# Patient Record
Sex: MALE | Race: WHITE | NOT HISPANIC OR LATINO | URBAN - METROPOLITAN AREA
[De-identification: names, ages, dates, MRNs, and addresses within clinical notes are randomized per-mention and may not be internally consistent; named-entity substitution may affect disease eponyms.]

---

## 2018-01-23 ENCOUNTER — EMERGENCY (EMERGENCY)
Facility: HOSPITAL | Age: 23
LOS: 1 days | Discharge: HOME | End: 2018-01-23
Admitting: EMERGENCY MEDICINE

## 2018-01-23 ENCOUNTER — INPATIENT (INPATIENT)
Facility: HOSPITAL | Age: 23
LOS: 0 days | Discharge: HOME | End: 2018-01-24
Attending: PSYCHIATRY & NEUROLOGY | Admitting: PSYCHIATRY & NEUROLOGY
Payer: COMMERCIAL

## 2018-01-23 VITALS
TEMPERATURE: 98 F | RESPIRATION RATE: 18 BRPM | HEART RATE: 116 BPM | DIASTOLIC BLOOD PRESSURE: 98 MMHG | SYSTOLIC BLOOD PRESSURE: 154 MMHG | OXYGEN SATURATION: 100 %

## 2018-01-23 LAB
AMPHET UR-MCNC: NEGATIVE — SIGNIFICANT CHANGE UP
BARBITURATES UR SCN-MCNC: NEGATIVE — SIGNIFICANT CHANGE UP
BASOPHILS # BLD AUTO: 0.01 K/UL — SIGNIFICANT CHANGE UP (ref 0–0.2)
BASOPHILS NFR BLD AUTO: 0.1 % — SIGNIFICANT CHANGE UP (ref 0–2)
BENZODIAZ UR-MCNC: NEGATIVE — SIGNIFICANT CHANGE UP
CANNABINOIDS UR-MCNC: NEGATIVE — SIGNIFICANT CHANGE UP
COCAINE METAB.OTHER UR-MCNC: NEGATIVE — SIGNIFICANT CHANGE UP
EOSINOPHIL # BLD AUTO: 0.07 K/UL — SIGNIFICANT CHANGE UP (ref 0–0.5)
EOSINOPHIL NFR BLD AUTO: 1 % — SIGNIFICANT CHANGE UP (ref 0–6)
HCT VFR BLD CALC: 45.6 % — SIGNIFICANT CHANGE UP (ref 39–50)
HGB BLD-MCNC: 14.7 G/DL — SIGNIFICANT CHANGE UP (ref 13–17)
IMM GRANULOCYTES # BLD AUTO: 0.01 # — SIGNIFICANT CHANGE UP
IMM GRANULOCYTES NFR BLD AUTO: 0.1 % — SIGNIFICANT CHANGE UP (ref 0–1.5)
LYMPHOCYTES # BLD AUTO: 2.07 K/UL — SIGNIFICANT CHANGE UP (ref 1–3.3)
LYMPHOCYTES # BLD AUTO: 29 % — SIGNIFICANT CHANGE UP (ref 13–44)
MCHC RBC-ENTMCNC: 27.4 PG — SIGNIFICANT CHANGE UP (ref 27–34)
MCHC RBC-ENTMCNC: 32.2 % — SIGNIFICANT CHANGE UP (ref 32–36)
MCV RBC AUTO: 84.9 FL — SIGNIFICANT CHANGE UP (ref 80–100)
METHADONE UR-MCNC: NEGATIVE — SIGNIFICANT CHANGE UP
MONOCYTES # BLD AUTO: 0.43 K/UL — SIGNIFICANT CHANGE UP (ref 0–0.9)
MONOCYTES NFR BLD AUTO: 6 % — SIGNIFICANT CHANGE UP (ref 2–14)
NEUTROPHILS # BLD AUTO: 4.54 K/UL — SIGNIFICANT CHANGE UP (ref 1.8–7.4)
NEUTROPHILS NFR BLD AUTO: 63.8 % — SIGNIFICANT CHANGE UP (ref 43–77)
NRBC # FLD: 0 — SIGNIFICANT CHANGE UP
OPIATES UR-MCNC: NEGATIVE — SIGNIFICANT CHANGE UP
OXYCODONE UR-MCNC: NEGATIVE — SIGNIFICANT CHANGE UP
PCP UR-MCNC: NEGATIVE — SIGNIFICANT CHANGE UP
PLATELET # BLD AUTO: 179 K/UL — SIGNIFICANT CHANGE UP (ref 150–400)
PMV BLD: 11.3 FL — SIGNIFICANT CHANGE UP (ref 7–13)
RBC # BLD: 5.37 M/UL — SIGNIFICANT CHANGE UP (ref 4.2–5.8)
RBC # FLD: 12.2 % — SIGNIFICANT CHANGE UP (ref 10.3–14.5)
WBC # BLD: 7.13 K/UL — SIGNIFICANT CHANGE UP (ref 3.8–10.5)
WBC # FLD AUTO: 7.13 K/UL — SIGNIFICANT CHANGE UP (ref 3.8–10.5)

## 2018-01-23 PROCEDURE — 70450 CT HEAD/BRAIN W/O DYE: CPT | Mod: 26

## 2018-01-23 PROCEDURE — 90792 PSYCH DIAG EVAL W/MED SRVCS: CPT

## 2018-01-23 NOTE — ED BEHAVIORAL HEALTH ASSESSMENT NOTE - CASE SUMMARY
22 year old male, domiciled, student at [employer], non-caregiver, with no formal PPH, no prior hospitalizations, no current outpatient treatment, no hx of self harm behaviors, no prior suicide attempts, no hx of manic or psychotic s/s, no hx of violence or arrests, no known trauma, hx of alcohol and marijuana use but none recently, with no relevant past medical history, brought in by EMS, from home but referred by school, presenting with bizarre behavior.    Pt with s/s of psychosis including Jain preoccupation and delusions that he knows the will of Gd (which encourages him to do bizarre but not dangerous things). Discussed benefits of hospitalization with patient and his parents and both highly object to admission. Pt's psychosis does not place him as being an acute risk of harm to himself or others - he is not suicidal, aggressive or homicidal; his delusions are positive in nature; he is not receiving commands to hurt himself or others; he is sleeping/eating and otherwise caring for his basic needs. Pt does not meet criteria for involuntary admission as a result of this. Pt and parents provided with extensive psychoeducation and safety planning. Pt will remain under the supervision of his mother while they pursue outpatient treatment at this time. Provided pt and parents with information for [facility] crisis clinic (should they not be able to get an appointment at [facility] as mom suspects she will) as well as the [facility] ETP track. Everyone involved felt they had adequate outpatient resources and were comfortable with patient returning home at this time. Family and patient advised to return to ED or call 911 for any worsening symptoms or safety concerns.  Pt display mild psychotic sx that where not currently dangerous to self or others. He was offered 9.13 admission which he refused and under the language of 9.39 and 9.27 he won't meet criteria for admission  under 9.39 and 9.27 admission. Pt parent reports that they felt ok with him coming home with them and would take responsibility for him. Pt mother states that she is a psych nurse and she will make sure the pt get psych f/u tomorrow at [employer] where she works.

## 2018-01-23 NOTE — ED BEHAVIORAL HEALTH ASSESSMENT NOTE - DIFFERENTIAL
r/o Schizophreniform vs Schizophrenia vs Schizoaffective d/o vs Bipolar d/o with psychosis. r/o Brief psychotic disorder vs Schizophreniform vs Schizophrenia vs Schizoaffective d/o vs Bipolar d/o with psychosis.

## 2018-01-23 NOTE — ED BEHAVIORAL HEALTH ASSESSMENT NOTE - RISK ASSESSMENT
Risk factors: Single, male gender, first break psychosis, hx of substance use, academic decline, absence of outpatient follow-up, limited insight into symptoms, difficulty expressing thoughts. Risk factors: Single, male gender, first break psychosis, hx of substance use, academic decline, absence of outpatient follow-up, limited insight into symptoms, difficulty expressing thoughts.    Protective factors: Young, healthy, denies any active suicidal ideation/intent/plan, no hx of prior attempts, no self-harm behaviors, no hospitalizations, no family hx, has responsibility to family and others, identifies reasons for living, future oriented, supportive social network or family, high spirituality, engaged in school, no active substance use, no access to firearms, no legal issues, no hx of abuse and adequate outpatient follow up with motivation to participate in care.     Based on risk assessment evaluation, Pt does not appear to be at imminent risk of harm to self or others at this time.

## 2018-01-23 NOTE — ED ADULT NURSE NOTE - CHIEF COMPLAINT QUOTE
Pt is student at [employer]. School called EMS because patient was acting strange and irrational. Called for psych eval. No previous psych hx. Denies SI, HI, drug or alcohol use. Calm and cooperative in triage.

## 2018-01-23 NOTE — ED BEHAVIORAL HEALTH ASSESSMENT NOTE - DESCRIPTION (FIRST USE, LAST USE, QUANTITY, FREQUENCY, DURATION)
Hx of social use, last about 1 year ago. Hx of social use, last about 1 year ago Hx of taking a few illicit Xanax, last over 1-2 years ago

## 2018-01-23 NOTE — ED BEHAVIORAL HEALTH ASSESSMENT NOTE - SUMMARY
Patient is a 22 year old male, domiciled, student at [employer], non-caregiver, with no formal PPH, no prior hospitalizations, no current outpatient treatment, no hx of self harm behaviors, no prior suicide attempts, no hx of manic or psychotic s/s, no hx of violence or arrests, no known trauma, hx of alcohol and marijuana use but none recently, with no relevant past medical history, brought in by EMS, from home but referred by school, presenting with bizarre behavior.    Pt acutely psychotic, religiously preoccupied, speaking largely in abstract/philosophical terms, acting bizarre at school, believing he knows the will of Gd which compelled him to act irrationally. Pt is not functioning at his baseline and requires hospitalization for safety and stabilization. Patient is a 22 year old male, domiciled, student at [employer], non-caregiver, with no formal PPH, no prior hospitalizations, no current outpatient treatment, no hx of self harm behaviors, no prior suicide attempts, no hx of manic or psychotic s/s, no hx of violence or arrests, no known trauma, hx of alcohol and marijuana use but none recently, with no relevant past medical history, brought in by EMS, from home but referred by school, presenting with bizarre behavior.    Pt with s/s of psychosis including Zoroastrian preoccupation and delusions that he knows the will of Gd (which encourages him to do bizarre but not dangerous things). Discussed benefits of hospitalization with patient and his parents and both highly object to admission. Pt's psychosis does not place him as being an acute risk of harm to himself or others - he is not suicidal, aggressive or homicidal; his delusions are positive in nature; he is not receiving commands to hurt himself or others; he is sleeping/eating and otherwise caring for his basic needs. Pt does not meet criteria for involuntary admission as a result of this. Pt and parents provided with extensive psychoeducation and safety planning. Pt will remain under the supervision of his mother while they pursue outpatient treatment at this time. Provided pt and parents with information for [facility] crisis clinic (should they not be able to get an appointment at [facility] as mom suspects she will) as well as the [facility] ETP track. Everyone involved felt they had adequate outpatient resources and were comfortable with patient returning home at this time. Family and patient advised to return to ED or call 911 for any worsening symptoms or safety concerns.

## 2018-01-23 NOTE — ED BEHAVIORAL HEALTH ASSESSMENT NOTE - SUICIDE PROTECTIVE FACTORS
Identifies reasons for living/Supportive social network or family/Future oriented/High spirituality/Engaged in work or school

## 2018-01-23 NOTE — ED PROVIDER NOTE - MEDICAL DECISION MAKING DETAILS
This is a 22 year old male No Pmhx BIBA from [place] for psych eval r/t bizarre behavior. Per EMS patient is irrational , aggressive, acting strange and hyperreligious. 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 Patient is smiling  states "I believe in God" Medical evaluation performed. There is no clinical evidence of intoxication or any acute medical problem requiring immediate intervention. Final disposition will be determined by psychiatrist.

## 2018-01-23 NOTE — ED BEHAVIORAL HEALTH ASSESSMENT NOTE - HPI (INCLUDE ILLNESS QUALITY, SEVERITY, DURATION, TIMING, CONTEXT, MODIFYING FACTORS, ASSOCIATED SIGNS AND SYMPTOMS)
Patient is a 22 year old male, domiciled, student at [employer], non-caregiver, with no formal PPH, no prior hospitalizations, no current outpatient treatment, no hx of self harm behaviors, no prior suicide attempts, no hx of manic or psychotic s/s, no hx of violence or arrests, no known trauma, hx of alcohol and marijuana use but none recently, with no relevant past medical history, brought in by EMS, from home but referred by school, presenting with bizarre behavior.    Patient has not had any past psychiatric visits, hospitalizations, medication trials or visits with a therapist or a counselor. No hx of suicidality, suicidal attempts or self- injurious behavior in the past. No hx of preoccupations with violence or arrests.     Pt highly philosophical and abstract on interview, when asked for reason of visit, pt asked writer "what do you believe" and then laughed to himself stating "Oh was I?". When asked to clarify pt states that "communicating is the hardest thing and you need omar to communicate with Pio". Pt states he has been communicating with Gd and Pio for the past 1-2 years after he was going through a hard time and reached out to Gd one night. He states "he came to me in a unique and marvelous way". Pt denies hearing the actual voice of Gd or Pio but states he knows in his heart what Gd's will is because he has omar. When really encouraged to state the circumstances leading up to his ER visit, pt states "it was the glory of Gd". He states that Gd taught him how to use this glory and make other people feel good. He states he was in class today and "we made some good glory in a huge classroom". Pt then laughing inappropriately, stating "the professor was worried but were business partners". He states at the end of class he knew it was Gd's will to remain in the classroom until everyone left. Pt states he did that and was in the classroom alone, feeling "amazing" because he did what he was supposed to do. Pt states he was walking around and was "putting my jane on the chalk board" in the form of a flag. Pt states the TAs walked back into the classroom and they engaged in a conversation about Gd. Pt was asked to leave the classroom and when he did was met by his teacher. The teacher asked pt his name, which he believes was Gd actually asking for the pt's name, "so all this would happen".    On ROS, pt denies any depressive symptoms, states his mood is "amazing", denies any suicidal ideations, intent or plans. Pt denies racing thoughts, increased energy, decreased need for sleep, reckless behaviors like increased spending/sex/travel. Pt states his thoughts are "mellow". Pt denies distinct AH but states "if I listen hard enough I can hear what Gd wants". He denies specific IOR from TV or radio, but states he does get messages from Gd. Denies aggressive or homicidal ideations, intent or plans. Denies trauma/abuse. Pt reports a hx of alcohol and marijuana use during his Freshman year at [employer] but states he has not used either of those in the past year. Pt also reports taking a "few Xanax" (illicit) a few years ago but denies anything recently. No other substance use/abuse.     Collateral from parents - see  note. The parents do not feel that pt has a psychiatric condition at this time. Patient is a 22 year old male, domiciled, student at [employer], non-caregiver, with no formal PPH, no prior hospitalizations, no current outpatient treatment, no hx of self harm behaviors, no prior suicide attempts, no hx of manic or psychotic s/s, no hx of violence or arrests, no known trauma, hx of alcohol and marijuana use but none recently, with no relevant past medical history, brought in by EMS, from home but referred by school, presenting with bizarre behavior.    Patient has not had any past psychiatric visits, hospitalizations, medication trials or visits with a therapist or a counselor. No hx of suicidality, suicidal attempts or self- injurious behavior in the past. No hx of preoccupations with violence or arrests.     Pt highly philosophical and abstract on interview, when asked for reason of visit, pt asked writer "what do you believe?" and then laughed to himself stating "Oh was I?". When asked to clarify pt states that "communicating is the hardest thing and you need omar to communicate with Pio". Pt states he has been communicating with Gd and Pio for the past 1-2 years after he was going through a hard time and reached out to Gd one night. He states "he came to me in a unique and marvelous way". Pt denies hearing the actual voice of Gd or Pio but states he knows in his heart what Gd's will is because he has omar. When really encouraged to state the circumstances leading up to his ER visit, pt states "it was the glory of Gd". He states that Gd taught him how to use this glory and make other people feel good. He states he was in class today and "we made some good glory in a huge classroom". Pt then laughing inappropriately, stating "the professor was worried but we are business partners". He states at the end of class he knew it was Gd's will to remain in the classroom until everyone left. Pt states he did that and was in the classroom alone, feeling "amazing" because he did what he was supposed to do. Pt states he was walking around and was "putting my jane on the chalk board" in the form of a cross. Pt states the TAs walked back into the classroom and they engaged in a conversation about Gd. Pt was asked to leave the classroom and when he did was met by his teacher. The teacher asked pt his name, which he believes was Gd actually asking for the pt's name, "so all this would happen".    On ROS, pt denies any depressive symptoms, states his mood is "amazing", denies any suicidal ideations, intent or plans. Pt denies racing thoughts, increased energy, decreased need for sleep, reckless behaviors like increased spending/sex/travel. Pt states his thoughts are "mellow". Pt denies distinct AH but states "if I listen hard enough I can hear what Gd wants". He denies specific IOR from TV or radio, but states he does get messages from Gd. Denies aggressive or homicidal ideations, intent or plans. Denies trauma/abuse. Pt reports a hx of alcohol and marijuana use during his Freshman year at [employer] but states he has not used either of those in the past year. Pt also reports taking a "few Xanax" (illicit) a few years ago but denies anything recently. No other substance use/abuse.     Collateral from parents - see  note. After evaluation, writer met again with pt's parents. They feel strongly that pt should not be hospitalized at this time, however state they will have him see a private outpatient psychiatrist tomorrow. Pt's mother is a psychiatric nurse and has connections at [facility]. She states she will be able to get pt in to see a psychiatrist tomorrow without issue. Mom also states that she will be staying with the patient 24/7 until it is determined that he is safe to be on his own again. Initially, both parents seemed resistant to the idea that pt had a psychiatric illness, however with psychoeducation they now appear to have a better understanding of the circumstances and are appropriately concerned/willing to seek help for the patient. Mom and dad also state they will bring patient back to the ER should symptoms worsen or safety concerns arise.

## 2018-01-23 NOTE — ED BEHAVIORAL HEALTH NOTE - BEHAVIORAL HEALTH NOTE
This worker has met with the patient's mother Elizabeth Joyce [phone] and father Saúl Joyce [phone] for collateral. 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 The patient he does not have history of mental health treatment. He is not known to have hallucinations or delusions. He does not have history of violence. The patient is noted to have always been Denominational, but is described as having become more devout over the years since turning 16 years old. The patient is known to frequently pray, read the bible and to cite his personal relationship to god.    He is not noted to have had any unusual behavior and just returned to class today after winter break. He spent his break with his family and is said to have enjoyed a trip to [place]. Today was the patient's 1st day of class and he has just began a "heavy semester" with 16 credits consisting of a long class day on both Tuesday and Thursday.  The patient returned home from class this evening around 745 and appeared "fine." The family was unaware of any issues or why the following events occurred; Shortly after patient's arrival home [place] police called the family home to speak with the patient. He reported being "ok" to [employer] staff. A few minutes after the call police arrived at the family home and the patient began to speak with them about being "good guys" and told them of his relationship with god and that "Pio speaks to his heart" so they should go and live the glory of god. Because of this behavior the he was brought to [facility] for a  mental health evaluation.    Patient's family is unaware of the events which caused his [facility] campus to call 911. They do not feel that the patient has a behavioral problem and request discharge. They report that they can arrange an appointment with a family/private psychologist.

## 2018-01-23 NOTE — ED PROVIDER NOTE - PROGRESS NOTE DETAILS
Addison MENDOZA: I received sign out on this patient.  He has been evaluated by psychiatry, cleared for discharge.

## 2018-01-23 NOTE — ED BEHAVIORAL HEALTH ASSESSMENT NOTE - OTHER PAST PSYCHIATRIC HISTORY (INCLUDE DETAILS REGARDING ONSET, COURSE OF ILLNESS, INPATIENT/OUTPATIENT TREATMENT)
Patient has not had any past psychiatric visits, hospitalizations, medication trials or visits with a therapist or a counselor. No hx of suicidality, suicidal attempts or self- injurious behavior in the past. No hx of preoccupations with violence or arrests. Denies any hx of mood swings including manic or hypomanic symptoms, AVH, delusions, paranoid thoughts, ideas of reference, obsessions or compulsions, panic attacks or flashbacks/ nightmares.

## 2018-01-23 NOTE — ED PROVIDER NOTE - OBJECTIVE STATEMENT
This is a 22 year old male No Pmhx BIBA from [place] for psych eval r/t bizarre behavior. Per EMS patient is irrational , aggressive, acting strange and hyperreligious. 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 Patient is smiling  states "I believe in God"

## 2018-01-23 NOTE — ED ADULT NURSE NOTE - OBJECTIVE STATEMENT
pt received in  c/o psych eval, pt presented in a hyperreligious mood, pt attributed a every statement to God, denies SI&HI, when asked about AH, pt stated "I hear the voice of God and that's an honor". pt denies ETOH and substance use. psych eval ongoing

## 2018-01-23 NOTE — ED BEHAVIORAL HEALTH ASSESSMENT NOTE - DESCRIPTION
Patient was calm and cooperative in the ED and did not exhibit any aggression. Pt did not require any prn medications or any physical restraints. Denies See HPI Patient was calm and cooperative in the ED and did not exhibit any aggression. Pt did not require any prn medications or any physical restraints.    Vital Signs Last 24 Hrs  T(C): 36.6 (24 Jan 2018 00:50), Max: 36.8 (23 Jan 2018 22:34)  T(F): 97.8 (24 Jan 2018 00:50), Max: 98.3 (23 Jan 2018 22:34)  HR: 74 (24 Jan 2018 00:50) (74 - 116)  BP: 128/71 (24 Jan 2018 00:50) (128/71 - 154/98)  BP(mean): --  RR: 16 (24 Jan 2018 00:50) (16 - 18)  SpO2: 99% (24 Jan 2018 00:50) (99% - 100%)

## 2018-01-23 NOTE — ED ADULT TRIAGE NOTE - CHIEF COMPLAINT QUOTE
Pt is student at [employer]. School called EMS because patient was acting strange and irrational. Called for psych eval. No previous psych hx. Denies SI, HI, drug or alcohol use. Calm and cooperative in triage.

## 2018-01-24 VITALS
SYSTOLIC BLOOD PRESSURE: 128 MMHG | OXYGEN SATURATION: 99 % | HEART RATE: 74 BPM | TEMPERATURE: 98 F | RESPIRATION RATE: 16 BRPM | DIASTOLIC BLOOD PRESSURE: 71 MMHG

## 2018-01-24 DIAGNOSIS — F29 UNSPECIFIED PSYCHOSIS NOT DUE TO A SUBSTANCE OR KNOWN PHYSIOLOGICAL CONDITION: ICD-10-CM

## 2018-01-24 PROBLEM — Z00.00 ENCOUNTER FOR PREVENTIVE HEALTH EXAMINATION: Status: ACTIVE | Noted: 2018-01-24

## 2018-01-24 LAB
ALBUMIN SERPL ELPH-MCNC: 5.3 G/DL — HIGH (ref 3.3–5)
ALP SERPL-CCNC: 61 U/L — SIGNIFICANT CHANGE UP (ref 40–120)
ALT FLD-CCNC: 20 U/L — SIGNIFICANT CHANGE UP (ref 4–41)
APAP SERPL-MCNC: < 15 UG/ML — LOW (ref 15–25)
APPEARANCE UR: CLEAR — SIGNIFICANT CHANGE UP
AST SERPL-CCNC: 24 U/L — SIGNIFICANT CHANGE UP (ref 4–40)
BARBITURATES MEASUREMENT: NEGATIVE — SIGNIFICANT CHANGE UP
BENZODIAZ SERPL-MCNC: NEGATIVE — SIGNIFICANT CHANGE UP
BILIRUB SERPL-MCNC: 0.4 MG/DL — SIGNIFICANT CHANGE UP (ref 0.2–1.2)
BILIRUB UR-MCNC: NEGATIVE — SIGNIFICANT CHANGE UP
BLOOD UR QL VISUAL: NEGATIVE — SIGNIFICANT CHANGE UP
BUN SERPL-MCNC: 14 MG/DL — SIGNIFICANT CHANGE UP (ref 7–23)
CALCIUM SERPL-MCNC: 9.8 MG/DL — SIGNIFICANT CHANGE UP (ref 8.4–10.5)
CHLORIDE SERPL-SCNC: 99 MMOL/L — SIGNIFICANT CHANGE UP (ref 98–107)
CO2 SERPL-SCNC: 28 MMOL/L — SIGNIFICANT CHANGE UP (ref 22–31)
COLOR SPEC: SIGNIFICANT CHANGE UP
CREAT SERPL-MCNC: 0.97 MG/DL — SIGNIFICANT CHANGE UP (ref 0.5–1.3)
ETHANOL BLD-MCNC: < 10 MG/DL — SIGNIFICANT CHANGE UP
GLUCOSE SERPL-MCNC: 128 MG/DL — HIGH (ref 70–99)
GLUCOSE UR-MCNC: NEGATIVE — SIGNIFICANT CHANGE UP
KETONES UR-MCNC: NEGATIVE — SIGNIFICANT CHANGE UP
LEUKOCYTE ESTERASE UR-ACNC: NEGATIVE — SIGNIFICANT CHANGE UP
NITRITE UR-MCNC: NEGATIVE — SIGNIFICANT CHANGE UP
PH UR: 6 — SIGNIFICANT CHANGE UP (ref 4.6–8)
POTASSIUM SERPL-MCNC: 5.5 MMOL/L — HIGH (ref 3.5–5.3)
POTASSIUM SERPL-SCNC: 5.5 MMOL/L — HIGH (ref 3.5–5.3)
PROT SERPL-MCNC: 8 G/DL — SIGNIFICANT CHANGE UP (ref 6–8.3)
PROT UR-MCNC: NEGATIVE MG/DL — SIGNIFICANT CHANGE UP
RBC CASTS # UR COMP ASSIST: SIGNIFICANT CHANGE UP (ref 0–?)
SALICYLATES SERPL-MCNC: < 5 MG/DL — LOW (ref 15–30)
SODIUM SERPL-SCNC: 140 MMOL/L — SIGNIFICANT CHANGE UP (ref 135–145)
SP GR SPEC: 1.01 — SIGNIFICANT CHANGE UP (ref 1–1.04)
TSH SERPL-MCNC: < 0.1 UIU/ML — LOW (ref 0.27–4.2)
UROBILINOGEN FLD QL: NORMAL MG/DL — SIGNIFICANT CHANGE UP
WBC UR QL: SIGNIFICANT CHANGE UP (ref 0–?)

## 2018-01-24 NOTE — ED ADULT NURSE REASSESSMENT NOTE - NS ED NURSE REASSESS COMMENT FT1
pt. resting comfortably in bed, calm and cooperative at the present time.  Vital signs stable. Pt. advised he is being admitted to [address].  Report given to MARYLOU Carrasco.  Pt. awaiting transport via EMS.

## 2018-10-03 ENCOUNTER — OUTPATIENT (OUTPATIENT)
Dept: OUTPATIENT SERVICES | Facility: HOSPITAL | Age: 23
LOS: 1 days | Discharge: HOME | End: 2018-10-03

## 2018-10-04 DIAGNOSIS — F29 UNSPECIFIED PSYCHOSIS NOT DUE TO A SUBSTANCE OR KNOWN PHYSIOLOGICAL CONDITION: ICD-10-CM

## 2019-12-22 ENCOUNTER — EMERGENCY (EMERGENCY)
Facility: HOSPITAL | Age: 24
LOS: 1 days | Discharge: HOME | End: 2019-12-22
Admitting: STUDENT IN AN ORGANIZED HEALTH CARE EDUCATION/TRAINING PROGRAM
Payer: COMMERCIAL

## 2019-12-22 VITALS
HEART RATE: 110 BPM | OXYGEN SATURATION: 99 % | DIASTOLIC BLOOD PRESSURE: 77 MMHG | RESPIRATION RATE: 18 BRPM | SYSTOLIC BLOOD PRESSURE: 114 MMHG

## 2019-12-22 DIAGNOSIS — R69 ILLNESS, UNSPECIFIED: ICD-10-CM

## 2019-12-22 LAB
ALBUMIN SERPL ELPH-MCNC: 5.4 G/DL — HIGH (ref 3.3–5)
ALP SERPL-CCNC: 59 U/L — SIGNIFICANT CHANGE UP (ref 40–120)
ALT FLD-CCNC: 20 U/L — SIGNIFICANT CHANGE UP (ref 4–41)
ANION GAP SERPL CALC-SCNC: 15 MMO/L — HIGH (ref 7–14)
APAP SERPL-MCNC: < 15 UG/ML — LOW (ref 15–25)
AST SERPL-CCNC: 31 U/L — SIGNIFICANT CHANGE UP (ref 4–40)
BASOPHILS # BLD AUTO: 0.03 K/UL — SIGNIFICANT CHANGE UP (ref 0–0.2)
BASOPHILS NFR BLD AUTO: 0.4 % — SIGNIFICANT CHANGE UP (ref 0–2)
BILIRUB SERPL-MCNC: 0.9 MG/DL — SIGNIFICANT CHANGE UP (ref 0.2–1.2)
BUN SERPL-MCNC: 20 MG/DL — SIGNIFICANT CHANGE UP (ref 7–23)
CALCIUM SERPL-MCNC: 10.2 MG/DL — SIGNIFICANT CHANGE UP (ref 8.4–10.5)
CHLORIDE SERPL-SCNC: 102 MMOL/L — SIGNIFICANT CHANGE UP (ref 98–107)
CO2 SERPL-SCNC: 23 MMOL/L — SIGNIFICANT CHANGE UP (ref 22–31)
CREAT SERPL-MCNC: 0.99 MG/DL — SIGNIFICANT CHANGE UP (ref 0.5–1.3)
EOSINOPHIL # BLD AUTO: 0.06 K/UL — SIGNIFICANT CHANGE UP (ref 0–0.5)
EOSINOPHIL NFR BLD AUTO: 0.8 % — SIGNIFICANT CHANGE UP (ref 0–6)
ETHANOL BLD-MCNC: < 10 MG/DL — SIGNIFICANT CHANGE UP
GLUCOSE SERPL-MCNC: 116 MG/DL — HIGH (ref 70–99)
HCT VFR BLD CALC: 44.9 % — SIGNIFICANT CHANGE UP (ref 39–50)
HGB BLD-MCNC: 14.7 G/DL — SIGNIFICANT CHANGE UP (ref 13–17)
IMM GRANULOCYTES NFR BLD AUTO: 0.3 % — SIGNIFICANT CHANGE UP (ref 0–1.5)
LYMPHOCYTES # BLD AUTO: 1.1 K/UL — SIGNIFICANT CHANGE UP (ref 1–3.3)
LYMPHOCYTES # BLD AUTO: 14.5 % — SIGNIFICANT CHANGE UP (ref 13–44)
MCHC RBC-ENTMCNC: 27.6 PG — SIGNIFICANT CHANGE UP (ref 27–34)
MCHC RBC-ENTMCNC: 32.7 % — SIGNIFICANT CHANGE UP (ref 32–36)
MCV RBC AUTO: 84.2 FL — SIGNIFICANT CHANGE UP (ref 80–100)
MONOCYTES # BLD AUTO: 0.43 K/UL — SIGNIFICANT CHANGE UP (ref 0–0.9)
MONOCYTES NFR BLD AUTO: 5.7 % — SIGNIFICANT CHANGE UP (ref 2–14)
NEUTROPHILS # BLD AUTO: 5.94 K/UL — SIGNIFICANT CHANGE UP (ref 1.8–7.4)
NEUTROPHILS NFR BLD AUTO: 78.3 % — HIGH (ref 43–77)
NRBC # FLD: 0 K/UL — SIGNIFICANT CHANGE UP (ref 0–0)
PLATELET # BLD AUTO: 189 K/UL — SIGNIFICANT CHANGE UP (ref 150–400)
PMV BLD: 10.6 FL — SIGNIFICANT CHANGE UP (ref 7–13)
POTASSIUM SERPL-MCNC: 4.5 MMOL/L — SIGNIFICANT CHANGE UP (ref 3.5–5.3)
POTASSIUM SERPL-SCNC: 4.5 MMOL/L — SIGNIFICANT CHANGE UP (ref 3.5–5.3)
PROT SERPL-MCNC: 8.4 G/DL — HIGH (ref 6–8.3)
RBC # BLD: 5.33 M/UL — SIGNIFICANT CHANGE UP (ref 4.2–5.8)
RBC # FLD: 12.9 % — SIGNIFICANT CHANGE UP (ref 10.3–14.5)
SALICYLATES SERPL-MCNC: < 5 MG/DL — LOW (ref 15–30)
SODIUM SERPL-SCNC: 140 MMOL/L — SIGNIFICANT CHANGE UP (ref 135–145)
TSH SERPL-MCNC: 1.87 UIU/ML — SIGNIFICANT CHANGE UP (ref 0.27–4.2)
WBC # BLD: 7.58 K/UL — SIGNIFICANT CHANGE UP (ref 3.8–10.5)
WBC # FLD AUTO: 7.58 K/UL — SIGNIFICANT CHANGE UP (ref 3.8–10.5)

## 2019-12-22 PROCEDURE — 99285 EMERGENCY DEPT VISIT HI MDM: CPT | Mod: 25

## 2019-12-22 PROCEDURE — 93010 ELECTROCARDIOGRAM REPORT: CPT

## 2019-12-22 PROCEDURE — 90792 PSYCH DIAG EVAL W/MED SRVCS: CPT | Mod: GC

## 2019-12-22 RX ORDER — HALOPERIDOL DECANOATE 100 MG/ML
5 INJECTION INTRAMUSCULAR ONCE
Refills: 0 | Status: COMPLETED | OUTPATIENT
Start: 2019-12-22 | End: 2019-12-22

## 2019-12-22 RX ADMIN — HALOPERIDOL DECANOATE 5 MILLIGRAM(S): 100 INJECTION INTRAMUSCULAR at 16:30

## 2019-12-22 RX ADMIN — Medication 2 MILLIGRAM(S): at 16:31

## 2019-12-22 NOTE — ED PROVIDER NOTE - PATIENT PORTAL LINK FT
You can access the FollowMyHealth Patient Portal offered by [facility] by registering at the following website: [email or web address]. By joining [facility]’s FollowMyHealth portal, you will also be able to view your health information using other applications (apps) compatible with our system.

## 2019-12-22 NOTE — ED BEHAVIORAL HEALTH ASSESSMENT NOTE - HPI (INCLUDE ILLNESS QUALITY, SEVERITY, DURATION, TIMING, CONTEXT, MODIFYING FACTORS, ASSOCIATED SIGNS AND SYMPTOMS)
25 y/o male, michelle at [employer], history of 1 past psychiatric hospitalization (3/2018) and at least 3 past psych ER visits ([facility], [place], [facility]), has never been in outpatient psychiatric treatment, no pertinent PMH, history of previously regular but more recently intermittent marijuana use, presented to [facility] ED BIB EMS activated by parents in the context of verbal aggression at home in the broader context of reportedly recent marijuana use.    On presentation to ED Jacoby was agitated and required IM medication for safety of himself and staff.    On assessment once awake and able to meaningfully engage in conversation Jacoby reports....    See  note for collateral information. Briefly patient's mother and father report that Jacoby has psychotic and possibly manic symptoms including hyperreligiosity, irritability, decreased need for sleep, and ideas of reference that are greatly exacerbated by his intermittent marijuana use. He is not currently in outpatient care and parents would like him to enter treatment. 25 y/o male, michelle at [employer], history of 1 past psychiatric hospitalization (3/2018) and at least 3 past psych ER visits ([facility], [place], [facility]), has never been in outpatient psychiatric treatment, no pertinent PMH, history of previously regular but more recently intermittent marijuana use, presented to [facility] ED BIB EMS activated by parents in the context of verbal aggression at home in the broader context of reportedly recent marijuana use.    On presentation to ED Jacoby was agitated and required IM medication for safety of himself and staff.    On assessment once awake and able to meaningfully engage in conversation Jacoby reports he is here because of a "misbelief, they don't believe me when I say things," referring to his parents. The "things" he refers to are "miracles and dreams and prophecies." He refuses to reveal details of the prophesies after he asks the interviewer "are you a believer?" Patient states prophecies are gifts of the spirit, sometimes you have to ask the holy spirit to open your eyes. Patient states earlier today he was yelling at home because his parents were "telling me what to do, and instead of getting aggressive I like to yell." Patient denies having had thoughts of hurting his parents. Patient reports feeling "super accomplished," reports increased goal directed activity, racing thoughts. Patient reports ability to concentrate, no recent risky behaviors, no increased talkativeness. Patient reports recently taking finals at school, states he's been doing "good" at school this semester but is now home for winter break, plans on going back to school after the break. Patient reports smoking marijuana 1x/week, drinks alcohol 1-2 drinks/week. Patient denies AVH, denies paranoia, no SI/HI, no depressed mood, no hopelessness, reports sleeping 6-9 hours/night, has had good appetite.    See  note for collateral information. Briefly patient's mother and father report that Jacoby has psychotic and possibly manic symptoms including hyperreligiosity, irritability, decreased need for sleep, and ideas of reference that are greatly exacerbated by his intermittent marijuana use. He is not currently in outpatient care and parents would like him to enter treatment.

## 2019-12-22 NOTE — ED BEHAVIORAL HEALTH ASSESSMENT NOTE - RISK ASSESSMENT
Patient is at chronic elevated risk given his history of prior hospitalization, substance use, male, gun at home. Acute risk factors include grandiosity, Anglican preoccupation, recent agitation at home. However, protective factors include currently calm and cooperative, no prior SA, no current SI/HI, no prior legal history, no prior violence history, student at [employer], lives at home with parents. Low Acute Suicide Risk

## 2019-12-22 NOTE — ED PROVIDER NOTE - NSFOLLOWUPINSTRUCTIONS_ED_ALL_ED_FT
Depression    Depression is a mental illness that usually causes feelings of sadness, hopelessness, or helplessness. Some people with this disorder do not feel particularly sad but lose interest in doing things they used to enjoy. Major depressive disorder also can cause physical symptoms. It can interfere with work, school, relationships, and other normal everyday activities. If you were started on a medication, make sure to take exactly as prescribed and follow up with a psychiatrist.    SEEK IMMEDIATE MEDICAL CARE IF YOU HAVE ANY OF THE FOLLOWING SYMPTOMS: thoughts about hurting or killing yourself, thoughts about hurting or killing somebody else, hallucinations, or worsening depression.     Anxiety    Generalized anxiety disorder (JOSE) is a mental disorder. It is defined as anxiety that is not necessarily related to specific events or activities or is out of proportion to said events. Symptoms include restlessness, fatigue, difficulty concentrations, irritability and difficulty concentrating. It may interfere with life functions, including relationships, work, and school. If you were started on a medication, make sure to take exactly as prescribed and follow up with a psychiatrist.    SEEK IMMEDIATE MEDICAL CARE IF YOU HAVE ANY OF THE FOLLOWING SYMPTOMS: thoughts about hurting killing yourself, thoughts about hurting or killing somebody else, hallucinations, or worsening depression.

## 2019-12-22 NOTE — ED BEHAVIORAL HEALTH ASSESSMENT NOTE - SUMMARY
25 y/o male, michelle at [facility], history of 1 past psychiatric hospitalization (3/2018) and at least 3 past psych ER visits ([facility], [place], [facility]), has never been in outpatient psychiatric treatment, no pertinent PMH, history of previously regular but more recently intermittent marijuana use, presented to [facility] ED BIB EMS activated by parents in the context of verbal aggression at home in the broader context of reportedly recent marijuana use. On presentation to ED [facility] was agitated and required IM medication for safety of himself and staff. Upon reassessment, patient is religiously preoccupied w/ grandiosity, but is calm and cooperative, denying suicidal or homicidal thoughts, future oriented, no AVH, CAH. Patient does not meet criteria for involuntary inpatient hospitalization at this time as he is not an acute danger to self or others at this time. Patient offered but refusing voluntary hospitalization.
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
## 2019-12-22 NOTE — ED PROVIDER NOTE - OBJECTIVE STATEMENT
This is  a 24 yr old M, pmh psychosis with c/o erratic, bizarre behaviour. Arrived with PD in handcuffs and ems. Upon arrival , bizarre, inappropriate, and oppositional. Mother Inocente ( [phone]) stats pt was hospitalized last year for psychosis. Most likely it was drug induced. She says, when patient is under lots of stress, he smokes marijuana and starts to act bizarre. Mother states last time they put the patient on Abilify and that did not work well with him. He became hypersexual, and started to gain weight. Mother c/o of his bizarre erratic behaviour, and states his hyperreligious.   Upon arrival risk to self injury and injury to other medication offered and accepted.

## 2019-12-22 NOTE — ED PROVIDER NOTE - CLINICAL SUMMARY MEDICAL DECISION MAKING FREE TEXT BOX
This is  a 24 yr old M, [facility] psychosis with c/o erratic, bizarre behaviour. Screening basic labs, ua tox/serum r/o underlying medical causes. Psychiatric consult requested. Upon arrival risk to self injury and injury to other medication offered and accepted.

## 2019-12-22 NOTE — ED ADULT TRIAGE NOTE - CHIEF COMPLAINT QUOTE
Pt brought in by EMS and [employer] for aggressive behavior and uncooperative. As per mom pt has no psych hx.  Denies SI/HI.

## 2019-12-22 NOTE — ED BEHAVIORAL HEALTH ASSESSMENT NOTE - SUICIDE RISK FACTORS
Alcohol/Substance abuse disorders/Access to lethal methods (pills, firearm, etc.: Ask specifically about presence or absence of a firearm in the home or ease of accessing/Mood Disorder current/past

## 2019-12-22 NOTE — ED BEHAVIORAL HEALTH NOTE - BEHAVIORAL HEALTH NOTE
Writer attempted to assess Mr. Joyce but he was only able to stay awake for about 5 minutes before falling asleep again. He is currently unable to meaningfully engage in assessment in the context of having received Haldol 5mg and Ativan 2mg at 4:30pm. Writer attempted to assess Mr. Joyce but he was only able to stay awake for about 5 minutes before falling asleep again. During that limited time he was predominantly oppositional in response to writer's questions. He is currently unable to meaningfully engage in assessment in the context of having received Haldol 5mg and Ativan 2mg at 4:30pm. Writer attempted to assess Mr. Joyce but he was only able to stay awake for about 5 minutes before falling asleep again. During that limited time he was predominantly oppositional in response to writer's questions and denied SI/HI. He is currently unable to meaningfully engage in assessment in the context of having received Haldol 5mg and Ativan 2mg at 4:30pm.

## 2019-12-22 NOTE — ED BEHAVIORAL HEALTH ASSESSMENT NOTE - OTHER
Police Family marijuana use, recent psychosocial stressors (finals at school) "okay" religiously preoccupied Spoke to mother about gun - the gun is locked and not on the premises
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
## 2019-12-22 NOTE — ED BEHAVIORAL HEALTH ASSESSMENT NOTE - SAFETY PLAN DETAILS
Patient to call 911 and/or return to ED if symptoms worsen or if patient is at acute danger to self or others.

## 2019-12-22 NOTE — ED BEHAVIORAL HEALTH ASSESSMENT NOTE - CASE SUMMARY
Patient was seen and assessed  , elements of HPI reviewed  with resident Dr. Gonzalez. 23 y/o male, michelle at [facility] [employer], history of 1 past psychiatric hospitalization (3/2018) and at least 3 past psych ER visits ([facility], [place], [facility] has never been in outpatient psychiatric treatment, no pertinent PMH, history of previously regular but more recently intermittent marijuana use, presented to [facility] ED BIB EMS activated by parents in the context of verbal aggression at home in the broader context of reportedly recent marijuana use.  On reassessment this morning, he is cooperative, calm . He did not require any further IM medications after his initial presentation to ED. He does not present with any over psychotic symptoms , he is in good control over night . No acute manic sx are noted, some religiosity present . He denies any si/i/p, denies hi/i/p. Patient does not meet criteria for involuntary hospitalization and declines voluntary admission.

## 2019-12-22 NOTE — ED PROVIDER NOTE - PROGRESS NOTE DETAILS
Dina NP- pt sleeping, upon awaking will need psychiatric consult. No medical distress at this time. Rufina Aponte M.D: pt was sgined out to me by dr pham pending final psych recs. psych has evaluated and cleared pt for dc.
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
## 2019-12-22 NOTE — ED BEHAVIORAL HEALTH ASSESSMENT NOTE - OTHER PAST PSYCHIATRIC HISTORY (INCLUDE DETAILS REGARDING ONSET, COURSE OF ILLNESS, INPATIENT/OUTPATIENT TREATMENT)
2018 hospitalization at [facility] and 3 past psych ER assessments, was seen at [facility] in October 2018 but never returned for follow-up,

## 2019-12-22 NOTE — ED BEHAVIORAL HEALTH ASSESSMENT NOTE - DESCRIPTION
Patient was aggressive towards staff and not responding to redirection requiring Haldol 5mg + Ativan 2mg for safety of patient and staff    Vital Signs Last 24 Hrs  HR: 110 (22 Dec 2019 15:16) (110 - 110)  BP: 114/77 (22 Dec 2019 15:16) (114/77 - 114/77)  RR: 18 (22 Dec 2019 15:16) (18 - 18)  SpO2: 99% (22 Dec 2019 15:16) (99% - 99%)                          14.7   7.58  )-----------( 189      ( 22 Dec 2019 16:00 )             44.9       140    |  102    |  20     ----------------------------<  116    4.5     |  23     |  0.99     Ca    10.2       22 Dec 2019 16:00    TPro  8.4    /  Alb  5.4    /  TBili  0.9    /  DBili  x      /  AST  31     /  ALT  20     /  AlkPhos  59     22 Dec 2019 16:00 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 Student at [employer], studying Sociology

## 2019-12-22 NOTE — ED BEHAVIORAL HEALTH NOTE - BEHAVIORAL HEALTH NOTE
Writer attempted to assess Mr. Joyce but he is asleep and is currently unable to meaningfully engage in assessment. Writer attempted to assess Mr. Joyce but he is asleep and is currently unable to meaningfully engage in assessment in the context of having received Haldol 5mg and Ativan 2mg at 4:30pm.

## 2019-12-23 VITALS
SYSTOLIC BLOOD PRESSURE: 122 MMHG | RESPIRATION RATE: 18 BRPM | TEMPERATURE: 98 F | DIASTOLIC BLOOD PRESSURE: 74 MMHG | OXYGEN SATURATION: 100 % | HEART RATE: 80 BPM

## 2019-12-23 DIAGNOSIS — F39 UNSPECIFIED MOOD [AFFECTIVE] DISORDER: ICD-10-CM

## 2019-12-23 NOTE — ED BEHAVIORAL HEALTH NOTE - BEHAVIORAL HEALTH NOTE
Reviewed pt's chart , and his initial presentation to ED. He required IM due to agitation. Pt has not been cooperative due to sedation. Pt was seen by this writer at 12:30 on 12/22/23 and he was lethargic , but cooperated with the vitals. However refused to participate in the interview  stating  "I'm sure you have all in the records". Attempt was made explain to the pt that assessment is necessary in order to determine his disposition and his cooperation is important to make that decision , however he proceeded to put a blanket over his head "I am sleeping and you are bothering me".   Will try to assess when pt is fully awake and cooperative .

## 2019-12-23 NOTE — ED ADULT NURSE REASSESSMENT NOTE - NS ED NURSE REASSESS COMMENT FT1
pt resting comfortably in bed absent any distress or C/O pain. safety measures in place, able to make needs known with care provided PRN
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
## 2019-12-30 ENCOUNTER — INPATIENT (INPATIENT)
Facility: HOSPITAL | Age: 24
LOS: 7 days | Discharge: HOME | End: 2020-01-07
Attending: PSYCHIATRY & NEUROLOGY | Admitting: PSYCHIATRY & NEUROLOGY
Payer: COMMERCIAL

## 2019-12-30 VITALS
DIASTOLIC BLOOD PRESSURE: 66 MMHG | SYSTOLIC BLOOD PRESSURE: 120 MMHG | OXYGEN SATURATION: 100 % | RESPIRATION RATE: 16 BRPM | HEART RATE: 98 BPM | TEMPERATURE: 98 F

## 2019-12-30 DIAGNOSIS — F29 UNSPECIFIED PSYCHOSIS NOT DUE TO A SUBSTANCE OR KNOWN PHYSIOLOGICAL CONDITION: ICD-10-CM

## 2019-12-30 DIAGNOSIS — F12.90 CANNABIS USE, UNSPECIFIED, UNCOMPLICATED: ICD-10-CM

## 2019-12-30 LAB
ALBUMIN SERPL ELPH-MCNC: 5.1 G/DL — HIGH (ref 3.3–5)
ALP SERPL-CCNC: 59 U/L — SIGNIFICANT CHANGE UP (ref 40–120)
ALT FLD-CCNC: 21 U/L — SIGNIFICANT CHANGE UP (ref 4–41)
AMPHET UR-MCNC: NEGATIVE — SIGNIFICANT CHANGE UP
ANION GAP SERPL CALC-SCNC: 16 MMO/L — HIGH (ref 7–14)
APAP SERPL-MCNC: < 15 UG/ML — LOW (ref 15–25)
APPEARANCE UR: CLEAR — SIGNIFICANT CHANGE UP
AST SERPL-CCNC: 28 U/L — SIGNIFICANT CHANGE UP (ref 4–40)
BARBITURATES UR SCN-MCNC: NEGATIVE — SIGNIFICANT CHANGE UP
BASOPHILS # BLD AUTO: 0.03 K/UL — SIGNIFICANT CHANGE UP (ref 0–0.2)
BASOPHILS NFR BLD AUTO: 0.3 % — SIGNIFICANT CHANGE UP (ref 0–2)
BENZODIAZ UR-MCNC: NEGATIVE — SIGNIFICANT CHANGE UP
BILIRUB SERPL-MCNC: 0.4 MG/DL — SIGNIFICANT CHANGE UP (ref 0.2–1.2)
BILIRUB UR-MCNC: NEGATIVE — SIGNIFICANT CHANGE UP
BLOOD UR QL VISUAL: NEGATIVE — SIGNIFICANT CHANGE UP
BUN SERPL-MCNC: 16 MG/DL — SIGNIFICANT CHANGE UP (ref 7–23)
CALCIUM SERPL-MCNC: 10.5 MG/DL — SIGNIFICANT CHANGE UP (ref 8.4–10.5)
CANNABINOIDS UR-MCNC: POSITIVE — SIGNIFICANT CHANGE UP
CHLORIDE SERPL-SCNC: 102 MMOL/L — SIGNIFICANT CHANGE UP (ref 98–107)
CO2 SERPL-SCNC: 21 MMOL/L — LOW (ref 22–31)
COCAINE METAB.OTHER UR-MCNC: NEGATIVE — SIGNIFICANT CHANGE UP
COLOR SPEC: YELLOW — SIGNIFICANT CHANGE UP
CREAT SERPL-MCNC: 0.78 MG/DL — SIGNIFICANT CHANGE UP (ref 0.5–1.3)
EOSINOPHIL # BLD AUTO: 0.03 K/UL — SIGNIFICANT CHANGE UP (ref 0–0.5)
EOSINOPHIL NFR BLD AUTO: 0.3 % — SIGNIFICANT CHANGE UP (ref 0–6)
ETHANOL BLD-MCNC: < 10 MG/DL — SIGNIFICANT CHANGE UP
GLUCOSE SERPL-MCNC: 97 MG/DL — SIGNIFICANT CHANGE UP (ref 70–99)
GLUCOSE UR-MCNC: NEGATIVE — SIGNIFICANT CHANGE UP
HCT VFR BLD CALC: 46.6 % — SIGNIFICANT CHANGE UP (ref 39–50)
HGB BLD-MCNC: 15.3 G/DL — SIGNIFICANT CHANGE UP (ref 13–17)
IMM GRANULOCYTES NFR BLD AUTO: 0.2 % — SIGNIFICANT CHANGE UP (ref 0–1.5)
KETONES UR-MCNC: NEGATIVE — SIGNIFICANT CHANGE UP
LEUKOCYTE ESTERASE UR-ACNC: NEGATIVE — SIGNIFICANT CHANGE UP
LYMPHOCYTES # BLD AUTO: 1.22 K/UL — SIGNIFICANT CHANGE UP (ref 1–3.3)
LYMPHOCYTES # BLD AUTO: 13 % — SIGNIFICANT CHANGE UP (ref 13–44)
MCHC RBC-ENTMCNC: 27.8 PG — SIGNIFICANT CHANGE UP (ref 27–34)
MCHC RBC-ENTMCNC: 32.8 % — SIGNIFICANT CHANGE UP (ref 32–36)
MCV RBC AUTO: 84.6 FL — SIGNIFICANT CHANGE UP (ref 80–100)
METHADONE UR-MCNC: NEGATIVE — SIGNIFICANT CHANGE UP
MONOCYTES # BLD AUTO: 0.44 K/UL — SIGNIFICANT CHANGE UP (ref 0–0.9)
MONOCYTES NFR BLD AUTO: 4.7 % — SIGNIFICANT CHANGE UP (ref 2–14)
NEUTROPHILS # BLD AUTO: 7.65 K/UL — HIGH (ref 1.8–7.4)
NEUTROPHILS NFR BLD AUTO: 81.5 % — HIGH (ref 43–77)
NITRITE UR-MCNC: NEGATIVE — SIGNIFICANT CHANGE UP
NRBC # FLD: 0 K/UL — SIGNIFICANT CHANGE UP (ref 0–0)
OPIATES UR-MCNC: NEGATIVE — SIGNIFICANT CHANGE UP
OXYCODONE UR-MCNC: NEGATIVE — SIGNIFICANT CHANGE UP
PCP UR-MCNC: NEGATIVE — SIGNIFICANT CHANGE UP
PH UR: 6.5 — SIGNIFICANT CHANGE UP (ref 5–8)
PLATELET # BLD AUTO: 194 K/UL — SIGNIFICANT CHANGE UP (ref 150–400)
PMV BLD: 11.2 FL — SIGNIFICANT CHANGE UP (ref 7–13)
POTASSIUM SERPL-MCNC: 4.6 MMOL/L — SIGNIFICANT CHANGE UP (ref 3.5–5.3)
POTASSIUM SERPL-SCNC: 4.6 MMOL/L — SIGNIFICANT CHANGE UP (ref 3.5–5.3)
PROT SERPL-MCNC: 8.1 G/DL — SIGNIFICANT CHANGE UP (ref 6–8.3)
PROT UR-MCNC: 30 — SIGNIFICANT CHANGE UP
RBC # BLD: 5.51 M/UL — SIGNIFICANT CHANGE UP (ref 4.2–5.8)
RBC # FLD: 12.6 % — SIGNIFICANT CHANGE UP (ref 10.3–14.5)
SALICYLATES SERPL-MCNC: < 5 MG/DL — LOW (ref 15–30)
SODIUM SERPL-SCNC: 139 MMOL/L — SIGNIFICANT CHANGE UP (ref 135–145)
SP GR SPEC: 1.02 — SIGNIFICANT CHANGE UP (ref 1–1.04)
TSH SERPL-MCNC: 1.37 UIU/ML — SIGNIFICANT CHANGE UP (ref 0.27–4.2)
UROBILINOGEN FLD QL: NORMAL — SIGNIFICANT CHANGE UP
WBC # BLD: 9.39 K/UL — SIGNIFICANT CHANGE UP (ref 3.8–10.5)
WBC # FLD AUTO: 9.39 K/UL — SIGNIFICANT CHANGE UP (ref 3.8–10.5)

## 2019-12-30 PROCEDURE — 99285 EMERGENCY DEPT VISIT HI MDM: CPT | Mod: GC

## 2019-12-30 RX ORDER — ACETAMINOPHEN 500 MG
650 TABLET ORAL EVERY 6 HOURS
Refills: 0 | Status: DISCONTINUED | OUTPATIENT
Start: 2019-12-31 | End: 2020-01-07

## 2019-12-30 NOTE — ED BEHAVIORAL HEALTH ASSESSMENT NOTE - OTHER PAST PSYCHIATRIC HISTORY (INCLUDE DETAILS REGARDING ONSET, COURSE OF ILLNESS, INPATIENT/OUTPATIENT TREATMENT)
2018 hospitalization at [facility] and 3 past psych ER assessments, was seen at [facility] in October 2018 but never returned for follow-up. Diagnosed with unspecified psychosis at that time, believed to be suffering from a primary psychotic disorder. On an abilify HEALY in the past without consistent follow up.

## 2019-12-30 NOTE — ED BEHAVIORAL HEALTH ASSESSMENT NOTE - SUICIDE PROTECTIVE FACTORS
Engaged in work or school/Has future plans/Protestant beliefs/Identifies reasons for living/Supportive social network of family or friends
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
## 2019-12-30 NOTE — ED BEHAVIORAL HEALTH ASSESSMENT NOTE - DESCRIPTION (FIRST USE, LAST USE, QUANTITY, FREQUENCY, DURATION)
every few days 1-2 drinks/week. Pt denies drinking more than this, but is vague and laughs when describing his use
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
## 2019-12-30 NOTE — ED ADULT NURSE NOTE - NSIMPLEMENTINTERV_GEN_ALL_ED
Implemented All Universal Safety Interventions:  [place] to call system. Call bell, personal items and telephone within reach. Instruct patient to call for assistance. Room bathroom lighting operational. Non-slip footwear when patient is off stretcher. Physically safe environment: no spills, clutter or unnecessary equipment. Stretcher in lowest position, wheels locked, appropriate side rails in place.
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
## 2019-12-30 NOTE — ED BEHAVIORAL HEALTH ASSESSMENT NOTE - MODIFICATIONS
I have examined and evaluated the patient with Dr CATHY Cleveland and performed key elements of the History and Mental Status Examination.  I agree with his assessment and recommendations.

## 2019-12-30 NOTE — ED BEHAVIORAL HEALTH ASSESSMENT NOTE - VIOLENCE RISK FACTORS:
Affective dysregulation/Lack of insight into violence risk/need for treatment/Substance abuse/Irritability/Noncompliance with treatment

## 2019-12-30 NOTE — ED BEHAVIORAL HEALTH ASSESSMENT NOTE - SUICIDE RISK FACTORS
Alcohol/Substance abuse disorders/Access to lethal methods (pills, firearm, etc.: Ask specifically about presence or absence of a firearm in the home or ease of accessing/Psychotic disorder current/past

## 2019-12-30 NOTE — ED BEHAVIORAL HEALTH ASSESSMENT NOTE - DESCRIPTION
Pt calm and cooperative, VSS.    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 Student at [employer], studying Sociology
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
## 2019-12-30 NOTE — ED BEHAVIORAL HEALTH ASSESSMENT NOTE - CASE SUMMARY
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  The Pt was previously seen at the Crisis centre last 10/3/2018 as a referral for an intake following [facility] admission last 9/2018 for psychotic symptoms.  Pt refused continued care at the [facility] during that time.  Previously presented to the [facility] ED last 12/22/2019 due to aggressive behavior.  Pt was subsequently discharged during that encounter.  Today, presented once again due to worsening agitation.  At this time, displays disorganization in TP, in speech, is delusional (grandiose, hyperreligious), is affectively dysregulated, poor insight, impaired judgement and unpredictable impulse control.  Pt is unable to partake towards safety planning. At this time, the Pt's presentation is not likely consequential of substance induction alone.  Rather, there might be an underlying primary psychotic/mood disorder being masked by Pt's "self medicating of THC".  Collateral information was obtained from the parents who both expressed concern regarding the worsening level of agitation as well as the functional impairment (posed upon by current symptoms) the Pt has been exhibiting.  As such, the Pt is not deemed safely dischargeable back to the community as he poses a risk himself and to the community.  The Pt will need in-Pt psych admission for stablization of psychosis and mood as well as ensuring safety.      Recommendations:   1.  defer initiation of antipsychotic for now.  Pt will be referred to FEP research study.     2. PRN: Ativan 2 mg PO/IM q6Hrs for agitation/severe agitation.    3.  once medically cleared, facilitate transfer to [facility] on 9.39 status
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
## 2019-12-30 NOTE — ED PROVIDER NOTE - CLINICAL SUMMARY MEDICAL DECISION MAKING FREE TEXT BOX
24 yr old M BIBEMS after behaving aggressive after smoking marijuana. Pt appears clinically sober. Reviewed prior visits which revealed visit one week prior for similar symptoms. Review of  note revealed pt intoxicated with marijuana. No indication for inpatient psychiatric management. Will have  reach out to pt's family for collateral. Anticipate dc with PCP f/u and marijuana counseling. 24 yr old M BIBEMS after behaving aggressive after smoking marijuana. Pt appears clinically sober. Calm and cooperative here.  Reviewed prior visits which revealed visit one week prior for similar symptoms. Review of  note revealed pt intoxicated with marijuana. No indication for inpatient psychiatric management at that time. Will have  reach out to pt's family for collateral. Anticipate dc with PCP f/u and marijuana counseling.
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
## 2019-12-31 PROCEDURE — 99221 1ST HOSP IP/OBS SF/LOW 40: CPT

## 2020-01-01 PROCEDURE — 99232 SBSQ HOSP IP/OBS MODERATE 35: CPT

## 2020-01-02 PROCEDURE — 99231 SBSQ HOSP IP/OBS SF/LOW 25: CPT

## 2020-01-03 PROCEDURE — 99231 SBSQ HOSP IP/OBS SF/LOW 25: CPT

## 2020-01-04 PROCEDURE — 99231 SBSQ HOSP IP/OBS SF/LOW 25: CPT

## 2020-01-06 PROCEDURE — 99231 SBSQ HOSP IP/OBS SF/LOW 25: CPT

## 2020-01-07 VITALS
RESPIRATION RATE: 20 BRPM | TEMPERATURE: 98 F | DIASTOLIC BLOOD PRESSURE: 65 MMHG | HEART RATE: 82 BPM | SYSTOLIC BLOOD PRESSURE: 119 MMHG

## 2020-01-07 PROCEDURE — 99239 HOSP IP/OBS DSCHRG MGMT >30: CPT

## 2020-01-13 ENCOUNTER — OUTPATIENT (OUTPATIENT)
Dept: OUTPATIENT SERVICES | Facility: HOSPITAL | Age: 25
LOS: 1 days | Discharge: HOME | End: 2020-01-13

## 2020-01-17 ENCOUNTER — EMERGENCY (EMERGENCY)
Facility: HOSPITAL | Age: 25
LOS: 1 days | Discharge: HOME | End: 2020-01-17
Admitting: EMERGENCY MEDICINE
Payer: COMMERCIAL

## 2020-01-17 VITALS
DIASTOLIC BLOOD PRESSURE: 80 MMHG | RESPIRATION RATE: 16 BRPM | TEMPERATURE: 98 F | HEART RATE: 113 BPM | SYSTOLIC BLOOD PRESSURE: 139 MMHG | OXYGEN SATURATION: 100 %

## 2020-01-17 PROCEDURE — 99283 EMERGENCY DEPT VISIT LOW MDM: CPT

## 2020-01-17 NOTE — ED ADULT NURSE NOTE - NSIMPLEMENTINTERV_GEN_ALL_ED
Implemented All Universal Safety Interventions:  [place] to call system. Call bell, personal items and telephone within reach. Instruct patient to call for assistance. Room bathroom lighting operational. Non-slip footwear when patient is off stretcher. Physically safe environment: no spills, clutter or unnecessary equipment. Stretcher in lowest position, wheels locked, appropriate side rails in place.

## 2020-01-17 NOTE — ED PROVIDER NOTE - PATIENT PORTAL LINK FT
You can access the FollowMyHealth Patient Portal offered by [facility] by registering at the following website: [email or web address]. By joining [employer]’s FollowMyHealth portal, you will also be able to view your health information using other applications (apps) compatible with our system.
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
## 2020-01-17 NOTE — ED PROVIDER NOTE - CLINICAL SUMMARY MEDICAL DECISION MAKING FREE TEXT BOX
25 y/o M hx Psychosis, Anxiety    Medical evaluation performed. There is no clinical evidence of intoxication or any acute medical problem requiring immediate intervention. Patient is awaiting psychiatric consultation. Final disposition will be determined by psychiatrist.
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
## 2020-01-18 VITALS
OXYGEN SATURATION: 99 % | SYSTOLIC BLOOD PRESSURE: 132 MMHG | RESPIRATION RATE: 16 BRPM | HEART RATE: 91 BPM | DIASTOLIC BLOOD PRESSURE: 71 MMHG

## 2020-01-18 DIAGNOSIS — F12.99 CANNABIS USE, UNSPECIFIED WITH UNSPECIFIED CANNABIS-INDUCED DISORDER: ICD-10-CM

## 2020-01-18 DIAGNOSIS — F21 SCHIZOTYPAL DISORDER: ICD-10-CM

## 2020-01-18 PROCEDURE — 90792 PSYCH DIAG EVAL W/MED SRVCS: CPT

## 2020-01-18 NOTE — ED BEHAVIORAL HEALTH ASSESSMENT NOTE - OTHER PAST PSYCHIATRIC HISTORY (INCLUDE DETAILS REGARDING ONSET, COURSE OF ILLNESS, INPATIENT/OUTPATIENT TREATMENT)
2018 hospitalization at [facility] and 3 past psych ER assessments, was seen at [facility] in October 2018 but never returned for follow-up. Diagnosed with unspecified psychosis at that time, believed to be suffering from a primary psychotic disorder. On an abilify HEALY in the past without consistent follow up. as per hpi
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
## 2020-01-18 NOTE — ED BEHAVIORAL HEALTH ASSESSMENT NOTE - DESCRIPTION
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 Student at [employer], studying Sociology calm, did not require any prns.   Vital Signs Last 24 Hrs  T(C): 36.7 (17 Jan 2020 23:09), Max: 36.7 (17 Jan 2020 23:09)  T(F): 98 (17 Jan 2020 23:09), Max: 98 (17 Jan 2020 23:09)  HR: 113 (17 Jan 2020 23:09) (113 - 113)  BP: 139/80 (17 Jan 2020 23:09) (139/80 - 139/80)  BP(mean): --  RR: 16 (17 Jan 2020 23:09) (16 - 16)  SpO2: 100% (17 Jan 2020 23:09) (100% - 100%)
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
## 2020-01-18 NOTE — ED ADULT NURSE REASSESSMENT NOTE - GENERAL PATIENT STATE
comfortable appearance/cooperative/remains awake, a&ox3, calm. Denies s/i h/i
resting/sleeping/NAD, even unlabored resp observed
NAD, even unlabored resp observed/resting/sleeping

## 2020-01-18 NOTE — ED BEHAVIORAL HEALTH ASSESSMENT NOTE - VIOLENCE RISK FACTORS:
Affective dysregulation/Noncompliance with treatment/Lack of insight into violence risk/need for treatment/Substance abuse/Irritability Noncompliance with treatment/Lack of insight into violence risk/need for treatment/Substance abuse
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
## 2020-01-18 NOTE — ED BEHAVIORAL HEALTH ASSESSMENT NOTE - DESCRIPTION (FIRST USE, LAST USE, QUANTITY, FREQUENCY, DURATION)
1-2 drinks/week. Pt denies drinking more than this, but is vague and laughs when describing his use every few days

## 2020-01-18 NOTE — ED BEHAVIORAL HEALTH ASSESSMENT NOTE - SUICIDE RISK FACTORS
Access to lethal methods (pills, firearm, etc.: Ask specifically about presence or absence of a firearm in the home or ease of accessing/Psychotic disorder current/past/Alcohol/Substance abuse disorders Alcohol/Substance abuse disorders/Psychotic disorder current/past

## 2020-01-18 NOTE — ED BEHAVIORAL HEALTH ASSESSMENT NOTE - SUICIDE PROTECTIVE FACTORS
Has future plans/Identifies reasons for living/Cheondoism beliefs/Engaged in work or school/Supportive social network of family or friends

## 2020-01-18 NOTE — ED BEHAVIORAL HEALTH ASSESSMENT NOTE - PRIMARY DX
Unspecified psychosis not due to a substance or known physiological condition Cannabis-related disorder Schizotypal personality disorder

## 2020-01-18 NOTE — ED ADULT NURSE REASSESSMENT NOTE - REASSESS COMMUNICATION
dc instructions, metrocards and shelter referral provided/ED physician notified
family informed/As per psych attending, pt to be held in ED overnight until morning for safe DC to shelter/ED physician notified
family informed/ED physician notified

## 2020-01-29 DIAGNOSIS — F12.10 CANNABIS ABUSE, UNCOMPLICATED: ICD-10-CM

## 2020-02-18 NOTE — ED BEHAVIORAL HEALTH ASSESSMENT NOTE - OTHER
HPI:    Patient ID: Riccardo Cox is a 61year old male who presents for cough. He has had the cough for over 3 weeks. When he wakes up in the morning he feels like he is coughing up along. He has postnasal drip. He has a very stuffy nose.   He denie Relationship status: Not on file      Intimate partner violence:        Fear of current or ex partner: Not on file        Emotionally abused: Not on file        Physically abused: Not on file        Forced sexual activity: Not on file    Other Topics adenopathy  Positive left maxillary sinus tenderness to palpation  Heart regular rate and rhythm without murmur  Lungs clear to auscultation  Abdomen soft without visceromegaly, masses, tenderness  Skin of right knee with 2.5 cm well demarcated lesion with attempt is made to correct errors during dictation, discrepancies may still exist. "amazing" Unclear if pt is experiencing AH of Gd's voice vs Delusions of knowing Gd's will Pt to remain under mom's direct supervision

## 2020-04-07 ENCOUNTER — EMERGENCY (EMERGENCY)
Facility: HOSPITAL | Age: 25
LOS: 1 days | Discharge: HOME | End: 2020-04-07
Admitting: EMERGENCY MEDICINE
Payer: COMMERCIAL

## 2020-04-07 VITALS
OXYGEN SATURATION: 97 % | HEART RATE: 101 BPM | RESPIRATION RATE: 18 BRPM | SYSTOLIC BLOOD PRESSURE: 144 MMHG | TEMPERATURE: 99 F | DIASTOLIC BLOOD PRESSURE: 99 MMHG

## 2020-04-07 DIAGNOSIS — F12.10 CANNABIS ABUSE, UNCOMPLICATED: ICD-10-CM

## 2020-04-07 DIAGNOSIS — F43.24 ADJUSTMENT DISORDER WITH DISTURBANCE OF CONDUCT: ICD-10-CM

## 2020-04-07 PROCEDURE — 90792 PSYCH DIAG EVAL W/MED SRVCS: CPT

## 2020-04-07 PROCEDURE — 99284 EMERGENCY DEPT VISIT MOD MDM: CPT
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
## 2020-04-07 NOTE — ED ADULT NURSE REASSESSMENT NOTE - NS ED NURSE REASSESS COMMENT FT1
There is noted documentation of Ileus after recent discharge for robotic R colectomy. .  Please clarify if this condition could be further specified as:   
 
=> Post op ileus - a complication of recent R colectomy surgery 
=> Post op ileus  unrelated to the surgery [specify cause] 
=> Other Explanation of clinical findings 
=> Unable to Determine (no explanation of clinical findings) The medical record reflects the following: 
 
-----> Risk: 72 yr old s/p recent robotic R colectomy -----> Clinical Indicators: * 10-29-18 H&P:  \". Lovetta Blaze Lovetta Blaze Ileus after recent discharge for robotic R colectomy. Lovetta Blaze Lovetta Blaze \" 
    *  10-28-18 CT Abd: \"1. NG tube placement. Adequate in position. 2.  Multiple dilated loops of small bowel. \" 
 
-----> Treatment: NG tube; CT; NPO; IVF Please clarify and document your clinical opinion in the progress notes and discharge summary including the definitive and/or presumptive diagnosis, (suspected or probable), related to the above clinical findings. Please include clinical findings supporting your diagnosis. If you DECLINE this query or would like to communicate with [facility], please utilize the \"[employer] message box\" at the TOP of the Progress Note on the right. Thank you, Santos Vang RN 
[phone] Patient cleared by psychiatry for discharge, NAD, pt currently awaiting discharge disposition.
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
## 2020-04-07 NOTE — ED BEHAVIORAL HEALTH ASSESSMENT NOTE - VIOLENCE RISK FACTORS:
Noncompliance with treatment/Substance abuse/Lack of insight into violence risk/need for treatment Noncompliance with treatment/Impulsivity/Lack of insight into violence risk/need for treatment/Substance abuse

## 2020-04-07 NOTE — ED ADULT NURSE REASSESSMENT NOTE - NS ED NURSE REASSESS COMMENT FT1
Patient is in improved and stable condition, discharged as per NP Bereczky order, discharge instructions given, pt verbalized understanding and left ER a&ox3 with metro card.

## 2020-04-07 NOTE — ED BEHAVIORAL HEALTH ASSESSMENT NOTE - DESCRIPTION
calm, did not require any prns.   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 Student at [employer], studying Sociology calm, did not require any prns.   Vital Signs Last 24 Hrs  T(C): 37.1 (07 Apr 2020 12:21), Max: 37.1 (07 Apr 2020 12:21)  T(F): 98.7 (07 Apr 2020 12:21), Max: 98.7 (07 Apr 2020 12:21)  HR: 101 (07 Apr 2020 12:21) (101 - 101)  BP: 144/99 (07 Apr 2020 12:21) (144/99 - 144/99)  BP(mean): --  RR: 18 (07 Apr 2020 12:21) (18 - 18)  SpO2: 97% (07 Apr 2020 12:21) (97% - 97%)
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
## 2020-04-07 NOTE — ED PROVIDER NOTE - PATIENT PORTAL LINK FT
You can access the FollowMyHealth Patient Portal offered by [facility] by registering at the following website: [email or web address]. By joining [employer]’s FollowMyHealth portal, you will also be able to view your health information using other applications (apps) compatible with our system.

## 2020-04-07 NOTE — ED BEHAVIORAL HEALTH ASSESSMENT NOTE - ACTIVATING EVENTS/STRESSORS
Non-compliant or not receiving treatment/Substance intoxication or withdrawal Non-compliant or not receiving treatment/Other

## 2020-04-07 NOTE — ED BEHAVIORAL HEALTH ASSESSMENT NOTE - SAFETY PLAN DETAILS
REURN TO ed OR CALL 911 if mood sx return or si/hi present call 911 or return to ED if symptoms worsen or if pt develops thoughts of harming self or others
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
## 2020-04-07 NOTE — ED PROVIDER NOTE - NS ED ROS FT
Psych: + acting out, aggressive behaviour, +irritability   Constitutional: no weight loss, no fever no chills, no exposure to covid  	•	RESPIRATORY - no shortness of breath, no cough  	•	CARDIAC - no chest pain, no palpitations  	•	GI - no abd pain, no nausea, no vomiting, no diarrhea, no constipation, no bleeding  	•	MUSCULOSKELETAL - no joint paint, no swelling, no redness  	•	NEUROLOGIC - no weakness, no headache, no anesthesia, no paresthesias

## 2020-04-07 NOTE — ED BEHAVIORAL HEALTH ASSESSMENT NOTE - SUICIDE PROTECTIVE FACTORS
Gnosticist beliefs/Engaged in work or school/Has future plans/Identifies reasons for living/Supportive social network of family or friends

## 2020-04-07 NOTE — ED ADULT NURSE NOTE - NSIMPLEMENTINTERV_GEN_ALL_ED
Implemented All Universal Safety Interventions:  [place] to call system. Call bell, personal items and telephone within reach. Instruct patient to call for assistance. Room bathroom lighting operational. Non-slip footwear when patient is off stretcher. Physically safe environment: no spills, clutter or unnecessary equipment. Stretcher in lowest position, wheels locked, appropriate side rails in place.

## 2020-04-07 NOTE — ED BEHAVIORAL HEALTH ASSESSMENT NOTE - SUICIDE RISK FACTORS
Alcohol/Substance abuse disorders/Psychotic disorder current/past Impulsivity/Alcohol/Substance abuse disorders

## 2020-04-07 NOTE — ED PROVIDER NOTE - NSFOLLOWUPCLINICS_GEN_ALL_ED_FT
[facility]  Behavioral Health  [address] [place] [address]  Phone: [phone]  Fax:   Follow Up Time:

## 2020-04-07 NOTE — ED BEHAVIORAL HEALTH ASSESSMENT NOTE - OTHER
family CVM arguments with parents impaired when intoxicated argument with family chronically impaired

## 2020-04-07 NOTE — ED BEHAVIORAL HEALTH ASSESSMENT NOTE - PRIMARY DX
Cannabis-related disorder Schizotypal personality disorder Adjustment disorder with disturbance of conduct

## 2020-04-07 NOTE — ED ADULT NURSE NOTE - NS ED NURSE LEVEL OF CONSCIOUSNESS ORIENTATION
Date of Service: 10/30/2017    HISTORY OF PRESENT ILLNESS:  The patient is a 80-year-old gentleman being followed by me for ESRD management.  The patient was seen by me while being hemodialyzed.  He is complaining of feeling cold but tolerating dialysis otherwise as well.    PHYSICAL EXAMINATION:  GENERAL:  Awake, lying supine, not in any acute distress.  VITAL SIGNS:  Blood pressure 115/55.  Heart rate 60 per minute, temperature 97.9 Fahrenheit.  Oxygen saturation 98% on room air.  LUNGS:  Breath sounds positive bilaterally anteriorly.  No wheezing, rhonchi, or crackles appreciated.  HEART:  S1, S2 audible.  ABDOMEN:  Soft, nontender.  EXTREMITIES:  No leg edema.  Left arm AV fistula good blood flow.    REVIEW:  Today, sodium 133, potassium 5.3, BUN 67, creatinine 8.6, hemoglobin 9.2.    ASSESSMENT:  1.  End-stage renal disease.  2.  Anemia.  3.  Hyponatremia.  4.  Hyperkalemia.  5.  Elevated liver enzymes with choledocholithiasis, status post endoscopic retrograde cholangiopancreatography.    PLAN:  From nephrology point of view is hemodialysis as ordered.  Epogen for anemia management as ordered, and patient is expected to be discharged by the hospitalist later today.  Discharge planning is though deferred to hospitalist and patient is advised to follow up in the outpatient clinic for continuation of chronic dialysis on [id number].      Dictated By: Mike Henao MD  Signing Provider: Mike Henao MD    QQ/EO ([id number])  DD: 10/30/2017 13:09:40 TD: 10/30/2017 13:26:36    Copy Sent To:    Oriented - self; Oriented - place; Oriented - time

## 2020-04-07 NOTE — ED BEHAVIORAL HEALTH ASSESSMENT NOTE - REFERRAL / APPOINTMENT DETAILS
pt declines any refrrals, currently enrolled in [facility] pt provided with information for [facility]; he declines referrals for substance abuse treatment

## 2020-05-23 ENCOUNTER — INPATIENT (INPATIENT)
Facility: HOSPITAL | Age: 25
LOS: 9 days | Discharge: HOME | End: 2020-06-02
Attending: PSYCHIATRY & NEUROLOGY | Admitting: PSYCHIATRY & NEUROLOGY
Payer: COMMERCIAL

## 2020-05-23 VITALS
TEMPERATURE: 99 F | DIASTOLIC BLOOD PRESSURE: 77 MMHG | OXYGEN SATURATION: 99 % | HEART RATE: 106 BPM | SYSTOLIC BLOOD PRESSURE: 123 MMHG | RESPIRATION RATE: 18 BRPM

## 2020-05-23 DIAGNOSIS — F29 UNSPECIFIED PSYCHOSIS NOT DUE TO A SUBSTANCE OR KNOWN PHYSIOLOGICAL CONDITION: ICD-10-CM

## 2020-05-23 DIAGNOSIS — F48.9 NONPSYCHOTIC MENTAL DISORDER, UNSPECIFIED: ICD-10-CM

## 2020-05-23 DIAGNOSIS — F12.10 CANNABIS ABUSE, UNCOMPLICATED: ICD-10-CM

## 2020-05-23 LAB
ALBUMIN SERPL ELPH-MCNC: 4.9 G/DL — SIGNIFICANT CHANGE UP (ref 3.3–5)
ALP SERPL-CCNC: 55 U/L — SIGNIFICANT CHANGE UP (ref 40–120)
ALT FLD-CCNC: 24 U/L — SIGNIFICANT CHANGE UP (ref 4–41)
ANION GAP SERPL CALC-SCNC: 13 MMO/L — SIGNIFICANT CHANGE UP (ref 7–14)
APAP SERPL-MCNC: < 15 UG/ML — LOW (ref 15–25)
AST SERPL-CCNC: 30 U/L — SIGNIFICANT CHANGE UP (ref 4–40)
BASOPHILS # BLD AUTO: 0.03 K/UL — SIGNIFICANT CHANGE UP (ref 0–0.2)
BASOPHILS NFR BLD AUTO: 0.5 % — SIGNIFICANT CHANGE UP (ref 0–2)
BILIRUB SERPL-MCNC: 0.4 MG/DL — SIGNIFICANT CHANGE UP (ref 0.2–1.2)
BUN SERPL-MCNC: 18 MG/DL — SIGNIFICANT CHANGE UP (ref 7–23)
CALCIUM SERPL-MCNC: 9.7 MG/DL — SIGNIFICANT CHANGE UP (ref 8.4–10.5)
CHLORIDE SERPL-SCNC: 103 MMOL/L — SIGNIFICANT CHANGE UP (ref 98–107)
CO2 SERPL-SCNC: 22 MMOL/L — SIGNIFICANT CHANGE UP (ref 22–31)
CREAT SERPL-MCNC: 1.14 MG/DL — SIGNIFICANT CHANGE UP (ref 0.5–1.3)
EOSINOPHIL # BLD AUTO: 0.11 K/UL — SIGNIFICANT CHANGE UP (ref 0–0.5)
EOSINOPHIL NFR BLD AUTO: 2 % — SIGNIFICANT CHANGE UP (ref 0–6)
ETHANOL BLD-MCNC: < 10 MG/DL — SIGNIFICANT CHANGE UP
GLUCOSE SERPL-MCNC: 99 MG/DL — SIGNIFICANT CHANGE UP (ref 70–99)
HCT VFR BLD CALC: 44.5 % — SIGNIFICANT CHANGE UP (ref 39–50)
HGB BLD-MCNC: 14.5 G/DL — SIGNIFICANT CHANGE UP (ref 13–17)
IMM GRANULOCYTES NFR BLD AUTO: 0.2 % — SIGNIFICANT CHANGE UP (ref 0–1.5)
LYMPHOCYTES # BLD AUTO: 1.01 K/UL — SIGNIFICANT CHANGE UP (ref 1–3.3)
LYMPHOCYTES # BLD AUTO: 18.4 % — SIGNIFICANT CHANGE UP (ref 13–44)
MCHC RBC-ENTMCNC: 27.1 PG — SIGNIFICANT CHANGE UP (ref 27–34)
MCHC RBC-ENTMCNC: 32.6 % — SIGNIFICANT CHANGE UP (ref 32–36)
MCV RBC AUTO: 83.2 FL — SIGNIFICANT CHANGE UP (ref 80–100)
MONOCYTES # BLD AUTO: 0.35 K/UL — SIGNIFICANT CHANGE UP (ref 0–0.9)
MONOCYTES NFR BLD AUTO: 6.4 % — SIGNIFICANT CHANGE UP (ref 2–14)
NEUTROPHILS # BLD AUTO: 3.97 K/UL — SIGNIFICANT CHANGE UP (ref 1.8–7.4)
NEUTROPHILS NFR BLD AUTO: 72.5 % — SIGNIFICANT CHANGE UP (ref 43–77)
NRBC # FLD: 0 K/UL — SIGNIFICANT CHANGE UP (ref 0–0)
PLATELET # BLD AUTO: 162 K/UL — SIGNIFICANT CHANGE UP (ref 150–400)
PMV BLD: 11 FL — SIGNIFICANT CHANGE UP (ref 7–13)
POTASSIUM SERPL-MCNC: 4.5 MMOL/L — SIGNIFICANT CHANGE UP (ref 3.5–5.3)
POTASSIUM SERPL-SCNC: 4.5 MMOL/L — SIGNIFICANT CHANGE UP (ref 3.5–5.3)
PROT SERPL-MCNC: 7.9 G/DL — SIGNIFICANT CHANGE UP (ref 6–8.3)
RBC # BLD: 5.35 M/UL — SIGNIFICANT CHANGE UP (ref 4.2–5.8)
RBC # FLD: 12.7 % — SIGNIFICANT CHANGE UP (ref 10.3–14.5)
SALICYLATES SERPL-MCNC: < 5 MG/DL — LOW (ref 15–30)
SARS-COV-2 RNA SPEC QL NAA+PROBE: SIGNIFICANT CHANGE UP
SODIUM SERPL-SCNC: 138 MMOL/L — SIGNIFICANT CHANGE UP (ref 135–145)
TSH SERPL-MCNC: 1.78 UIU/ML — SIGNIFICANT CHANGE UP (ref 0.27–4.2)
WBC # BLD: 5.48 K/UL — SIGNIFICANT CHANGE UP (ref 3.8–10.5)
WBC # FLD AUTO: 5.48 K/UL — SIGNIFICANT CHANGE UP (ref 3.8–10.5)

## 2020-05-23 PROCEDURE — 99285 EMERGENCY DEPT VISIT HI MDM: CPT
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
## 2020-05-23 NOTE — ED BEHAVIORAL HEALTH ASSESSMENT NOTE - VIOLENCE RISK FACTORS:
Lack of insight into violence risk/need for treatment/Noncompliance with treatment/Substance abuse/Impulsivity

## 2020-05-23 NOTE — ED PROVIDER NOTE - OBJECTIVE STATEMENT
23 y/o M hx Psychosis BIBA  secondary to aggression and acting out behaviour at home. EMS reported that patient was found sitting close to [facility] yesterday . Patient appears paranoid stating that he is " GOD".  Denies SI/AH/HI/VH.  Denies falling, punching or kicking any objects. Denies pain, SOB, dizziness  fever, chills  chest/ abdominal discomfort. Denies recent use of alcohol or illicit drugs. No evidence of physical injuries, broken skin or deformities. Admits  to medication  non compliance.

## 2020-05-23 NOTE — ED ADULT NURSE REASSESSMENT NOTE - NS ED NURSE REASSESS COMMENT FT1
Pt calm & cooperative made aware of admission to  [facility], pt denies si/hi/avh safety & comfort measures maintained eval on going. Pt calm & cooperative made aware of admission to  [facility], pt denies si/hi/avh EMS activated enroute safety & comfort measures maintained eval on going.

## 2020-05-23 NOTE — ED BEHAVIORAL HEALTH ASSESSMENT NOTE - DIFFERENTIAL
Unspecified psychosis  substance induced psychosis  antisocial pd Unspecified psychosis  substance induced psychosis  schizotypal pd   antisocial pd

## 2020-05-23 NOTE — ED BEHAVIORAL HEALTH ASSESSMENT NOTE - ACTIVATING EVENTS/STRESSORS
Improving sodium levels. PO fluid restriction. Will follow lytes and RF trend. Avoid hypotonic fluids. Non-compliant or not receiving treatment/Other

## 2020-05-23 NOTE — ED PROVIDER NOTE - CHPI ED SYMPTOMS POS
ANXIETY/PARANOIA/AGITATION/IRRITABILITY ANXIETY/PARANOIA/ACTING OUT BEHAVIORS/AGITATION/IRRITABILITY
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
## 2020-05-23 NOTE — ED BEHAVIORAL HEALTH ASSESSMENT NOTE - OTHER
mother and father CVM argument with family chronically impaired cannabis use superficially cooperative, guarded, irritable at times odd currently intact odd, guarded disorganized at times
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
## 2020-05-23 NOTE — ED PROVIDER NOTE - CLINICAL SUMMARY MEDICAL DECISION MAKING FREE TEXT BOX
Labs, Urine Tox/UA, EKG  Medical evaluation performed. There is no clinical evidence of intoxication or any acute medical problem requiring immediate intervention. Patient is awaiting psychiatric consultation. Final disposition will be determined by psychiatrist. 25 y/o M hx Psychosis   Labs, Urine Tox/UA, EKG  Medical evaluation performed. There is no clinical evidence of intoxication or any acute medical problem requiring immediate intervention. Patient is awaiting psychiatric consultation. Final disposition will be determined by psychiatrist.

## 2020-05-23 NOTE — ED ADULT NURSE NOTE - OBJECTIVE STATEMENT
Received pt in  pt calm & cooperative denies si/hi/avh presently, pt bought in by EMS for erratic behaviour safety & comfort measures maintained eval on going.
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
## 2020-05-23 NOTE — ED BEHAVIORAL HEALTH NOTE - BEHAVIORAL HEALTH NOTE
SERAFIN contacted patient's father, Garrett Joyce [phone], to inform him of son's admission. SERAFIN informed father patient will be admitted to [facility]. Patient's father requested to speak with psychiatrist, SERAFIN informed Dr. Mayen of family's request for her to contact them.
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
## 2020-05-23 NOTE — ED BEHAVIORAL HEALTH ASSESSMENT NOTE - DESCRIPTION
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 Student at [employer], studying Sociology in behavioral control    Vital Signs Last 24 Hrs  T(C): 37.2 (23 May 2020 15:13), Max: 37.2 (23 May 2020 15:13)  T(F): 99 (23 May 2020 15:13), Max: 99 (23 May 2020 15:13)  HR: 106 (23 May 2020 15:13) (106 - 106)  BP: 123/77 (23 May 2020 15:13) (123/77 - 123/77)  BP(mean): --  RR: 18 (23 May 2020 15:13) (18 - 18)  SpO2: 99% (23 May 2020 15:13) (99% - 99%)

## 2020-05-23 NOTE — ED ADULT TRIAGE NOTE - BP NONINVASIVE DIASTOLIC (MM HG)
PROCEDURE: Mohs' Micrographic Surgery    INDICATION: Biopsy-proven skin cancer of cosmetically and functionally important areas, including head, neck, genital, hand, foot, or areas known for having difficulty in healing, such as the lower anterior legs. Tumor with ill-defined borders.    REFERRING MD: Maria D Huddleston M.D.    CASE NUMBER:     ANESTHETIC: 5 cc 0.5% Lidocaine with Epi 1:200,000 mixed 1:1 with 0.5% Bupivacaine    SURGICAL PREP: Hibiclens    SURGEON: Reyna Jordan MD    ASSISTANTS: María Elena Chery PA-C and Rhiannon Crowell, Surg Tech    PREOPERATIVE DIAGNOSIS: basal cell carcinoma    POSTOPERATIVE DIAGNOSIS: basal cell carcinoma    PATHOLOGIC DIAGNOSIS: basal cell carcinoma- nodular, infiltrating    HISTOLOGY OF SPECIMENS IN FIRST STAGE:   Tumor Type: Tumor seen. Nodular basal cell carcinoma: Nodular tumor in dermis composed of basaloid cells exhibiting peripheral palisading and retraction artifact.  Infiltrative basal cell carcinoma: Basaloid tumor in dermis characterized by thin elongated strands of basaloid cells infiltrating between dermal collagen bundles.   Depth of Invasion: epidermis and dermis  Perineural Invasion: No    HISTOLOGY OF SPECIMENS IN SUBSEQUENT STAGES:  · Tumor Type: No tumor seen.    STAGES OF MOHS' SURGERY PERFORMED: 2    TUMOR-FREE PLANE ACHIEVED: Yes    HEMOSTASIS: electrocoagulation     SPECIMENS: 5 (2 in stage A and 3 in stage B)    LOCATION: left anterior neck. Patient verified location with hand held mirror.    INITIAL LESION SIZE: 0.5 x 0.9 cm    FINAL DEFECT SIZE: 1.8 x 2.0 cm    WOUND REPAIR/DISPOSITION: The patient tolerated Mohs' Micrographic Surgery for a basal cell carcinoma very well. When the tumor was completely removed, a repair of the surgical defect was undertaken.      PROCEDURE: Complex Linear Repair    INDICATION: Status post Mohs' Micrographic Surgery for basal cell carcinoma.    CASE NUMBER:     SURGEON: Reyna Jordan MD    ASSISTANTS: María Elena Chery  "DAYSI and Desiree Morrissey    ANESTHETIC: 4 cc 1% Lidocaine with Epinephrine 1:100,000    SURGICAL PREP: Hibiclens, prepped by Desiree Morrissey    LOCATION: left anterior neck    DEFECT SIZE: 1.8 x 2.0 cm    WOUND REPAIR/DISPOSITION:  After the patient's carcinoma had been completely removed with Mohs' Micrographic Surgery, a repair of the surgical defect was undertaken. The patient was returned to the operating suite where the area of left anterior neck was prepped, draped, and anesthetized in the usual sterile fashion. The wound was widely undermined in all directions. Then, electrocoagulation was used to obtain meticulous hemostasis. 4-0 Vicryl buried vertical mattress sutures were placed into the subcutaneous and dermal plane to close the wound and benito the cutaneous wound edge. Bilateral dog ears were identified and were removed by a standard Burow's triangle technique. The cutaneous wound edges were closed using interrupted 4-0 Prolene suture.    The patient tolerated the procedure well.    The area was cleaned and dressed appropriately and the patient was given wound care instructions, as well as appointment for follow-up evaluation. Patient was placed on Norco 5-325 mg prn postop pain.    LENGTH OF REPAIR: 4 cm    Vitals:    11/22/19 1120 11/22/19 1316   BP: (!) 154/72 (!) 146/79   BP Location: Right arm Right arm   Patient Position: Sitting Sitting   BP Method: Large (Automatic) Small (Automatic)   Pulse: (!) 55 (!) 54   Weight: 113.9 kg (251 lb 1.7 oz)    Height: 6' 4" (1.93 m)          " 77

## 2020-05-23 NOTE — ED BEHAVIORAL HEALTH ASSESSMENT NOTE - AXIS IV
Problem related to social environment/Housing problems Problem related to social environment/Educational problems

## 2020-05-23 NOTE — ED BEHAVIORAL HEALTH ASSESSMENT NOTE - REFERRAL / APPOINTMENT DETAILS
pt provided with information for [facility]; he declines referrals for substance abuse treatment

## 2020-05-23 NOTE — ED BEHAVIORAL HEALTH ASSESSMENT NOTE - SUICIDE PROTECTIVE FACTORS
Engaged in work or school/Nondenominational beliefs/Identifies reasons for living/Has future plans/Supportive social network of family or friends Identifies reasons for living/Has future plans/Anabaptist beliefs/Supportive social network of family or friends

## 2020-05-23 NOTE — ED BEHAVIORAL HEALTH ASSESSMENT NOTE - DETAILS
none see HPI Mother reports increase in impulsivity and sexuality when on Abilify mom informed SAMRA informed father

## 2020-05-23 NOTE — ED BEHAVIORAL HEALTH ASSESSMENT NOTE - PRIMARY DX
Adjustment disorder with disturbance of conduct Schizotypal personality disorder Psychosis, unspecified psychosis type Deferred condition on axis II

## 2020-05-24 PROCEDURE — 99222 1ST HOSP IP/OBS MODERATE 55: CPT

## 2020-05-24 RX ORDER — RISPERIDONE 4 MG/1
1 TABLET ORAL AT BEDTIME
Refills: 0 | Status: DISCONTINUED | OUTPATIENT
Start: 2020-05-24 | End: 2020-06-02

## 2020-05-25 LAB
CHOLEST SERPL-MCNC: 180 MG/DL — SIGNIFICANT CHANGE UP (ref 120–199)
HBA1C BLD-MCNC: 5.2 % — SIGNIFICANT CHANGE UP (ref 4–5.6)
HDLC SERPL-MCNC: 51 MG/DL — SIGNIFICANT CHANGE UP (ref 35–55)
LIPID PNL WITH DIRECT LDL SERPL: 129 MG/DL — SIGNIFICANT CHANGE UP
TRIGL SERPL-MCNC: 70 MG/DL — SIGNIFICANT CHANGE UP (ref 10–149)

## 2020-05-25 PROCEDURE — 99232 SBSQ HOSP IP/OBS MODERATE 35: CPT

## 2020-05-25 NOTE — ED BEHAVIORAL HEALTH NOTE - BEHAVIORAL HEALTH NOTE
Writer contacted [facility] of [facility] at [phone] and spoke with Romelia Henderson already called in by SERAFIN Ling with approval of 9 units (5/23-6/1) with auth #[id number]. Concurrent review due 6/1 with reviewer NEELIMA.

## 2020-05-26 PROCEDURE — 99232 SBSQ HOSP IP/OBS MODERATE 35: CPT

## 2020-05-27 PROCEDURE — 99232 SBSQ HOSP IP/OBS MODERATE 35: CPT

## 2020-05-28 PROCEDURE — 99232 SBSQ HOSP IP/OBS MODERATE 35: CPT

## 2020-05-29 PROCEDURE — 99232 SBSQ HOSP IP/OBS MODERATE 35: CPT

## 2020-05-30 RX ORDER — NICOTINE POLACRILEX 2 MG
2 GUM BUCCAL
Refills: 0 | Status: DISCONTINUED | OUTPATIENT
Start: 2020-05-30 | End: 2020-06-02

## 2020-05-30 RX ADMIN — Medication 2 MILLIGRAM(S): at 16:07

## 2020-06-02 VITALS — SYSTOLIC BLOOD PRESSURE: 114 MMHG | DIASTOLIC BLOOD PRESSURE: 75 MMHG | HEART RATE: 88 BPM | TEMPERATURE: 98 F

## 2020-06-12 ENCOUNTER — INPATIENT (INPATIENT)
Facility: HOSPITAL | Age: 25
LOS: 16 days | Discharge: HOME | End: 2020-06-29
Attending: PSYCHIATRY & NEUROLOGY | Admitting: PSYCHIATRY & NEUROLOGY
Payer: COMMERCIAL

## 2020-06-12 ENCOUNTER — EMERGENCY (EMERGENCY)
Facility: HOSPITAL | Age: 25
LOS: 1 days | Discharge: PSYCHIATRIC OR SUBSTANCE USE FACILITY | End: 2020-06-12
Attending: EMERGENCY MEDICINE
Payer: COMMERCIAL

## 2020-06-12 VITALS
OXYGEN SATURATION: 99 % | WEIGHT: 149.91 LBS | TEMPERATURE: 98 F | RESPIRATION RATE: 16 BRPM | DIASTOLIC BLOOD PRESSURE: 97 MMHG | HEIGHT: 68 IN | HEART RATE: 116 BPM | SYSTOLIC BLOOD PRESSURE: 152 MMHG

## 2020-06-12 VITALS
SYSTOLIC BLOOD PRESSURE: 124 MMHG | DIASTOLIC BLOOD PRESSURE: 70 MMHG | RESPIRATION RATE: 16 BRPM | HEART RATE: 74 BPM | OXYGEN SATURATION: 100 % | TEMPERATURE: 98 F

## 2020-06-12 DIAGNOSIS — F29 UNSPECIFIED PSYCHOSIS NOT DUE TO A SUBSTANCE OR KNOWN PHYSIOLOGICAL CONDITION: ICD-10-CM

## 2020-06-12 DIAGNOSIS — F25.0 SCHIZOAFFECTIVE DISORDER, BIPOLAR TYPE: ICD-10-CM

## 2020-06-12 DIAGNOSIS — F12.10 CANNABIS ABUSE, UNCOMPLICATED: ICD-10-CM

## 2020-06-12 LAB
ALBUMIN SERPL ELPH-MCNC: 5.3 G/DL — HIGH (ref 3.3–5)
ALP SERPL-CCNC: 65 U/L — SIGNIFICANT CHANGE UP (ref 40–120)
ALT FLD-CCNC: 59 U/L — HIGH (ref 10–45)
ANION GAP SERPL CALC-SCNC: 14 MMOL/L — SIGNIFICANT CHANGE UP (ref 5–17)
APAP SERPL-MCNC: <15 UG/ML — SIGNIFICANT CHANGE UP (ref 10–30)
APPEARANCE UR: ABNORMAL
AST SERPL-CCNC: 37 U/L — SIGNIFICANT CHANGE UP (ref 10–40)
BASOPHILS # BLD AUTO: 0.02 K/UL — SIGNIFICANT CHANGE UP (ref 0–0.2)
BASOPHILS NFR BLD AUTO: 0.3 % — SIGNIFICANT CHANGE UP (ref 0–2)
BILIRUB SERPL-MCNC: 0.5 MG/DL — SIGNIFICANT CHANGE UP (ref 0.2–1.2)
BILIRUB UR-MCNC: NEGATIVE — SIGNIFICANT CHANGE UP
BUN SERPL-MCNC: 16 MG/DL — SIGNIFICANT CHANGE UP (ref 7–23)
CALCIUM SERPL-MCNC: 9.4 MG/DL — SIGNIFICANT CHANGE UP (ref 8.4–10.5)
CHLORIDE SERPL-SCNC: 100 MMOL/L — SIGNIFICANT CHANGE UP (ref 96–108)
CO2 SERPL-SCNC: 23 MMOL/L — SIGNIFICANT CHANGE UP (ref 22–31)
COLOR SPEC: SIGNIFICANT CHANGE UP
CREAT SERPL-MCNC: 0.91 MG/DL — SIGNIFICANT CHANGE UP (ref 0.5–1.3)
DIFF PNL FLD: NEGATIVE — SIGNIFICANT CHANGE UP
EOSINOPHIL # BLD AUTO: 0.13 K/UL — SIGNIFICANT CHANGE UP (ref 0–0.5)
EOSINOPHIL NFR BLD AUTO: 1.8 % — SIGNIFICANT CHANGE UP (ref 0–6)
ETHANOL SERPL-MCNC: SIGNIFICANT CHANGE UP MG/DL (ref 0–10)
GLUCOSE SERPL-MCNC: 127 MG/DL — HIGH (ref 70–99)
GLUCOSE UR QL: NEGATIVE — SIGNIFICANT CHANGE UP
HCT VFR BLD CALC: 50.9 % — HIGH (ref 39–50)
HGB BLD-MCNC: 16.5 G/DL — SIGNIFICANT CHANGE UP (ref 13–17)
IMM GRANULOCYTES NFR BLD AUTO: 0.1 % — SIGNIFICANT CHANGE UP (ref 0–1.5)
KETONES UR-MCNC: NEGATIVE — SIGNIFICANT CHANGE UP
LEUKOCYTE ESTERASE UR-ACNC: NEGATIVE — SIGNIFICANT CHANGE UP
LYMPHOCYTES # BLD AUTO: 2.5 K/UL — SIGNIFICANT CHANGE UP (ref 1–3.3)
LYMPHOCYTES # BLD AUTO: 34.6 % — SIGNIFICANT CHANGE UP (ref 13–44)
MCHC RBC-ENTMCNC: 27.8 PG — SIGNIFICANT CHANGE UP (ref 27–34)
MCHC RBC-ENTMCNC: 32.4 GM/DL — SIGNIFICANT CHANGE UP (ref 32–36)
MCV RBC AUTO: 85.8 FL — SIGNIFICANT CHANGE UP (ref 80–100)
MONOCYTES # BLD AUTO: 0.33 K/UL — SIGNIFICANT CHANGE UP (ref 0–0.9)
MONOCYTES NFR BLD AUTO: 4.6 % — SIGNIFICANT CHANGE UP (ref 2–14)
NEUTROPHILS # BLD AUTO: 4.24 K/UL — SIGNIFICANT CHANGE UP (ref 1.8–7.4)
NEUTROPHILS NFR BLD AUTO: 58.6 % — SIGNIFICANT CHANGE UP (ref 43–77)
NITRITE UR-MCNC: NEGATIVE — SIGNIFICANT CHANGE UP
NRBC # BLD: 0 /100 WBCS — SIGNIFICANT CHANGE UP (ref 0–0)
PH UR: 7.5 — SIGNIFICANT CHANGE UP (ref 5–8)
PLATELET # BLD AUTO: 193 K/UL — SIGNIFICANT CHANGE UP (ref 150–400)
POTASSIUM SERPL-MCNC: 4.3 MMOL/L — SIGNIFICANT CHANGE UP (ref 3.5–5.3)
POTASSIUM SERPL-SCNC: 4.3 MMOL/L — SIGNIFICANT CHANGE UP (ref 3.5–5.3)
PROT SERPL-MCNC: 8.3 G/DL — SIGNIFICANT CHANGE UP (ref 6–8.3)
PROT UR-MCNC: NEGATIVE — SIGNIFICANT CHANGE UP
RBC # BLD: 5.93 M/UL — HIGH (ref 4.2–5.8)
RBC # FLD: 12.7 % — SIGNIFICANT CHANGE UP (ref 10.3–14.5)
SALICYLATES SERPL-MCNC: <2 MG/DL — LOW (ref 15–30)
SARS-COV-2 RNA SPEC QL NAA+PROBE: SIGNIFICANT CHANGE UP
SODIUM SERPL-SCNC: 137 MMOL/L — SIGNIFICANT CHANGE UP (ref 135–145)
SP GR SPEC: 1.02 — SIGNIFICANT CHANGE UP (ref 1.01–1.02)
TSH SERPL-MCNC: 0.89 UIU/ML — SIGNIFICANT CHANGE UP (ref 0.27–4.2)
UROBILINOGEN FLD QL: NEGATIVE — SIGNIFICANT CHANGE UP
WBC # BLD: 7.23 K/UL — SIGNIFICANT CHANGE UP (ref 3.8–10.5)
WBC # FLD AUTO: 7.23 K/UL — SIGNIFICANT CHANGE UP (ref 3.8–10.5)

## 2020-06-12 PROCEDURE — 99285 EMERGENCY DEPT VISIT HI MDM: CPT | Mod: 25

## 2020-06-12 PROCEDURE — 80307 DRUG TEST PRSMV CHEM ANLYZR: CPT

## 2020-06-12 PROCEDURE — 85027 COMPLETE CBC AUTOMATED: CPT

## 2020-06-12 PROCEDURE — 90792 PSYCH DIAG EVAL W/MED SRVCS: CPT

## 2020-06-12 PROCEDURE — 96374 THER/PROPH/DIAG INJ IV PUSH: CPT

## 2020-06-12 PROCEDURE — 93005 ELECTROCARDIOGRAM TRACING: CPT

## 2020-06-12 PROCEDURE — 93010 ELECTROCARDIOGRAM REPORT: CPT | Mod: NC

## 2020-06-12 PROCEDURE — 80053 COMPREHEN METABOLIC PANEL: CPT

## 2020-06-12 PROCEDURE — 84443 ASSAY THYROID STIM HORMONE: CPT

## 2020-06-12 PROCEDURE — 81001 URINALYSIS AUTO W/SCOPE: CPT

## 2020-06-12 PROCEDURE — 99285 EMERGENCY DEPT VISIT HI MDM: CPT

## 2020-06-12 RX ORDER — BENZTROPINE MESYLATE 1 MG
1 TABLET ORAL
Refills: 0 | Status: DISCONTINUED | OUTPATIENT
Start: 2020-06-12 | End: 2020-06-16

## 2020-06-12 RX ORDER — BENZTROPINE MESYLATE 1 MG
1 TABLET ORAL
Refills: 0 | Status: DISCONTINUED | OUTPATIENT
Start: 2020-06-12 | End: 2020-06-22

## 2020-06-12 RX ORDER — SODIUM CHLORIDE 9 MG/ML
1000 INJECTION, SOLUTION INTRAVENOUS ONCE
Refills: 0 | Status: COMPLETED | OUTPATIENT
Start: 2020-06-12 | End: 2020-06-12

## 2020-06-12 RX ORDER — DIPHENHYDRAMINE HCL 50 MG
50 CAPSULE ORAL ONCE
Refills: 0 | Status: COMPLETED | OUTPATIENT
Start: 2020-06-12 | End: 2020-06-12

## 2020-06-12 RX ORDER — OLANZAPINE 15 MG/1
5 TABLET, FILM COATED ORAL ONCE
Refills: 0 | Status: DISCONTINUED | OUTPATIENT
Start: 2020-06-12 | End: 2020-06-29

## 2020-06-12 RX ORDER — RISPERIDONE 4 MG/1
1 TABLET ORAL
Refills: 0 | Status: DISCONTINUED | OUTPATIENT
Start: 2020-06-12 | End: 2020-06-22

## 2020-06-12 RX ORDER — NICOTINE POLACRILEX 2 MG
2 GUM BUCCAL
Refills: 0 | Status: DISCONTINUED | OUTPATIENT
Start: 2020-06-12 | End: 2020-06-29

## 2020-06-12 RX ORDER — RISPERIDONE 4 MG/1
1 TABLET ORAL
Refills: 0 | Status: DISCONTINUED | OUTPATIENT
Start: 2020-06-12 | End: 2020-06-16

## 2020-06-12 RX ORDER — OLANZAPINE 15 MG/1
5 TABLET, FILM COATED ORAL EVERY 6 HOURS
Refills: 0 | Status: DISCONTINUED | OUTPATIENT
Start: 2020-06-12 | End: 2020-06-13

## 2020-06-12 RX ORDER — BENZTROPINE MESYLATE 1 MG
1 TABLET ORAL EVERY 6 HOURS
Refills: 0 | Status: DISCONTINUED | OUTPATIENT
Start: 2020-06-12 | End: 2020-06-16

## 2020-06-12 RX ADMIN — SODIUM CHLORIDE 1000 MILLILITER(S): 9 INJECTION, SOLUTION INTRAVENOUS at 14:39

## 2020-06-12 RX ADMIN — Medication 1 MILLIGRAM(S): at 18:18

## 2020-06-12 RX ADMIN — Medication 50 MILLIGRAM(S): at 12:40

## 2020-06-12 RX ADMIN — SODIUM CHLORIDE 1000 MILLILITER(S): 9 INJECTION, SOLUTION INTRAVENOUS at 12:47

## 2020-06-12 NOTE — BEHAVIORAL HEALTH ASSESSMENT NOTE - NSBHCHARTREVIEWINVESTIGATE_PSY_A_CORE FT
Ventricular Rate 83 BPM    Atrial Rate 83 BPM    P-R Interval 168 ms    QRS Duration 86 ms    Q-T Interval 370 ms    QTC Calculation(Bezet) 434 ms    P Axis 70 degrees    R Axis 82 degrees    T Axis 58 degrees    Diagnosis Line Normal sinus rhythm with sinus arrhythmia  ST elevation, consider early repolarization  Borderline ECG

## 2020-06-12 NOTE — BEHAVIORAL HEALTH ASSESSMENT NOTE - OTHER
superficially cooperative, guarded, irritable at times currently intact disorganized at times cannabis use

## 2020-06-12 NOTE — ED ADULT NURSE NOTE - NSIMPLEMENTINTERV_GEN_ALL_ED
Implemented All Fall Risk Interventions:  [place] to call system. Call bell, personal items and telephone within reach. Instruct patient to call for assistance. Room bathroom lighting operational. Non-slip footwear when patient is off stretcher. Physically safe environment: no spills, clutter or unnecessary equipment. Stretcher in lowest position, wheels locked, appropriate side rails in place. Provide visual cue, wrist band, yellow gown, etc. Monitor gait and stability. Monitor for mental status changes and reorient to person, place, and time. Review medications for side effects contributing to fall risk. Reinforce activity limits and safety measures with patient and family.

## 2020-06-12 NOTE — ED PROVIDER NOTE - PROGRESS NOTE DETAILS
Father - Saúl [phone] Pt seen by psychiatry who advise involuntary psych admission. - Carlos Madden MD

## 2020-06-12 NOTE — ED ADULT NURSE REASSESSMENT NOTE - NS ED NURSE REASSESS COMMENT FT1
Pt received Benadryl 50mg IV, within minutes pt symptoms of jaw spasm began to resolve, pt calm and resting in stretcher at this time.

## 2020-06-12 NOTE — ED ADULT NURSE NOTE - OBJECTIVE STATEMENT
Pt presents to ED with father for jaw spasm, AXOX3, pt recently discharged from [facility] for "cannabis induced psychosis" per father, pt recently started on haldol, pt reports last cannabis intake 3 days ago, pt presenting with jaw spasm,  and reports difficulty and pain with closing of jaw, no tenderness noted to neck or jaw, full range of motion to neck and jaw. No nausea vomiting or diarrhea reported at home, no fevers or chills, pt denies chest pain, no shortness of breath. Pt denies HI/SI.
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
## 2020-06-12 NOTE — ED PROVIDER NOTE - CLINICAL SUMMARY MEDICAL DECISION MAKING FREE TEXT BOX
23 yo male with pmhx of cannabis induced psychosis p/w difficulty moving jaw and neck. concerning for dystonic reaction 2/2 haldol use. will give benadryl and will reassess. will assess psychiatric need for admission with [facility] cmp tox screen, tsh, urine studies, will reassess

## 2020-06-12 NOTE — BEHAVIORAL HEALTH ASSESSMENT NOTE - SUICIDE PROTECTIVE FACTORS
Presybeterian beliefs/Has future plans/Supportive social network of family or friends/Identifies reasons for living

## 2020-06-12 NOTE — ED PROVIDER NOTE - SHIFT CHANGE DETAILS
Anuradha Ruelas MD - Attending Physician: Pt here w/ marijuana-induced psychosis. 2PC by Psych. Awaiting bed availability for transfer

## 2020-06-12 NOTE — BEHAVIORAL HEALTH ASSESSMENT NOTE - VIOLENCE RISK FACTORS:
Substance abuse/Lack of insight into violence risk/need for treatment/Noncompliance with treatment/Impulsivity
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
## 2020-06-12 NOTE — ED PROVIDER NOTE - ENMT, MLM
Airway patent, Nasal mucosa clear. Mouth with normal mucosa. Throat has no vesicles, no oropharyngeal exudates and uvula is midline. no lip smacking movements

## 2020-06-12 NOTE — ED PROVIDER NOTE - ATTENDING CONTRIBUTION TO CARE
24M, pmh cannabis induced psychosis, s/p d/c from [facility] ~1 mo ago, presents with difficulty closing mouth, neck stiffness/discomfort, onset earlier today. Pt recentyl rx'd Haldol for acute episode of psychosis a few days ago, took 5 mg yesterday and 2.5 mg today. Today suddently developed sx. He states last smoked marijuana a few days ago, denies any other drug use. He however denies dakign Haldol (though father insists he did). Denies etoh use. Denies SI/HI, current hallucinations. Denies f/c, n/v/d, cough, congestion, or other complaints.    PE: Mod distress, Neck deviated to L and mouth open (though can close with assistance), NCAT, MMM, Trachea midline, Normal conjunctiva, lungs CTAB, S1/S2 RRR, Normal perfusion, 2+ radial pulses bilat, Abdomen Soft, NTND, No rebound/guarding, No LE edema, No deformity of extremities, No rashes,  No focal motor or sensory deficits.     Sx very c/w acute dystonic reaction from antipsychotic. Give benadryl. Pt's behavior howevr is concerning, he failed outpatietn therapy with haldol and family is concerned about recurrence of psychosis so will consult psychiatry for recommendations. Check psych clearance labs. Appreciate psych recs. - Carlos Madden MD

## 2020-06-12 NOTE — ED PROVIDER NOTE - OBJECTIVE STATEMENT
25 yo male with pmhx of cannabis induced psychosis, recently admitted to [facility] for psychosis 1 mo ago, presenting with difficulty closing mouth and neck discomfort. Patient was recently seen by NP in office, where he was prescribed haldol 2 days ago where he took 2 doses of it. 1 hours PTA, patient endorsed having mouth opening with inability to close mouth, as well as neck extension. denies having prior occurrence, denies having haldol previously, came to ED for evaluation. Recently smoked marijuana 3 days ago, states he smokes "a lot". Was previously admitted to psych for aggressive behavior 2/2 cannibis use.    Denies SI/HI, LOC, A/V hallucinations, N/V, SOB
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
## 2020-06-12 NOTE — BEHAVIORAL HEALTH ASSESSMENT NOTE - NSBHCHARTREVIEWLAB_PSY_A_CORE FT
16.5   7.23  )-----------( 193      ( 12 Jun 2020 12:39 )             50.9   06-12    137  |  100  |  16  ----------------------------<  127<H>  4.3   |  23  |  0.91    Ca    9.4      12 Jun 2020 12:39    TPro  8.3  /  Alb  5.3<H>  /  TBili  0.5  /  DBili  x   /  AST  37  /  ALT  59<H>  /  AlkPhos  65  06-12

## 2020-06-12 NOTE — BEHAVIORAL HEALTH ASSESSMENT NOTE - NSBHCHARTREVIEWVS_PSY_A_CORE FT
Vital Signs Last 24 Hrs  T(C): 36.9 (12 Jun 2020 14:45), Max: 36.9 (12 Jun 2020 14:45)  T(F): 98.5 (12 Jun 2020 14:45), Max: 98.5 (12 Jun 2020 14:45)  HR: 75 (12 Jun 2020 14:45) (75 - 116)  BP: 121/73 (12 Jun 2020 14:45) (121/73 - 152/97)  BP(mean): --  RR: 16 (12 Jun 2020 14:45) (16 - 16)  SpO2: 100% (12 Jun 2020 14:45) (99% - 100%)

## 2020-06-12 NOTE — CHART NOTE - NSCHARTNOTEFT_GEN_A_CORE
Patient referred to Social Work by Psychiatry MD due to patient requiring an inpatient psych admission. Chart reviewed. Patient is a 24 year old speaking single female presented to the emergency room for delusions and inability to close his mouth. Per psychiatry patient is diagnosed with Schizoaffective Disorder NOS and to be involuntarily admitted for psychiatric admission. LMSW contacted the A.D.N. at [facility] ([phone]) to check for bed availability. LMSW spoke with A.D.N. to confirm bed availability. LMSW faxed requested documentation to [phone] Documentation reviewed. Patient offered bed. Accepting MD is Dr. Gaines. Patient will have bed at [facility] ([phone]). MARYLOU Bahena, informed to contact to [facility] for RN Signoff.   Assigned RN Josef, contacted [facility] ([phone]) to arrange transportation for patient. Transfer Packet prepared including Facesheet, Behavioral Assessment and EKG placed in envelope to travel with patient.  Patient has [employer] Out of State ([phone]) insurance benefit.  LMSW attempted to obtain authorization and the office was closed; LMSW will sign out to incoming colleague for follow up.   Patient has declined to identify a primary caregiver at this time.   Telepsych email sent out as per protocol.   LMSW contacted the patient's mother ([phone]) to provide the patient's discharge location. Social Work will continue to remain available and collaborate with interdisciplinary team.

## 2020-06-12 NOTE — BEHAVIORAL HEALTH ASSESSMENT NOTE - DETAILS
none see HPI Mother reports increase in impulsivity and sexuality when on Abilify acute dystonic reaction now resolved after Benadryl

## 2020-06-12 NOTE — ED PROVIDER NOTE - MUSCULOSKELETAL, MLM
mild TTP of jaw BL, can close jaw passively, but remains open actively. mild TTP of jaw BL, can close jaw passively, but remains open at baseline, neck extended, ROM intact in neck w/o pain or resistance

## 2020-06-13 VITALS — TEMPERATURE: 97 F | SYSTOLIC BLOOD PRESSURE: 107 MMHG | DIASTOLIC BLOOD PRESSURE: 60 MMHG | HEART RATE: 76 BPM

## 2020-06-13 LAB
CHOLEST SERPL-MCNC: 180 MG/DL — SIGNIFICANT CHANGE UP (ref 120–199)
HBA1C BLD-MCNC: 5.2 % — SIGNIFICANT CHANGE UP (ref 4–5.6)
HDLC SERPL-MCNC: 41 MG/DL — SIGNIFICANT CHANGE UP (ref 35–55)
LIPID PNL WITH DIRECT LDL SERPL: 139 MG/DL — SIGNIFICANT CHANGE UP
TRIGL SERPL-MCNC: 92 MG/DL — SIGNIFICANT CHANGE UP (ref 10–149)

## 2020-06-13 PROCEDURE — 99222 1ST HOSP IP/OBS MODERATE 55: CPT

## 2020-06-13 RX ORDER — OLANZAPINE 15 MG/1
5 TABLET, FILM COATED ORAL EVERY 6 HOURS
Refills: 0 | Status: DISCONTINUED | OUTPATIENT
Start: 2020-06-13 | End: 2020-06-29

## 2020-06-13 NOTE — CHART NOTE - NSCHARTNOTEFT_GEN_A_CORE
Screening Medical Evaluation  Patient Admitted from: [facility] ER    [facility] admitting diagnosis: Non-organic psychosis    PAST MEDICAL & SURGICAL HISTORY:  No pertinent past medical history  Psychosis  No pertinent past medical history  No significant past surgical history        Allergies    No Known Allergies    Intolerances        Social History:     FAMILY HISTORY:  No pertinent family history in first degree relatives      MEDICATIONS  (STANDING):  benztropine 1 milliGRAM(s) Oral two times a day  risperiDONE   Tablet 1 milliGRAM(s) Oral two times a day    MEDICATIONS  (PRN):  LORazepam     Tablet 2 milliGRAM(s) Oral every 6 hours PRN agitation  LORazepam   Injectable 2 milliGRAM(s) IntraMuscular once PRN agitation  nicotine  Polacrilex Gum 2 milliGRAM(s) Oral every 2 hours PRN nicotine withdrawal  OLANZapine 5 milliGRAM(s) Oral every 6 hours PRN agitation  OLANZapine Injectable 5 milliGRAM(s) IntraMuscular once PRN agitation      Vital Signs Last 24 Hrs  T(C): 36.3 (2020 06:45), Max: 36.9 (2020 14:45)  T(F): 97.4 (2020 06:45), Max: 98.5 (2020 14:45)  HR: 76 (2020 06:45) (74 - 116)  BP: 107/60 (2020 06:45) (107/60 - 152/97)  BP(mean): --  RR: 16 (2020 19:46) (16 - 16)  SpO2: 100% (2020 19:46) (99% - 100%)  CAPILLARY BLOOD GLUCOSE            PHYSICAL EXAM:  GENERAL: NAD, well-developed  HEAD:  Atraumatic, Normocephalic  EYES: EOMI, PERRLA, conjunctiva and sclera clear  NECK: Supple, No JVD  CHEST/LUNG: Clear to auscultation bilaterally; No wheeze  HEART: Regular rate and rhythm; No murmurs, rubs, or gallops  ABDOMEN: Soft, Nontender, Nondistended; Bowel sounds present  EXTREMITIES:  2+ Peripheral Pulses, No clubbing, cyanosis, or edema  PSYCH: AAOx3  NEUROLOGY: non-focal  SKIN: In tact    LABS:                        16.5   7.23  )-----------( 193      ( 2020 12:39 )             50.9     06-12    137  |  100  |  16  ----------------------------<  127<H>  4.3   |  23  |  0.91    Ca    9.4      2020 12:39    TPro  8.3  /  Alb  5.3<H>  /  TBili  0.5  /  DBili  x   /  AST  37  /  ALT  59<H>  /  AlkPhos  65  06-12          Urinalysis Basic - ( 2020 14:54 )    Color: Light Yellow / Appearance: Slightly Turbid / S.018 / pH: x  Gluc: x / Ketone: Negative  / Bili: Negative / Urobili: Negative   Blood: x / Protein: Negative / Nitrite: Negative   Leuk Esterase: Negative / RBC: 1 /hpf / WBC 0 /HPF   Sq Epi: x / Non Sq Epi: 0 /hpf / Bacteria: Negative        RADIOLOGY & ADDITIONAL TESTS:    Assessment and Plan: 24 yo M with no significant PMH is admitted to [facility] with a primary psychiatric diagnosis of Non-organic psychosis. The pt currently denies having any medical complaints such as chest pain, sob, abdominal pain, n/v/d/c, or any problems with urination or bowel movements. The rest of his screening physical is unremarkable.    1.Non-organic psychosis-Plan: continue with meds as per primary psychiatric team

## 2020-06-14 PROCEDURE — 99231 SBSQ HOSP IP/OBS SF/LOW 25: CPT

## 2020-06-15 PROCEDURE — 99232 SBSQ HOSP IP/OBS MODERATE 35: CPT

## 2020-06-16 PROCEDURE — 99232 SBSQ HOSP IP/OBS MODERATE 35: CPT

## 2020-06-17 PROCEDURE — 99232 SBSQ HOSP IP/OBS MODERATE 35: CPT

## 2020-06-17 RX ADMIN — RISPERIDONE 1 MILLIGRAM(S): 4 TABLET ORAL at 21:33

## 2020-06-17 RX ADMIN — Medication 1 MILLIGRAM(S): at 21:33

## 2020-06-18 PROCEDURE — 99232 SBSQ HOSP IP/OBS MODERATE 35: CPT

## 2020-06-18 RX ADMIN — Medication 1 MILLIGRAM(S): at 09:03

## 2020-06-18 RX ADMIN — RISPERIDONE 1 MILLIGRAM(S): 4 TABLET ORAL at 22:34

## 2020-06-18 RX ADMIN — Medication 1 MILLIGRAM(S): at 22:34

## 2020-06-18 RX ADMIN — RISPERIDONE 1 MILLIGRAM(S): 4 TABLET ORAL at 09:03

## 2020-06-19 PROCEDURE — 99231 SBSQ HOSP IP/OBS SF/LOW 25: CPT

## 2020-06-19 RX ADMIN — RISPERIDONE 1 MILLIGRAM(S): 4 TABLET ORAL at 09:38

## 2020-06-19 RX ADMIN — RISPERIDONE 1 MILLIGRAM(S): 4 TABLET ORAL at 20:52

## 2020-06-19 RX ADMIN — Medication 1 MILLIGRAM(S): at 20:52

## 2020-06-19 RX ADMIN — Medication 1 MILLIGRAM(S): at 09:38

## 2020-06-20 RX ADMIN — Medication 1 MILLIGRAM(S): at 22:18

## 2020-06-20 RX ADMIN — Medication 1 MILLIGRAM(S): at 11:09

## 2020-06-20 RX ADMIN — RISPERIDONE 1 MILLIGRAM(S): 4 TABLET ORAL at 22:18

## 2020-06-20 RX ADMIN — RISPERIDONE 1 MILLIGRAM(S): 4 TABLET ORAL at 11:09

## 2020-06-21 RX ADMIN — Medication 1 MILLIGRAM(S): at 21:22

## 2020-06-21 RX ADMIN — Medication 1 MILLIGRAM(S): at 09:53

## 2020-06-21 RX ADMIN — RISPERIDONE 1 MILLIGRAM(S): 4 TABLET ORAL at 09:53

## 2020-06-21 RX ADMIN — RISPERIDONE 1 MILLIGRAM(S): 4 TABLET ORAL at 21:22

## 2020-06-22 PROCEDURE — 99231 SBSQ HOSP IP/OBS SF/LOW 25: CPT

## 2020-06-22 RX ORDER — BENZTROPINE MESYLATE 1 MG
1 TABLET ORAL AT BEDTIME
Refills: 0 | Status: COMPLETED | OUTPATIENT
Start: 2020-06-22 | End: 2020-06-22

## 2020-06-22 RX ORDER — RISPERIDONE 4 MG/1
2 TABLET ORAL AT BEDTIME
Refills: 0 | Status: DISCONTINUED | OUTPATIENT
Start: 2020-06-23 | End: 2020-06-29

## 2020-06-22 RX ORDER — RISPERIDONE 4 MG/1
1 TABLET ORAL AT BEDTIME
Refills: 0 | Status: COMPLETED | OUTPATIENT
Start: 2020-06-22 | End: 2020-06-22

## 2020-06-22 RX ORDER — BENZTROPINE MESYLATE 1 MG
2 TABLET ORAL AT BEDTIME
Refills: 0 | Status: DISCONTINUED | OUTPATIENT
Start: 2020-06-23 | End: 2020-06-29

## 2020-06-22 RX ADMIN — RISPERIDONE 1 MILLIGRAM(S): 4 TABLET ORAL at 22:38

## 2020-06-22 RX ADMIN — Medication 1 MILLIGRAM(S): at 08:52

## 2020-06-22 RX ADMIN — Medication 1 MILLIGRAM(S): at 22:38

## 2020-06-22 RX ADMIN — RISPERIDONE 1 MILLIGRAM(S): 4 TABLET ORAL at 08:52

## 2020-06-23 PROCEDURE — 99232 SBSQ HOSP IP/OBS MODERATE 35: CPT

## 2020-06-23 RX ADMIN — RISPERIDONE 2 MILLIGRAM(S): 4 TABLET ORAL at 21:48

## 2020-06-23 RX ADMIN — Medication 2 MILLIGRAM(S): at 21:48

## 2020-06-24 PROCEDURE — 99231 SBSQ HOSP IP/OBS SF/LOW 25: CPT

## 2020-06-24 RX ADMIN — Medication 2 MILLIGRAM(S): at 22:38

## 2020-06-24 RX ADMIN — RISPERIDONE 2 MILLIGRAM(S): 4 TABLET ORAL at 22:38

## 2020-06-25 PROCEDURE — 99231 SBSQ HOSP IP/OBS SF/LOW 25: CPT

## 2020-06-25 RX ADMIN — RISPERIDONE 2 MILLIGRAM(S): 4 TABLET ORAL at 21:17

## 2020-06-25 RX ADMIN — Medication 2 MILLIGRAM(S): at 21:17

## 2020-06-26 PROCEDURE — 99231 SBSQ HOSP IP/OBS SF/LOW 25: CPT

## 2020-06-26 RX ORDER — RISPERIDONE 4 MG/1
1 TABLET ORAL
Qty: 30 | Refills: 0
Start: 2020-06-26 | End: 2020-07-25

## 2020-06-26 RX ORDER — BENZTROPINE MESYLATE 1 MG
1 TABLET ORAL
Qty: 30 | Refills: 0
Start: 2020-06-26 | End: 2020-07-25

## 2020-06-26 RX ADMIN — RISPERIDONE 2 MILLIGRAM(S): 4 TABLET ORAL at 21:13

## 2020-06-26 RX ADMIN — Medication 2 MILLIGRAM(S): at 21:13

## 2020-06-27 RX ADMIN — Medication 2 MILLIGRAM(S): at 20:38

## 2020-06-27 RX ADMIN — RISPERIDONE 2 MILLIGRAM(S): 4 TABLET ORAL at 20:38

## 2020-06-28 RX ADMIN — RISPERIDONE 2 MILLIGRAM(S): 4 TABLET ORAL at 20:57

## 2020-06-28 RX ADMIN — Medication 2 MILLIGRAM(S): at 20:57

## 2020-06-29 VITALS — RESPIRATION RATE: 19 BRPM | TEMPERATURE: 97 F

## 2020-06-29 PROCEDURE — 99238 HOSP IP/OBS DSCHRG MGMT 30/<: CPT

## 2020-11-24 NOTE — ED ADULT NURSE NOTE - NS ED NURSE REPORT GIVEN TO FT
FOCUS NOTE - UROLOGY  Patient: Dk Lo Date: 2020   : 1951 Attending: Kendra Camp DO   69 year old male      CC: routine catheter change    Interval Events:  Chart reviewed  Patient is able to have routine catheter change by bedside RN staff      Vital Last Value 24 Hour Range   Temperature 98.1 °F (36.7 °C) (20 1246) Temp  Min: 97.1 °F (36.2 °C)  Max: 98.4 °F (36.9 °C)   Pulse 66 (20 1246) Pulse  Min: 66  Max: 76   Respiratory 16 (20 124) Resp  Min: 16  Max: 18   Non-Invasive  Blood Pressure 120/66 (20 1246) BP  Min: 118/64  Max: 133/81   Arterial   Blood Pressure   No data recorded   Pulse Oximetry 96 % (20) SpO2  Min: 96 %  Max: 97 %     Vital Today Admitted   Weight 69.2 kg (20) Weight: 69.2 kg (20)   Height N/A Height: 6' 1\" (185.4 cm) (20)   BMI N/A BMI (Calculated): 20.13 (20)       I/O last 3 completed shifts:  In: -   Out: 1150 [Urine:1150]    LABS:  Recent Labs   Lab 20  0452 20  0457 20  2342   WBC 3.6* 4.1* 4.8   HCT 34.1* 35.2* 35.4*   HGB 11.1* 11.2* 11.3*    205 225   SODIUM 141 140 140   POTASSIUM 3.8 4.1 4.2   CHLORIDE 110* 108* 105   CO2 26 28 31   GLUCOSE 83 78 98   BUN 25* 25* 28*   CREATININE 0.99 0.88 0.98       Lab Results   Component Value Date    PTT 33 (H) 11/10/2020    BAND 1 2020    BNP 45 2016    RAPDTR <0.02 2018    CPK 16 (L) 2018    NH3 21 2019    CRP 18.0 (H) 2020    TSH 1.627 2020       Blood pressure 120/66, pulse 66, temperature 98.1 °F (36.7 °C), temperature source Oral, resp. rate 16, height 6' 1\" (1.854 m), weight 69.2 kg, SpO2 96 %.      Assessment   · Routine catheter change    Plan:  · Orders placed - bedside RN ok to change - next change in 30 days    Discussed with: Shane SALTER, Dr Salguero    Urology will see on request      Giana Gleason NP  [facility]  Office Phone:[phone]  Pager  [phone]     Available Iainsx-Dsqssov-Lgoiryyi-Friday by pager [phone].  Please page [phone] on Wednesday.    Between the hours of [id number] and on weekends, please call the Answering Service.    If you paged me and have not received a callback within 15 minutes, please page [phone].      Consulting Urologist:  Mariano Salguero MD  [phone]     [place]

## 2021-10-21 NOTE — ED ADULT NURSE NOTE - BREATHING, MLM
Writer met with Pt to discuss aftercare options. Pt declined at this time. He is familiar with AA and has several friends who are in it should he chose to go.  Aidee Moreau, TOMMYW  10/21/2021     Spontaneous, unlabored and symmetrical

## 2022-03-04 NOTE — ED BEHAVIORAL HEALTH ASSESSMENT NOTE - DANGER TO SELF; MENTAL ILLNESS EXPECTED TO RESPOND TO INPATIENT CARE
Medications Discontinued During This Encounter   Medication Reason    amiodarone (CORDARONE) 362 mg tablet DUPLICATE ORDER    levothyroxine (SYNTHROID) 908 mcg tablet DUPLICATE ORDER     Nothing to eat or drink after midnight before the procedure except you must take Eliquis with a sip of water early morning, preferably around 5 AM.     Learning About Cardioversion  What is cardioversion? Cardioversion is a treatment that helps your heart return to a normal rhythm. It treats problems like atrial fibrillation. It is also sometimes used in emergencies. It can correct a fast heartbeat that causes low blood pressure, chest pain, or heart failure. Cardioversion can be done by using an electric current or medicines. What are the types of cardioversion? There are two types:  · The electrical type uses an electric current. The current enters your body through patches on your chest or back. · The chemical type uses medicines. The medicine is usually put into your arm through a tube called an IV. Your doctor may ask you to take medicines before the treatment. These help prevent blood clots. Electrical cardioversion  The electrical procedure is done in a hospital. You will get medicine to help you relax and control the pain. Your doctor will put patches on your chest or back. The patches send an electric current to your heart. This resets your heart rhythm. The electrical part takes about 5 minutes. But you will probably be in the hospital for 1 to 2 hours. You will need to recover from the effects of the sedative medicine. Chemical cardioversion  The chemical procedure is most often done in a hospital. In most cases, the medicine is put into your arm through a tube called an IV. But you may get medicines to take by mouth. You may feel a quick sting or pinch when the IV starts. The procedure usually takes about 4 to 8 hours. What can you expect after cardioversion? · You can usually go home the same day.  You will need someone to drive you home. · Your doctor may have you take medicines daily. These help your heart beat normally and prevent blood clots. · After electrical cardioversion, you may have redness where the patches were. This looks and feels like a sunburn. · Abnormal heart rhythms sometimes come back after cardioversion. Follow-up care is a key part of your treatment and safety. Be sure to make and go to all appointments, and call your doctor if you are having problems. It's also a good idea to know your test results and keep a list of the medicines you take. Where can you learn more? Go to http://www.gray.com/  Enter [id number] in the search box to learn more about \"Learning About Cardioversion. \"  Current as of: April 29, 2021               Content Version: 13.0  © 5276-5178 Healthwise, Incorporated. Care instructions adapted under license by [employer] (which disclaims liability or warranty for this information). If you have questions about a medical condition or this instruction, always ask your healthcare professional. [address] any warranty or liability for your use of this information. Unable to care for self

## 2022-10-06 NOTE — ED BEHAVIORAL HEALTH ASSESSMENT NOTE - ORIENTED TO PLACE
Physical Therapy Daily Progress Note        Subjective     Noemi [place] reports: Still have stiffness in neck, mor clarice pain. I also still get dizzy with sitting up from supine. I see Nancy for vertigo again  tomorrow.    Objective   See Exercise, Manual, and Modality Logs for complete treatment.       Assessment/Plan  Improved cervical mobility. Still some trigger points R levator and trap. Ci still shifted slightly to L. Added green Tband shrug/retractions to HEP.    Progress per Plan of Care           Manual Therapy:  30       mins  93581;  Therapeutic Exercise: 5      mins  75685;     Neuromuscular Jared:       mins  89294;    Therapeutic Activity:         mins  86638;     Gait Training:         mins  73065;     Ultrasound:         mins  66159;    Electrical Stimulation:        mins  82393 ( );  Dry Needling         mins self-pay    Timed Treatment:   35   mins   Total Treatment:     50   mins    Jolynn Jauregui, PT  Physical Therapist  [place] License # [id number]  
Yes

## 2022-10-31 ENCOUNTER — INPATIENT (INPATIENT)
Facility: HOSPITAL | Age: 27
LOS: 49 days | Discharge: HOME | End: 2022-12-20
Attending: PSYCHIATRY & NEUROLOGY | Admitting: PSYCHIATRY & NEUROLOGY
Payer: COMMERCIAL

## 2022-10-31 VITALS — HEIGHT: 68 IN

## 2022-10-31 DIAGNOSIS — F29 UNSPECIFIED PSYCHOSIS NOT DUE TO A SUBSTANCE OR KNOWN PHYSIOLOGICAL CONDITION: ICD-10-CM

## 2022-10-31 LAB
ALBUMIN SERPL ELPH-MCNC: 5.1 G/DL — HIGH (ref 3.3–5)
ALP SERPL-CCNC: 57 U/L — SIGNIFICANT CHANGE UP (ref 40–120)
ALT FLD-CCNC: 19 U/L — SIGNIFICANT CHANGE UP (ref 4–41)
ANION GAP SERPL CALC-SCNC: 12 MMOL/L — SIGNIFICANT CHANGE UP (ref 7–14)
APAP SERPL-MCNC: <10 UG/ML — LOW (ref 15–25)
AST SERPL-CCNC: 25 U/L — SIGNIFICANT CHANGE UP (ref 4–40)
BASOPHILS # BLD AUTO: 0.02 K/UL — SIGNIFICANT CHANGE UP (ref 0–0.2)
BASOPHILS NFR BLD AUTO: 0.2 % — SIGNIFICANT CHANGE UP (ref 0–2)
BILIRUB SERPL-MCNC: 0.4 MG/DL — SIGNIFICANT CHANGE UP (ref 0.2–1.2)
BUN SERPL-MCNC: 15 MG/DL — SIGNIFICANT CHANGE UP (ref 7–23)
CALCIUM SERPL-MCNC: 9.5 MG/DL — SIGNIFICANT CHANGE UP (ref 8.4–10.5)
CHLORIDE SERPL-SCNC: 102 MMOL/L — SIGNIFICANT CHANGE UP (ref 98–107)
CO2 SERPL-SCNC: 25 MMOL/L — SIGNIFICANT CHANGE UP (ref 22–31)
CREAT SERPL-MCNC: 0.93 MG/DL — SIGNIFICANT CHANGE UP (ref 0.5–1.3)
EGFR: 117 ML/MIN/1.73M2 — SIGNIFICANT CHANGE UP
EOSINOPHIL # BLD AUTO: 0 K/UL — SIGNIFICANT CHANGE UP (ref 0–0.5)
EOSINOPHIL NFR BLD AUTO: 0 % — SIGNIFICANT CHANGE UP (ref 0–6)
ETHANOL SERPL-MCNC: <10 MG/DL — SIGNIFICANT CHANGE UP
FLUAV AG NPH QL: SIGNIFICANT CHANGE UP
FLUBV AG NPH QL: SIGNIFICANT CHANGE UP
GLUCOSE SERPL-MCNC: 142 MG/DL — HIGH (ref 70–99)
HCT VFR BLD CALC: 45.9 % — SIGNIFICANT CHANGE UP (ref 39–50)
HGB BLD-MCNC: 15.2 G/DL — SIGNIFICANT CHANGE UP (ref 13–17)
IANC: 7.04 K/UL — SIGNIFICANT CHANGE UP (ref 1.8–7.4)
IMM GRANULOCYTES NFR BLD AUTO: 0.2 % — SIGNIFICANT CHANGE UP (ref 0–0.9)
LYMPHOCYTES # BLD AUTO: 0.84 K/UL — LOW (ref 1–3.3)
LYMPHOCYTES # BLD AUTO: 10 % — LOW (ref 13–44)
MCHC RBC-ENTMCNC: 27.6 PG — SIGNIFICANT CHANGE UP (ref 27–34)
MCHC RBC-ENTMCNC: 33.1 GM/DL — SIGNIFICANT CHANGE UP (ref 32–36)
MCV RBC AUTO: 83.5 FL — SIGNIFICANT CHANGE UP (ref 80–100)
MONOCYTES # BLD AUTO: 0.45 K/UL — SIGNIFICANT CHANGE UP (ref 0–0.9)
MONOCYTES NFR BLD AUTO: 5.4 % — SIGNIFICANT CHANGE UP (ref 2–14)
NEUTROPHILS # BLD AUTO: 7.04 K/UL — SIGNIFICANT CHANGE UP (ref 1.8–7.4)
NEUTROPHILS NFR BLD AUTO: 84.2 % — HIGH (ref 43–77)
NRBC # BLD: 0 /100 WBCS — SIGNIFICANT CHANGE UP (ref 0–0)
NRBC # FLD: 0 K/UL — SIGNIFICANT CHANGE UP (ref 0–0)
PLATELET # BLD AUTO: 194 K/UL — SIGNIFICANT CHANGE UP (ref 150–400)
POTASSIUM SERPL-MCNC: 3.8 MMOL/L — SIGNIFICANT CHANGE UP (ref 3.5–5.3)
POTASSIUM SERPL-SCNC: 3.8 MMOL/L — SIGNIFICANT CHANGE UP (ref 3.5–5.3)
PROT SERPL-MCNC: 7.6 G/DL — SIGNIFICANT CHANGE UP (ref 6–8.3)
RBC # BLD: 5.5 M/UL — SIGNIFICANT CHANGE UP (ref 4.2–5.8)
RBC # FLD: 12.4 % — SIGNIFICANT CHANGE UP (ref 10.3–14.5)
RSV RNA NPH QL NAA+NON-PROBE: SIGNIFICANT CHANGE UP
SALICYLATES SERPL-MCNC: <0.3 MG/DL — LOW (ref 15–30)
SARS-COV-2 RNA SPEC QL NAA+PROBE: SIGNIFICANT CHANGE UP
SODIUM SERPL-SCNC: 139 MMOL/L — SIGNIFICANT CHANGE UP (ref 135–145)
TOXICOLOGY SCREEN, DRUGS OF ABUSE, SERUM RESULT: SIGNIFICANT CHANGE UP
TSH SERPL-MCNC: 1.1 UIU/ML — SIGNIFICANT CHANGE UP (ref 0.27–4.2)
WBC # BLD: 8.37 K/UL — SIGNIFICANT CHANGE UP (ref 3.8–10.5)
WBC # FLD AUTO: 8.37 K/UL — SIGNIFICANT CHANGE UP (ref 3.8–10.5)

## 2022-10-31 PROCEDURE — 99285 EMERGENCY DEPT VISIT HI MDM: CPT

## 2022-10-31 PROCEDURE — 93010 ELECTROCARDIOGRAM REPORT: CPT

## 2022-10-31 RX ORDER — OLANZAPINE 15 MG/1
5 TABLET, FILM COATED ORAL EVERY 6 HOURS
Refills: 0 | Status: DISCONTINUED | OUTPATIENT
Start: 2022-11-01 | End: 2022-12-20

## 2022-10-31 RX ORDER — RISPERIDONE 4 MG/1
2 TABLET ORAL AT BEDTIME
Refills: 0 | Status: DISCONTINUED | OUTPATIENT
Start: 2022-11-01 | End: 2022-11-01

## 2022-10-31 RX ORDER — OLANZAPINE 15 MG/1
5 TABLET, FILM COATED ORAL ONCE
Refills: 0 | Status: DISCONTINUED | OUTPATIENT
Start: 2022-11-01 | End: 2022-12-20
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
## 2022-10-31 NOTE — ED BEHAVIORAL HEALTH ASSESSMENT NOTE - NSBHSACONSEQUENCE_PSY_A_CORE FT
psychosis and agitation, and violence and r against others. psychosis and agitation, and violence and against others.

## 2022-10-31 NOTE — ED BEHAVIORAL HEALTH ASSESSMENT NOTE - VIOLENCE RISK FACTORS:
Substance abuse/Impulsivity/Lack of insight into violence risk/need for treatment/Noncompliance with treatment Feeling of being under threat and being unable to control threat/Violent ideation/threat/speech/Substance abuse/Impulsivity/Lack of insight into violence risk/need for treatment/Noncompliance with treatment

## 2022-10-31 NOTE — ED BEHAVIORAL HEALTH ASSESSMENT NOTE - NSSUICPROTFACT_PSY_ALL_CORE
Cultural, spiritual and/or moral attitudes against suicide Supportive social network of family or friends/Cultural, spiritual and/or moral attitudes against suicide
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
## 2022-10-31 NOTE — ED BEHAVIORAL HEALTH ASSESSMENT NOTE - LEGAL HISTORY
denies denies. no pending legal cases as per [facility] [employer]/ [employer] site
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
## 2022-10-31 NOTE — ED BEHAVIORAL HEALTH NOTE - BEHAVIORAL HEALTH NOTE
COVID Exposure Screen- Patient  1.	*Have you had a COVID-19 test in the last 90 days?  (  ) Yes   ( X ) No   (  ) Unknown- Reason: _____  IF YES PROCEED TO QUESTION #2. IF NO OR UNKNOWN, PLEASE SKIP TO QUESTION #3.  2.	Date of test(s) and result(s): ________  3.	*Have you tested positive for COVID-19 antibodies? (  ) Yes   ( X ) No   (  ) Unknown- Reason: _____  IF YES PROCEED TO QUESTION #4. IF NO or UNKNOWN, PLEASE SKIP TO QUESTION #5.  4.	Date of positive antibody test: ________  5.	*Have you received 2 doses of the COVID-19 vaccine? (  ) Yes   ( X ) No   (  ) Unknown- Reason: _____   IF YES PROCEED TO QUESTION #6. IF NO or UNKNOWN, PLEASE SKIP TO QUESTION #7.  6.	Date of second dose: ________  7.	*In the past 10 days, have you been around anyone with a positive COVID-19 test?* (  ) Yes   ( X ) No   (  ) Unknown- Reason: ____  IF YES PROCEED TO QUESTION #8. IF NO or UNKNOWN, PLEASE SKIP TO QUESTION #13.  8.	Were you within 6 feet of them for at least 15 minutes? (  ) Yes   (  ) No   (  ) Unknown- Reason: _____  9.	Have you provided care for them? (  ) Yes   (  ) No   (  ) Unknown- Reason: ______  10.	Have you had direct physical contact with them (touched, hugged, or kissed them)? (  ) Yes   (  ) No    (  ) Unknown- Reason: _____  11.	Have you shared eating or drinking utensils with them? (  ) Yes   (  ) No    (  ) Unknown- Reason: ____  12.	Have they sneezed, coughed, or somehow gotten respiratory droplets on you? (  ) Yes   (  ) No    (  ) Unknown- Reason: ______  13.	*Have you been out of [place] within the past 10 days?* (  ) Yes   ( X ) No   (  ) Unknown- Reason: _____  IF YES PLEASE ANSWER THE FOLLOWING QUESTIONS:  14.	Which state/country have you been to? ______  15.	Were you there over 24 hours? (  ) Yes   (  ) No    (  ) Unknown- Reason: ______  16.	Date of return to [facility]: ______

## 2022-10-31 NOTE — ED PROVIDER NOTE - PROGRESS NOTE DETAILS
NP Bereczky- no behavioural issues during  stay, labs unremarkable, psych recommendation in-patient psychiatric tx.

## 2022-10-31 NOTE — ED BEHAVIORAL HEALTH ASSESSMENT NOTE - DESCRIPTION
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 Unemployed and currently not enrolled in school. Patient is bizarre, superficially cooperative and labile during interview.  Delusional in  thought content, Latter day preoccupied, grandiose and refers to self as jenine. .   Vital Signs Last 24 Hrs  T(C): 36.6 (31 Oct 2022 21:21), Max: 36.6 (31 Oct 2022 21:21)  T(F): 97.9 (31 Oct 2022 21:21), Max: 97.9 (31 Oct 2022 21:21)  HR: 98 (31 Oct 2022 21:21) (98 - 98)  BP: 114/69 (31 Oct 2022 21:21) (114/69 - 114/69)  BP(mean): --  RR: 16 (31 Oct 2022 21:21) (16 - 16)  SpO2: 100% (31 Oct 2022 21:21) (100% - 100%)    Parameters below as of 31 Oct 2022 21:21  Patient On (Oxygen Delivery Method): room air Patient is bizarre, superficially cooperative and labile during interview.  Delusional: religiously preoccupied, grandiose and refers to self as jennie. . no SI or HI. is not delirious. no acutely intoxicated. no PRN meds.      Vital Signs Last 24 Hrs  T(C): 36.6 (31 Oct 2022 21:21), Max: 36.6 (31 Oct 2022 21:21)  T(F): 97.9 (31 Oct 2022 21:21), Max: 97.9 (31 Oct 2022 21:21)  HR: 98 (31 Oct 2022 21:21) (98 - 98)  BP: 114/69 (31 Oct 2022 21:21) (114/69 - 114/69)  BP(mean): --  RR: 16 (31 Oct 2022 21:21) (16 - 16)  SpO2: 100% (31 Oct 2022 21:21) (100% - 100%)    Parameters below as of 31 Oct 2022 21:21  Patient On (Oxygen Delivery Method): room air

## 2022-10-31 NOTE — ED BEHAVIORAL HEALTH ASSESSMENT NOTE - DETAILS
informed father SAMRA none, patient states that he loves himself. see HPI Mother reports increase in impulsivity and sexuality when on Abilify SAMRA: Dr MARTIN Richardson informed father. discussed reccs with DIRK Arroyo
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
## 2022-10-31 NOTE — ED BEHAVIORAL HEALTH ASSESSMENT NOTE - RISK ASSESSMENT
pt is at acutely elevated risk of harm to self and others. Unable to make logical and safe decisions Low Acute Suicide Risk At this time given all risks taken into consideration, the Pt is Not at imminent risk for self harm/ suicide but does remain at chronically elevated risk of harming self.  Pt's current presentation is not be deemed dischargeable back to the community.  Current increased in risks can be mitigated by a psychiatric admission with supervision, reinitiation of psych meds with titration towards efficacious dosing range

## 2022-10-31 NOTE — ED BEHAVIORAL HEALTH ASSESSMENT NOTE - PSYCHIATRIC ISSUES AND PLAN (INCLUDE STANDING AND PRN MEDICATION)
Defer standing medication to inpatient team; use Ativan 2 mg PO/IM prn agitation start Risperdal at 2mg HS. titrate as needed. consider switching to HEALY form.  PRN: zyprexa 5mg PO/SL/IM q6hrs for psychotic agitation

## 2022-10-31 NOTE — ED BEHAVIORAL HEALTH ASSESSMENT NOTE - NS ED BHA HOMICIDALITY PRESENT AGGRESSION PROPERTY PAST MONTH
"Chief Complaint   Patient presents with     Pre-Op Exam     Cataract       Initial /84  Pulse 67  Temp 97.9  F (36.6  C) (Tympanic)  Resp 14  Ht 5' 7\" (1.702 m)  Wt 141 lb (64 kg)  SpO2 98%  BMI 22.08 kg/m2 Estimated body mass index is 22.08 kg/(m^2) as calculated from the following:    Height as of this encounter: 5' 7\" (1.702 m).    Weight as of this encounter: 141 lb (64 kg).  Medication Reconciliation: complete    Health Maintenance that is potentially due pending provider review:  NONE    n/a    Is there anyone who you would like to be able to receive your results? No  If yes have patient fill out TRUPTI      " Yes
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
## 2022-10-31 NOTE — ED BEHAVIORAL HEALTH ASSESSMENT NOTE - THOUGHT PROCESS
Tangential/Perseverative/Illogical/Impaired reasoning/Other/Disorganized Tangential/Perseverative/Flight of ideas/Illogical/Impaired reasoning/Disorganized

## 2022-10-31 NOTE — ED BEHAVIORAL HEALTH ASSESSMENT NOTE - DIFFERENTIAL
Unspecified psychosis  substance induced psychosis   schizoaffective disorder Unspecified psychosis  substance induced psychosis   schizoaffective disorder, bipolar type vs bipolar disorder MRS sujey with psychotic symptoms
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
## 2022-11-01 DIAGNOSIS — F12.10 CANNABIS ABUSE, UNCOMPLICATED: ICD-10-CM

## 2022-11-01 LAB
A1C WITH ESTIMATED AVERAGE GLUCOSE RESULT: 4.9 % — SIGNIFICANT CHANGE UP (ref 4–5.6)
COVID-19 SPIKE DOMAIN AB INTERP: POSITIVE
COVID-19 SPIKE DOMAIN ANTIBODY RESULT: >250 U/ML — HIGH
ESTIMATED AVERAGE GLUCOSE: 94 — SIGNIFICANT CHANGE UP
SARS-COV-2 IGG+IGM SERPL QL IA: >250 U/ML — HIGH
SARS-COV-2 IGG+IGM SERPL QL IA: POSITIVE

## 2022-11-01 PROCEDURE — 99223 1ST HOSP IP/OBS HIGH 75: CPT

## 2022-11-01 RX ORDER — RISPERIDONE 4 MG/1
2 TABLET ORAL AT BEDTIME
Refills: 0 | Status: DISCONTINUED | OUTPATIENT
Start: 2022-11-01 | End: 2022-11-04

## 2022-11-01 RX ORDER — INFLUENZA VIRUS VACCINE 15; 15; 15; 15 UG/.5ML; UG/.5ML; UG/.5ML; UG/.5ML
0.5 SUSPENSION INTRAMUSCULAR ONCE
Refills: 0 | Status: COMPLETED | OUTPATIENT
Start: 2022-11-01 | End: 2022-11-01

## 2022-11-01 NOTE — BH INPATIENT PSYCHIATRY ASSESSMENT NOTE - DETAILS
Mother reports increase in impulsivity and sexuality when on Abilify none, patient states that he loves himself. see HPI

## 2022-11-01 NOTE — BH INPATIENT PSYCHIATRY ASSESSMENT NOTE - NSBHMETABOLIC_PSY_ALL_CORE_FT
BMI: BMI (kg/m2): 51.8 (11-01-22 @ 06:43)  HbA1c: A1C with Estimated Average Glucose Result: 4.9 % (11-01-22 @ 08:00)    Glucose:   BP: 125/81 (11-01-22 @ 06:04) (114/69 - 125/81)  Lipid Panel:

## 2022-11-01 NOTE — BH INPATIENT PSYCHIATRY ASSESSMENT NOTE - LEGAL HISTORY
denies. no pending legal cases as per [facility] [employer]/ [employer] site

## 2022-11-01 NOTE — BH INPATIENT PSYCHIATRY ASSESSMENT NOTE - NSBHASSESSSUMMFT_PSY_ALL_CORE
26 yr old male, single, unemployed, and domiciled with parents. with hx of psychosis, non adherent to meds and psych aftercare, with multiple previous inpatient admissions.  he has carried various diagnoses including cannabis induced psychotic disorder and schizotypal personality disorder and recent r/o schizophrenia.  Family report a history of becoming more isolative and decreased functioning since his late teenage years.  They also describe onset of Evangelical preoccupations and identifications in those years and that these constituted a difference from the beliefs of his early upbringing.  Differential again includes all three.  He is floridly psychotic and shows some manic symptoms although he is in better control and calm.  He has poor insight and judgment and is dangerous to others.   1. Continue inpatient hospitalization for stabilization and safety.  2.  Will continue to offer risperidone 2mg at bedtime but will change to oral solution.  3. 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 unclear but will avoid for now.  will give zyprexa im prns

## 2022-11-01 NOTE — ED ADULT NURSE REASSESSMENT NOTE - NS ED NURSE REASSESS COMMENT FT1
RN Rounding Note 12:30am- Pt received at 12AM shift change, pt presently sleeping with even and non labored respirations; pt awaiting pickup by EMS for xfet to Inpatient psychiatric unit.

## 2022-11-01 NOTE — BH INPATIENT PSYCHIATRY ASSESSMENT NOTE - OTHER
"good" superficially cooperative, minimized but is floridly religiously preoccupied and identifies himself as god he mentions from time to time that he hears Buddhist content/figures somewhat irritable, becomes brighter when I let him talk about his grandiose thoughts, becomes irritable around talking about family andreanging

## 2022-11-01 NOTE — BH PATIENT PROFILE - FALL HARM RISK - UNIVERSAL INTERVENTIONS
Bed in lowest position, wheels locked, appropriate side rails in place/Call bell, personal items and telephone in reach/Instruct patient to call for assistance before getting out of bed or chair/Non-slip footwear when patient is out of bed/[place] to call system/Physically safe environment - no spills, clutter or unnecessary equipment/Purposeful Proactive Rounding/Room/bathroom lighting operational, light cord in reach
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
## 2022-11-01 NOTE — BH INPATIENT PSYCHIATRY ASSESSMENT NOTE - CURRENT MEDICATION
MEDICATIONS  (STANDING):  influenza   Vaccine 0.5 milliLiter(s) IntraMuscular once  risperiDONE   Solution 2 milliGRAM(s) Oral at bedtime    MEDICATIONS  (PRN):  LORazepam     Tablet 2 milliGRAM(s) Oral four times a day PRN severe anxiety related to psychosis  OLANZapine 5 milliGRAM(s) Oral every 6 hours PRN agitation  OLANZapine Injectable 5 milliGRAM(s) IntraMuscular Once PRN severe psychotic agitation

## 2022-11-01 NOTE — BH PATIENT PROFILE - PRIMARY ROLES/RESPONSIBILITIES
Post-Care Instructions: I reviewed with the patient in detail post-care instructions. Patient is to wear sunprotection, and avoid picking at any of the treated lesions. Pt may apply Vaseline to crusted or scabbing areas.
Render Post-Care Instructions In Note?: no
Detail Level: Detailed
Duration Of Freeze Thaw-Cycle (Seconds): 0
Consent: The patient's consent was obtained including but not limited to risks of crusting, scabbing, blistering, scarring, darker or lighter pigmentary change, recurrence, incomplete removal and infection.
none

## 2022-11-01 NOTE — BH INPATIENT PSYCHIATRY ASSESSMENT NOTE - RISK ASSESSMENT
At this time given all risks taken into consideration, the Pt is Not at imminent risk for self harm/ suicide but does remain at chronically elevated risk of harming self.  Pt's current presentation is not be deemed dischargeable back to the community.  Current increased in risks can be mitigated by a psychiatric admission with supervision, reinitiation of psych meds with titration towards efficacious dosing range At this time given all risks taken into consideration, the Pt is Not at imminent risk for self harm/ suicide but does remain at chronically elevated risk of harming self.    He is increased risk of harm to others.  Pt's current presentation is not be deemed dischargeable back to the community.  Current increased in risks can be mitigated by a psychiatric admission with supervision, reinitiation of psych meds with titration towards efficacious dosing range

## 2022-11-01 NOTE — BH INPATIENT PSYCHIATRY ASSESSMENT NOTE - VIOLENCE RISK FACTORS:
Feeling of being under threat and being unable to control threat/Violent ideation/threat/speech/Substance abuse/Impulsivity/Lack of insight into violence risk/need for treatment/Noncompliance with treatment
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
## 2022-11-01 NOTE — PSYCHIATRIC REHAB INITIAL EVALUATION - NSBHPRRECOMMEND_PSY_ALL_CORE
Writer attempted to meet with patient to orient to unit and introduce self, psychiatric rehabilitation staff and department functions, however pt was meeting with another member of the treatment team and therefore, writer unable to meet with pt today. Writer will however meet with the patient at another convenient time to orient patient to the unit. Per chart, pt is a 27 year old, single, unemployed male, domiciled with his parents. Pt has a PPHx of psychosis. Pt is nonadherent to medications and outpatient psychiatric care. Pt has a hx of prior psychiatric hospitalizations and was last at [facility] from 12/30/19-1/7/20. During his last hospitalization at [facility] pt was diagnosed with cannabis induced psychosis and schizoaffective disorder. Pt has BIB EMS activated by patient's mother for bizarre and erratic behavior. Pt has no hx of SA. No hx of legal issues. Per chart, pt denies SI/HI/I/P and AH/VH/TH. Will continue to support. Writer will encourage patient to attend psychiatric rehabilitation groups and engage in treatment. Writer will intentionally select a goal for the pt that reflects the pt's current needs.  Psychiatric Rehabilitation staff will continue to engage patient daily in order to develop therapeutic rapport.

## 2022-11-01 NOTE — BH INPATIENT PSYCHIATRY ASSESSMENT NOTE - NSCOMMENTSUICRISKFACT_PSY_ALL_CORE
patient with some risk of harm to self but  more concerning is his potential for harming others including father.

## 2022-11-01 NOTE — BH INPATIENT PSYCHIATRY ASSESSMENT NOTE - DESCRIPTION
Patient lives at home with mom, dad, and brother. Unemployed and currently not enrolled in school. Patient lives at home with mom, dad, and brother. Unemployed.  Was on the verge of graduating college but refused to sit for final of last exam.

## 2022-11-01 NOTE — BH INPATIENT PSYCHIATRY ASSESSMENT NOTE - NSBHCHARTREVIEWVS_PSY_A_CORE FT
Vital Signs Last 24 Hrs  T(C): 36.7 (11-01-22 @ 06:43), Max: 36.7 (11-01-22 @ 06:43)  T(F): 98 (11-01-22 @ 06:43), Max: 98 (11-01-22 @ 06:43)  HR: 95 (11-01-22 @ 06:04) (95 - 98)  BP: 125/81 (11-01-22 @ 06:04) (114/69 - 125/81)  BP(mean): --  RR: 17 (11-01-22 @ 06:04) (16 - 17)  SpO2: 100% (11-01-22 @ 06:04) (100% - 100%)    Orthostatic VS  11-01-22 @ 06:43  Lying BP: --/-- HR: --  Sitting BP: 132/82 HR: 89  Standing BP: 134/80 HR: 82  Site: --  Mode: --

## 2022-11-02 LAB
CHOLEST SERPL-MCNC: 227 MG/DL — HIGH
HDLC SERPL-MCNC: 53 MG/DL — SIGNIFICANT CHANGE UP
LIPID PNL WITH DIRECT LDL SERPL: 162 MG/DL — HIGH
NON HDL CHOLESTEROL: 174 MG/DL — HIGH
TRIGL SERPL-MCNC: 62 MG/DL — SIGNIFICANT CHANGE UP

## 2022-11-02 PROCEDURE — 99232 SBSQ HOSP IP/OBS MODERATE 35: CPT

## 2022-11-02 NOTE — BH INPATIENT PSYCHIATRY PROGRESS NOTE - NSBHASSESSSUMMFT_PSY_ALL_CORE
26 yr old male, single, unemployed, and domiciled with parents. with hx of psychosis, non adherent to meds and psych aftercare, with multiple previous inpatient admissions.  he has carried various diagnoses including cannabis induced psychotic disorder and schizotypal personality disorder and recent r/o schizophrenia.  Family report a history of becoming more isolative and decreased functioning since his late teenage years.  They also describe onset of Mandaeism preoccupations and identifications in those years and that these constituted a difference from the beliefs of his early upbringing.  Differential again includes all three.  He is floridly psychotic.  He has poor insight and judgment and is dangerous to others.   He is refusing treatment.  We consider treatment over objection.  1. Continue inpatient hospitalization for stabilization and safety.  2.  Will continue to offer risperidone 2mg at bedtime (oral solution).  3. 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 unclear but will avoid for now.  will give zyprexa im prns
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
## 2022-11-02 NOTE — BH INPATIENT PSYCHIATRY PROGRESS NOTE - OTHER
less irritable, more matter of fact odd he mentions from time to time that he hears Restorationist content/figures klanging less prominent "good"
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
## 2022-11-03 PROCEDURE — 99232 SBSQ HOSP IP/OBS MODERATE 35: CPT

## 2022-11-03 NOTE — BH INPATIENT PSYCHIATRY PROGRESS NOTE - NSBHASSESSSUMMFT_PSY_ALL_CORE
26 yr old male, single, unemployed, and domiciled with parents. with hx of psychosis, non adherent to meds and psych aftercare, with multiple previous inpatient admissions.  he has carried various diagnoses including cannabis induced psychotic disorder and schizotypal personality disorder and recent r/o schizophrenia.  Family report a history of becoming more isolative and decreased functioning since his late teenage years.  They also describe onset of Yazidism preoccupations and identifications in those years and that these constituted a difference from the beliefs of his early upbringing.  Differential again includes all three.  He is floridly psychotic.  He has poor insight and judgment and is dangerous to others.   He is refusing treatment.  We consider treatment over objection.  1. Continue inpatient hospitalization for stabilization and safety.  2.  Will continue to offer risperidone 2mg at bedtime (oral solution).  3. 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 unclear but will avoid for now.  will give zyprexa im prns
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
## 2022-11-03 NOTE — BH INPATIENT PSYCHIATRY PROGRESS NOTE - OTHER
he mentions from time to time that he hears Druze content/figures becomes somewhat intense klanging less prominent odd "good"

## 2022-11-04 PROCEDURE — 99231 SBSQ HOSP IP/OBS SF/LOW 25: CPT

## 2022-11-04 RX ORDER — RISPERIDONE 4 MG/1
1 TABLET ORAL AT BEDTIME
Refills: 0 | Status: ACTIVE | OUTPATIENT
Start: 2022-11-04 | End: 2023-10-03

## 2022-11-04 RX ADMIN — RISPERIDONE 1 MILLIGRAM(S): 4 TABLET ORAL at 20:22
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
## 2022-11-04 NOTE — BH INPATIENT PSYCHIATRY PROGRESS NOTE - OTHER
klanging less prominent "alright" he mentions from time to time that he hears Catholic content/figures odd less intense, less irritable

## 2022-11-04 NOTE — BH INPATIENT PSYCHIATRY PROGRESS NOTE - NSBHASSESSSUMMFT_PSY_ALL_CORE
26 yr old male, single, unemployed, and domiciled with parents. with hx of psychosis, non adherent to meds and psych aftercare, with multiple previous inpatient admissions.  he has carried various diagnoses including cannabis induced psychotic disorder and schizotypal personality disorder and recent r/o schizophrenia.  Family report a history of becoming more isolative and decreased functioning since his late teenage years.  They also describe onset of Oriental orthodox preoccupations and identifications in those years and that these constituted a difference from the beliefs of his early upbringing.  Differential again includes all three.  He is floridly psychotic.  He has poor insight and judgment and is dangerous to others.  We discussed the possibility of treatment over objection because he was noncompliant.  He agrees to start accepting risperidone one mg at bedtime (first dose 11/4 bedtime)  1. Continue inpatient hospitalization for stabilization and safety.  2.  Will continue to offer risperidone 1mg at bedtime (oral solution).  3. 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 unclear but will avoid for now.  will give zyprexa im prns

## 2022-11-05 PROCEDURE — 99231 SBSQ HOSP IP/OBS SF/LOW 25: CPT

## 2022-11-05 RX ADMIN — RISPERIDONE 1 MILLIGRAM(S): 4 TABLET ORAL at 20:09

## 2022-11-05 NOTE — BH INPATIENT PSYCHIATRY PROGRESS NOTE - NSBHASSESSSUMMFT_PSY_ALL_CORE
26 yr old male, single, unemployed, and domiciled with parents. with hx of psychosis, non adherent to meds and psych aftercare, with multiple previous inpatient admissions.  he has carried various diagnoses including cannabis induced psychotic disorder and schizotypal personality disorder and recent r/o schizophrenia.  Family report a history of becoming more isolative and decreased functioning since his late teenage years.  They also describe onset of Jew preoccupations and identifications in those years and that these constituted a difference from the beliefs of his early upbringing.  Differential again includes all three.  He is floridly psychotic.  He has poor insight and judgment and is dangerous to others.  We discussed the possibility of treatment over objection because he was noncompliant.  He agrees to start accepting risperidone one mg at bedtime (first dose 11/4 bedtime)    on assessment, patient is guarded, paranoid and superficially cooperative, with underlying agitation. Took meds last night.   1. Continue inpatient hospitalization for stabilization and safety.  2.  Will continue to offer risperidone 1mg at bedtime (oral solution).  3. 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 unclear but will avoid for now.  will give zyprexa im prns

## 2022-11-05 NOTE — BH INPATIENT PSYCHIATRY PROGRESS NOTE - NSBHFUPINTERVALHXFT_PSY_A_CORE
f/up schizophrenia, VSS. Patient was guarded on approach, appeared distracted, reported that the police put him here because they had no reason to place him in prison. Patient denied SI/I/P. Patient with internal agitation, but keeping in control. Reported fair sleep and appetite. Patient superficially cooperative, minimizing sx.

## 2022-11-06 PROCEDURE — 99231 SBSQ HOSP IP/OBS SF/LOW 25: CPT

## 2022-11-06 RX ADMIN — RISPERIDONE 1 MILLIGRAM(S): 4 TABLET ORAL at 20:19

## 2022-11-06 NOTE — BH INPATIENT PSYCHIATRY PROGRESS NOTE - NSBHFUPINTERVALHXFT_PSY_A_CORE
f/up schizophrenia, VSS. Patient appeared disheveled, keeping to himself, in room, appeared distracted, with poor eye contact.  Patient denied SI/I/P.  Reported fair sleep and appetite. Patient superficially cooperative, minimizing sx.

## 2022-11-06 NOTE — BH INPATIENT PSYCHIATRY PROGRESS NOTE - NSBHASSESSSUMMFT_PSY_ALL_CORE
26 yr old male, single, unemployed, and domiciled with parents. with hx of psychosis, non adherent to meds and psych aftercare, with multiple previous inpatient admissions.  he has carried various diagnoses including cannabis induced psychotic disorder and schizotypal personality disorder and recent r/o schizophrenia.  Family report a history of becoming more isolative and decreased functioning since his late teenage years.  They also describe onset of Caodaism preoccupations and identifications in those years and that these constituted a difference from the beliefs of his early upbringing.  Differential again includes all three.  He is floridly psychotic.  He has poor insight and judgment and is dangerous to others.  We discussed the possibility of treatment over objection because he was noncompliant.  He agrees to start accepting risperidone one mg at bedtime (first dose 11/4 bedtime)    on assessment, patient is guarded, paranoid and superficially cooperative, with underlying agitation. Took meds last night and resistant to increase.     1. Continue inpatient hospitalization for stabilization and safety.  2.  Will continue to offer risperidone 1mg at bedtime (oral solution).  3. 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 unclear but will avoid for now.  will give zyprexa im prns

## 2022-11-07 RX ADMIN — RISPERIDONE 1 MILLIGRAM(S): 4 TABLET ORAL at 20:42

## 2022-11-07 NOTE — BH INPATIENT PSYCHIATRY PROGRESS NOTE - NSBHFUPINTERVALCCFT_PSY_A_CORE
Reported feeling "good". Says he sleeps well, eats well and his mood is "ok" today. Patient was watching TV with a group of patients prior to interview. Psychosis

## 2022-11-07 NOTE — BH INPATIENT PSYCHIATRY PROGRESS NOTE - NSBHASSESSSUMMFT_PSY_ALL_CORE
26 yr old male, single, unemployed, and domiciled with parents. with hx of psychosis, non adherent to meds and psych aftercare, with multiple previous inpatient admissions.  he has carried various diagnoses including cannabis induced psychotic disorder and schizotypal personality disorder and recent r/o schizophrenia.  Family report a history of becoming more isolative and decreased functioning since his late teenage years.  They also describe onset of Nondenominational preoccupations and identifications in those years and that these constituted a difference from the beliefs of his early upbringing.  Differential again includes all three.  He is floridly psychotic.  He has poor insight and judgment and is dangerous to others.  We discussed the possibility of treatment over objection because he was noncompliant.  He agrees to start accepting risperidone one mg at bedtime (first dose 11/4 bedtime)    on assessment, patient is guarded, paranoid and superficially cooperative, with underlying agitation. Took meds last night and resistant to increase.     Patient is seen today on 11/7/22, agreeable to a conversation after watching TV with a group of patients. Patient reports he is compliant taking his medication, man not agree dose should be increased. Reports everything is "good". Patient is eating ell/sleeping ok.     1. Continue inpatient hospitalization for stabilization and safety.  2.  Will continue to offer risperidone 1mg at bedtime (oral solution).  3. 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 unclear but will avoid for now.  will give zyprexa im prns   26 yr old male, single, unemployed, and domiciled with parents. with hx of psychosis, non adherent to meds and psych aftercare, with multiple previous inpatient admissions.  he has carried various diagnoses including cannabis induced psychotic disorder and schizotypal personality disorder and recent r/o schizophrenia.  Family report a history of becoming more isolative and decreased functioning since his late teenage years.  They also describe onset of Anabaptist preoccupations and identifications in those years and that these constituted a difference from the beliefs of his early upbringing.  Differential again includes all three.  He is floridly psychotic.  He has poor insight and judgment and is dangerous to others.  We discussed the possibility of treatment over objection because he was noncompliant.  He agrees to start accepting risperidone one mg at bedtime (first dose 11/4 bedtime)    on assessment, patient is guarded, paranoid and superficially cooperative, with underlying agitation. Took meds last night and resistant to increase.     Patient is seen today on 11/7/22, agreeable to a conversation after watching TV with a group of patients. Patient reports he is compliant taking his medication, does not agree dose should be increased. Reports everything is "good". Patient is eating ell/sleeping ok.  Superficial, guarded, paranoid.  Poor insight.    1. Continue inpatient hospitalization for stabilization and safety.  2.  Will continue to offer risperidone 1mg at bedtime (oral solution).  3. 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 unclear but will avoid for now.  will give zyprexa im prns

## 2022-11-07 NOTE — BH INPATIENT PSYCHIATRY PROGRESS NOTE - NSBHFUPINTERVALHXFT_PSY_A_CORE
f/up schizophrenia, VSS. Patient appeared disheveled, keeping to himself, in room, appeared distracted, with poor eye contact.  Patient denied SI/I/P.  Reported fair sleep and appetite. Patient superficially cooperative, minimizing sx.  Patient seen for f/up of schizophrenia, VSS. No events reports overnight.  Patient reported to be compliant with medications, denies side effects.  Reported feeling "good". Says he sleeps well, eats well and his mood is "ok" today. No SI.  Behavior well controlled.  Patient was watching TV with a group of patients prior to interview.  Reported fair sleep and appetite. Patient superficially cooperative, minimizing sx. States getting along better with family.  Did not want to discuss behavior prior to admission.

## 2022-11-07 NOTE — BH INPATIENT PSYCHIATRY PROGRESS NOTE - NSBHATTESTCOMMENTATTENDFT_PSY_A_CORE
Patient remains superficial, guarded, underlying irritability continues, and delusions continue to be suspected, although patient not talking about it.  He has been compliant with medications, tolerating it well, but refusing increase in Risperdal.

## 2022-11-08 PROCEDURE — 99231 SBSQ HOSP IP/OBS SF/LOW 25: CPT

## 2022-11-08 RX ORDER — RISPERIDONE 4 MG/1
2 TABLET ORAL AT BEDTIME
Refills: 0 | Status: DISCONTINUED | OUTPATIENT
Start: 2022-11-08 | End: 2022-11-11

## 2022-11-08 RX ADMIN — RISPERIDONE 2 MILLIGRAM(S): 4 TABLET ORAL at 20:05

## 2022-11-08 NOTE — BH INPATIENT PSYCHIATRY PROGRESS NOTE - NSBHFUPINTERVALHXFT_PSY_A_CORE
Staff report  no interval events, compliant with meds, sleeps.  THey relate he socializes with peers, especially a female.  He relates that "I know I cannot touch her,"  Staff report he is less tense and irritable.  he is still delusional.  We are increasing risperidone to 2mg at bedtime.  He denies any side effect complaints.

## 2022-11-08 NOTE — BH INPATIENT PSYCHIATRY PROGRESS NOTE - NSBHASSESSSUMMFT_PSY_ALL_CORE
26 yr old male, single, unemployed, and domiciled with parents. with hx of psychosis, non adherent to meds and psych aftercare, with multiple previous inpatient admissions.  he has carried various diagnoses including cannabis induced psychotic disorder and schizotypal personality disorder and recent r/o schizophrenia.  Family report a history of becoming more isolative and decreased functioning since his late teenage years.  They also describe onset of Zoroastrian preoccupations and identifications in those years and that these constituted a difference from the beliefs of his early upbringing.  Differential again includes all three.  He is floridly psychotic.  He has poor insight and judgment and is dangerous to others.  We discussed the possibility of treatment over objection because he was noncompliant.  He agrees to start accepting risperidone one mg at bedtime (first dose 11/4 bedtime)  He is making some mild progress on risperidone and is denyin gany side effect complaints.  We are increasing risperidone to 2mg at bedtime.  He is dangerous to others.     1. Continue inpatient hospitalization for stabilization and safety.  2.  Increase risperidone to 2mg at bedtime.    3. 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 unclear but will avoid for now.  will give zyprexa im prns

## 2022-11-08 NOTE — BH INPATIENT PSYCHIATRY PROGRESS NOTE - NSBHFUPINTERVALCCFT_PSY_A_CORE
Patient seen, chart reviewed, case discussed with team.  Patient seen for follow up of psychosis and aggression.

## 2022-11-09 PROCEDURE — 99231 SBSQ HOSP IP/OBS SF/LOW 25: CPT

## 2022-11-09 RX ADMIN — RISPERIDONE 2 MILLIGRAM(S): 4 TABLET ORAL at 20:33

## 2022-11-09 NOTE — BH INPATIENT PSYCHIATRY PROGRESS NOTE - NSBHASSESSSUMMFT_PSY_ALL_CORE
26 yr old male, single, unemployed, and domiciled with parents. with hx of psychosis, non adherent to meds and psych aftercare, with multiple previous inpatient admissions.  he has carried various diagnoses including cannabis induced psychotic disorder and schizotypal personality disorder and recent r/o schizophrenia.  Family report a history of becoming more isolative and decreased functioning since his late teenage years.  They also describe onset of Sabianism preoccupations and identifications in those years and that these constituted a difference from the beliefs of his early upbringing.  Differential again includes all three.  He is floridly psychotic.  He has poor insight and judgment and is dangerous to others.  We discussed the possibility of treatment over objection because he was noncompliant.  He started risperidone one mg at bedtime (first dose 11/4 ) titrated to 2mg at bedtime first dose 11/8.  Although he is showing some improvement and tolerating risperidone he is dangerous to others.     1. Continue inpatient hospitalization for stabilization and safety.  2.  risperidone 2mg at bedtime.    3. 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 unclear but will avoid for now.  will give zyprexa im prns

## 2022-11-09 NOTE — BH INPATIENT PSYCHIATRY PROGRESS NOTE - NSTXDCOPNOGOALOTHER_PSY_ALL_CORE
Pt will show a reduction of symptoms and engage meaningfully with writer to identify a safe discharge plan. Pt will comply with recommended tx plan and medications for 5 days, identify 2 benefits for adhering to tx.

## 2022-11-09 NOTE — BH INPATIENT PSYCHIATRY PROGRESS NOTE - NSBHFUPINTERVALHXFT_PSY_A_CORE
Staff report  no interval events, compliant with meds, sleeps.  He is noted to be less irritable, les guarded and with a more appropriate affect by staff.  He reports no changes, denies any side effect complaints or any changes.  He relates feeling no different.  Today is day #2 of risperidone 2mg at bedtime.

## 2022-11-10 PROCEDURE — 99231 SBSQ HOSP IP/OBS SF/LOW 25: CPT

## 2022-11-10 RX ADMIN — RISPERIDONE 2 MILLIGRAM(S): 4 TABLET ORAL at 20:52

## 2022-11-10 NOTE — BH INPATIENT PSYCHIATRY PROGRESS NOTE - OTHER
still somewhat odd odd still guarded, superficial superficially cooperative, minimized guarded less irritable "good"

## 2022-11-10 NOTE — BH INPATIENT PSYCHIATRY PROGRESS NOTE - NSBHFUPINTERVALHXFT_PSY_A_CORE
Staff report  no interval events, compliant with meds, sleeps.  He initially denies any medication side effects.  When we discuss increasing he suddenly remembers that "Last night, before medications, I felt a bit light headed.  I didn't fall or pass out or anything."  We note medication highly unlikely to have caused this reported event as patient was troughing on his meds (this was in the evening almost 24 hours after his last dose).  He also notes he has been walking about for soemtime when he reports this happened, minimizing possibility of orthostatic hypotension.  Given his presentation, we suspect that he is reporting this so as to avoid medicaiton titration as he accepts them grudginly.  We will continue risperidone 2mg at bedtime. S bharathi note a change with him in that he is better related and less irritable, more relaxed.  They note: "He seems more like a young kid and less like an old testament prophet."

## 2022-11-10 NOTE — BH INPATIENT PSYCHIATRY PROGRESS NOTE - NSBHASSESSSUMMFT_PSY_ALL_CORE
26 yr old male, single, unemployed, and domiciled with parents. with hx of psychosis, non adherent to meds and psych aftercare, with multiple previous inpatient admissions.  he has carried various diagnoses including cannabis induced psychotic disorder and schizotypal personality disorder and recent r/o schizophrenia.  Family report a history of becoming more isolative and decreased functioning since his late teenage years.  They also describe onset of Episcopal preoccupations and identifications in those years and that these constituted a difference from the beliefs of his early upbringing.  Differential again includes all three.  He is floridly psychotic.  He has poor insight and judgment and is dangerous to others.  We discussed the possibility of treatment over objection because he was noncompliant.  He started risperidone one mg at bedtime (first dose 11/4 ) titrated to 2mg at bedtime first dose 11/8.  Although he is showing some improvement and tolerating risperidone he is dangerous to others. Will continue to monitor risperidone 2mg at bedtime (dose #3 tonight) to observe for adverse effects.      1. Continue inpatient hospitalization for stabilization and safety.  2.  risperidone 2mg at bedtime.    3. 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 unclear but will avoid for now.  will give zyprexa im prns

## 2022-11-11 PROCEDURE — 99232 SBSQ HOSP IP/OBS MODERATE 35: CPT

## 2022-11-11 RX ORDER — PALIPERIDONE 1.5 MG/1
156 TABLET, EXTENDED RELEASE ORAL
Qty: 1 | Refills: 0
Start: 2022-11-11 | End: 2022-12-10

## 2022-11-11 RX ORDER — RISPERIDONE 4 MG/1
2 TABLET ORAL AT BEDTIME
Refills: 0 | Status: DISCONTINUED | OUTPATIENT
Start: 2022-11-11 | End: 2022-11-15

## 2022-11-11 RX ORDER — PALIPERIDONE 1.5 MG/1
234 TABLET, EXTENDED RELEASE ORAL ONCE
Refills: 0 | Status: DISCONTINUED | OUTPATIENT
Start: 2022-11-11 | End: 2022-11-11

## 2022-11-11 RX ADMIN — RISPERIDONE 2 MILLIGRAM(S): 4 TABLET ORAL at 20:55

## 2022-11-11 NOTE — BH INPATIENT PSYCHIATRY PROGRESS NOTE - NSBHFUPINTERVALHXFT_PSY_A_CORE
Staff report  no interval events, compliant with meds, sleeps.  He reports no side effects.  He refuses titration of risperidone above 2mg but would accept invega sustenna.  I spoke with parents who reproted negative reaction on abilify ERAZO (they describe compulsive behaviors, etc).  We are able to clarify that was a reaction to abilify and not risperidone /invega and that there is nothing about Gm in and of themselves that cause compulsive behaviors ("It was a disaster" relates mother) and this was a problem with aripiprazole.  We are deferring erazo until we have a family meeting/teleconference to clarify everyone's feelings about this and also what disposition plans are in the realm of realistic possibility as parents are afraid of patient.  Erin relates family visits almost daily, they relate that Confucianism content and pronouncements are still present albeit less salient/less intense.  For his part, patient continues to have difficulty understanding how his behavior towards his family is disturbing andhe further insists that relationship with family is improving.

## 2022-11-11 NOTE — BH INPATIENT PSYCHIATRY PROGRESS NOTE - NSBHASSESSSUMMFT_PSY_ALL_CORE
26 yr old male, single, unemployed, and domiciled with parents. with hx of psychosis, non adherent to meds and psych aftercare, with multiple previous inpatient admissions.  he has carried various diagnoses including cannabis induced psychotic disorder and schizotypal personality disorder and recent r/o schizophrenia.  Family report a history of becoming more isolative and decreased functioning since his late teenage years.  They also describe onset of Sabianist preoccupations and identifications in those years and that these constituted a difference from the beliefs of his early upbringing.  Differential again includes all three.  He is floridly psychotic.  He has poor insight and judgment and is dangerous to others.  We discussed the possibility of treatment over objection because he was noncompliant.  He started risperidone one mg at bedtime (first dose 11/4 ) titrated to 2mg at bedtime first dose 11/8.  Although he is showing some improvement and tolerating risperidone he is dangerous to others. Will continue to monitor risperidone 2mg at bedtime and are planning case conference for early this coming week.    1. Continue inpatient hospitalization for stabilization and safety.  2.  risperidone 2mg at bedtime.    3. 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 unclear but will avoid for now.  will give zyprexa im prns

## 2022-11-11 NOTE — BH INPATIENT PSYCHIATRY PROGRESS NOTE - OTHER
still somewhat odd odd still guarded, superficial, illogical less irritable "good" guarded superficially cooperative, minimized

## 2022-11-12 RX ADMIN — RISPERIDONE 2 MILLIGRAM(S): 4 TABLET ORAL at 20:02

## 2022-11-13 RX ADMIN — RISPERIDONE 2 MILLIGRAM(S): 4 TABLET ORAL at 20:16

## 2022-11-14 PROCEDURE — 99231 SBSQ HOSP IP/OBS SF/LOW 25: CPT

## 2022-11-14 RX ADMIN — RISPERIDONE 2 MILLIGRAM(S): 4 TABLET ORAL at 21:11

## 2022-11-14 NOTE — BH INPATIENT PSYCHIATRY PROGRESS NOTE - OTHER
neutral with an edge of irritability still somewhat odd "good" still guarded, superficial, illogical superficially cooperative, minimizing guarded

## 2022-11-14 NOTE — BH INPATIENT PSYCHIATRY PROGRESS NOTE - NSBHASSESSSUMMFT_PSY_ALL_CORE
26 yr old male, single, unemployed, and domiciled with parents. with hx of psychosis, non adherent to meds and psych aftercare, with multiple previous inpatient admissions.  he has carried various diagnoses including cannabis induced psychotic disorder and schizotypal personality disorder and recent r/o schizophrenia.  Family report a history of becoming more isolative and decreased functioning since his late teenage years.  They also describe onset of Mormon preoccupations and identifications in those years and that these constituted a difference from the beliefs of his early upbringing.  Differential again includes all three.  He is floridly psychotic.  He has poor insight and judgment and is dangerous to others.  We discussed the possibility of treatment over objection because he was noncompliant.  He started risperidone one mg at bedtime (first dose 11/4 ) titrated to 2mg at bedtime first dose 11/8.  Although he is showing some improvement and tolerating risperidone he is dangerous to others. WHe attempted to cheek risperidone 2mg last night.  We encourage higher doses of medicaiton but he refuses despite denying side effects at this time.  He claims family desires him home now however family relate they are wary of his return.  We are having a family meeting tomorrow at 1430.    1. Continue inpatient hospitalization for stabilization and safety.  2.  risperidone 2mg at bedtime.    3. 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 unclear but will avoid for now.  will give zyprexa im prns

## 2022-11-14 NOTE — BH INPATIENT PSYCHIATRY PROGRESS NOTE - NSBHFUPINTERVALHXFT_PSY_A_CORE
Staff report  no interval events, compliant with meds, sleeps. He continues to deny side effects but refuses titration of medication in fear of side effects.  Staff report he attempted to "cheek" his medication (oral risperidone syrup) attempting to spit it out but was ultimately compliant when staff pointed out he was trying to divert the medication.  Mother reports patient continues with hyperreligiosity when they visit.  Patient insists that family desires his discharge now but patient denies.  We are arranging a family meeting for 1430 pm tomorrow.  Staff report he socializes with peers and is less bizarre but participates less in groups and is somewhat guarded.

## 2022-11-15 PROCEDURE — 99232 SBSQ HOSP IP/OBS MODERATE 35: CPT

## 2022-11-15 RX ORDER — RISPERIDONE 4 MG/1
3 TABLET ORAL AT BEDTIME
Refills: 0 | Status: DISCONTINUED | OUTPATIENT
Start: 2022-11-15 | End: 2022-11-18

## 2022-11-15 RX ADMIN — RISPERIDONE 3 MILLIGRAM(S): 4 TABLET ORAL at 20:31

## 2022-11-15 NOTE — BH INPATIENT PSYCHIATRY PROGRESS NOTE - NSBHFUPINTERVALHXFT_PSY_A_CORE
Staff report no interval events.  He is again found in his room reading bible.  We had a meeting with writer, , patient and then with mother and father present by phone.  Patient had been insisting that his family was willing to take him home.  They had expressed to us concerns about patient previously.  We had meeting to clarify this confusion.  Patient insisted that he was safe to go home because he had not in fact harmed his father and family called the police and that everything was okay because they had called the police.  Patient related that he did not understand how or why family could be afraid or concerned about his behavior (reported that he "stuck a sock near your face and said 'I want to castrate you,' and you started screaming and I stuck my hand in your mouth to stop your screaming,") and related this to his readings of the Bible.  He went on to relate that he does not believe that he is mentally ill, "Everyday I read about thousands of people getting their heads chopped off in the bible," and that he did not understand why his family is disturbed and that "medications wont do anything,"  He does not want to accept higher doses of risperidone.

## 2022-11-15 NOTE — BH INPATIENT PSYCHIATRY PROGRESS NOTE - NSBHASSESSSUMMFT_PSY_ALL_CORE
26 yr old male, single, unemployed, and domiciled with parents. with hx of psychosis, non adherent to meds and psych aftercare, with multiple previous inpatient admissions.  he has carried various diagnoses including cannabis induced psychotic disorder and schizotypal personality disorder and recent r/o schizophrenia.  Family report a history of becoming more isolative and decreased functioning since his late teenage years.  They also describe onset of Christianity preoccupations and identifications in those years and that these constituted a difference from the beliefs of his early upbringing.  Differential again includes all three.  He is floridly psychotic.  He has poor insight and judgment and is dangerous to others.  We discussed the possibility of treatment over objection because he was noncompliant.  He started risperidone one mg at bedtime (first dose 11/4 ) titrated to 2mg at bedtime first dose 11/8.  Although he is showing some improvement and tolerating risperidone he is dangerous to others. WHe attempted to cheek risperidone 2mg last night.  We encourage higher doses of medicaiton but he refuses despite denying side effects at this time.  Family meeting was disturbing.  We had been continuing risperidone at 2mg despite recommending higher doses.  We feel we have no choice but to increase dose as he is psychotic and dangeorus to his family.  We will offer risperidone 3mg at bedtime and seek medication over objeciton.    1. Continue inpatient hospitalization for stabilization and safety.  2.  risperidone 3mg at bedtime.    3. 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 unclear but will avoid for now.  will give zyprexa im prns

## 2022-11-16 PROCEDURE — 99231 SBSQ HOSP IP/OBS SF/LOW 25: CPT

## 2022-11-16 RX ADMIN — RISPERIDONE 3 MILLIGRAM(S): 4 TABLET ORAL at 20:28

## 2022-11-16 NOTE — DIETITIAN INITIAL EVALUATION ADULT - PERTINENT MEDS FT
MEDICATIONS  (STANDING):  influenza   Vaccine 0.5 milliLiter(s) IntraMuscular once  risperiDONE   Solution 3 milliGRAM(s) Oral at bedtime

## 2022-11-16 NOTE — BH INPATIENT PSYCHIATRY PROGRESS NOTE - NSBHASSESSSUMMFT_PSY_ALL_CORE
26 yr old male, single, unemployed, and domiciled with parents. with hx of psychosis, non adherent to meds and psych aftercare, with multiple previous inpatient admissions.  he has carried various diagnoses including cannabis induced psychotic disorder and schizotypal personality disorder and recent r/o schizophrenia.  Family report a history of becoming more isolative and decreased functioning since his late teenage years.  They also describe onset of Sikh preoccupations and identifications in those years and that these constituted a difference from the beliefs of his early upbringing.  Differential again includes all three.  He is floridly psychotic.  He has poor insight and judgment and is dangerous to others.  We discussed the possibility of treatment over objection because he was noncompliant.  He started risperidone one mg at bedtime (first dose 11/4 ) titrated to 2mg at bedtime first dose 11/8.  Although he is showing some improvement and tolerating risperidone he is dangerous to others. WHe attempted to cheek risperidone 2mg last night.  We encourage higher doses of medicaiton but he refuses despite denying side effects at this time.  Family meeting was disturbing.  We had been continuing risperidone at 2mg despite recommending higher doses.  We feel we have no choice but to increase dose as he is psychotic and dangeorus to his family.  He accepted risepridone 3mg at bedtime because he feels it is the only way he can be discharged from hospital and not out of something more insightful    1. Continue inpatient hospitalization for stabilization and safety.  2.  risperidone 3mg at bedtime.    3. 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 unclear but will avoid for now.  will give zyprexa im prns

## 2022-11-16 NOTE — BH CHART NOTE - NSEVENTNOTEFT_PSY_ALL_CORE
DIRECTOR OF INPATIENT PSYCHIATRY NOTE:  I have evaluated the patient’s need for medication over his objection in the presence of the LS  Mr. Gildardo Lacey, the risks and benefits of medication, and his ability to make a reasoned decision.      Briefly, the pt is a 26 yr old male, single, unemployed, and domiciled with parents. with hx of psychosis, non adherent to meds and psych aftercare, with prior hx of psych admissions including [facility] admission back in 12/30/19-1/7/20 where he was diagnosed with cannabis-induced psychosis + schizoaffective disorder.  no reported hx of SA.  Has hx of frequent marijuana use.  Tonight, presented to the ED BIB EMS activated by parents due to erratic behavior; agitated and displaying bizarre behavior at home.     On evaluation, pt stated that his father called police when he asked his father whether he could cut off his balls.  He continued to say that when dogs were not listening, you cut their balls. His father treats him with o respect and he was tying to "just get my point across" by doing this. Denies having had intention ot kill his father. Pt states that he takes medication so that he could get out of the hospital.   He has prescribed Risperdal. Initially he was not compliant with medication though he started taking it.    He does not understand the extent of his illness.    It is my opinion that this patient currently experiences psychotic symptoms that are severely impacting his safety and ability to care for himself, and would likely benefit from antipsychotic medications.  Furthermore, it is my opinion that he lacks capacity to refuse antipsychotic therapy.  I have informed the patient of my decision and that if he withdraws his consent to taking medications, we will make application to court for authorization to treat him over his objection. I have notified the patient and LS of this decision by letter.

## 2022-11-16 NOTE — DIETITIAN INITIAL EVALUATION ADULT - OTHER INFO
Pt admitted to [facility] for Non-organic Psychosis. H/O cannabis abuse. No pertinent medical history. Met Pt in his room. Reports good appetite/po intake at present. No GI distress noted. UBW: 150 lbs. No recent weight changes.  NKFA.

## 2022-11-16 NOTE — BH INPATIENT PSYCHIATRY PROGRESS NOTE - NSBHFUPINTERVALHXFT_PSY_A_CORE
Staff report compliant with risperidone 3mg at bedtime, calm.  He relates "I will do what I have to do to get out of here" when discussing medication over objeciton.  He has a very utilitarian view of treatment and it is not particularly insightful> S dre wants to return to family but continues to see no understanding of his family's concerns about his behavior (castration) and seems intent on returning to them.

## 2022-11-17 PROCEDURE — 99231 SBSQ HOSP IP/OBS SF/LOW 25: CPT

## 2022-11-17 RX ADMIN — RISPERIDONE 3 MILLIGRAM(S): 4 TABLET ORAL at 20:34

## 2022-11-17 NOTE — BH INPATIENT PSYCHIATRY PROGRESS NOTE - NSBHASSESSSUMMFT_PSY_ALL_CORE
26 yr old male, single, unemployed, and domiciled with parents. with hx of psychosis, non adherent to meds and psych aftercare, with multiple previous inpatient admissions.  he has carried various diagnoses including cannabis induced psychotic disorder and schizotypal personality disorder and recent r/o schizophrenia.  Family report a history of becoming more isolative and decreased functioning since his late teenage years.  They also describe onset of Sabianist preoccupations and identifications in those years and that these constituted a difference from the beliefs of his early upbringing.  Differential again includes all three.  He is floridly psychotic.  He has poor insight and judgment and is dangerous to others.  We discussed the possibility of treatment over objection because he was noncompliant.  He started risperidone one mg at bedtime (first dose 11/4 ) titrated to 2mg at bedtime first dose 11/8.  Although he is showing some improvement and tolerating risperidone he is dangerous to others. WHe attempted to cheek risperidone 2mg last night.  We encourage higher doses of medicaiton but he refuses despite denying side effects at this time.  Family meeting was disturbing.  We had been continuing risperidone at 2mg despite recommending higher doses.  We feel we have no choice but to increase dose as he is psychotic and dangeorus to his family.  last night was dose # 2 of risperidone 3mg at bedtime and he has no side effect complaints.      1. Continue inpatient hospitalization for stabilization and safety.  2.  risperidone 3mg at bedtime.    3. 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 unclear but will avoid for now.  will give zyprexa im prns

## 2022-11-17 NOTE — BH INPATIENT PSYCHIATRY PROGRESS NOTE - NSBHFUPINTERVALHXFT_PSY_A_CORE
Staff report compliant with risperidone 3mg at bedtime, calm.  They report he is visible, calm but has poor boundaries with a female peer.  Ther is no jacki contact and he is not inappropriate with her but he does seem to be "interested" in her and acknowledges that he needs to be appropriate. He remains religiously preoccupied.

## 2022-11-17 NOTE — BH INPATIENT PSYCHIATRY PROGRESS NOTE - OTHER
neutral with an edge of irritability guarded superficially cooperative "fine" contineus to do better when not challenged

## 2022-11-18 PROCEDURE — 99232 SBSQ HOSP IP/OBS MODERATE 35: CPT

## 2022-11-18 PROCEDURE — 90853 GROUP PSYCHOTHERAPY: CPT

## 2022-11-18 RX ORDER — RISPERIDONE 4 MG/1
4 TABLET ORAL AT BEDTIME
Refills: 0 | Status: DISCONTINUED | OUTPATIENT
Start: 2022-11-18 | End: 2022-11-21

## 2022-11-18 RX ADMIN — RISPERIDONE 4 MILLIGRAM(S): 4 TABLET ORAL at 20:40

## 2022-11-18 NOTE — BH INPATIENT PSYCHIATRY PROGRESS NOTE - NSBHASSESSSUMMFT_PSY_ALL_CORE
26 yr old male, single, unemployed, and domiciled with parents. with hx of psychosis, non adherent to meds and psych aftercare, with multiple previous inpatient admissions.  he has carried various diagnoses including cannabis induced psychotic disorder and schizotypal personality disorder and recent r/o schizophrenia.  Family report a history of becoming more isolative and decreased functioning since his late teenage years.  They also describe onset of Anabaptist preoccupations and identifications in those years and that these constituted a difference from the beliefs of his early upbringing.  Differential again includes all three.  He is floridly psychotic.  He has poor insight and judgment and is dangerous to others.  We discussed the possibility of treatment over objection because he was noncompliant.  He started risperidone one mg at bedtime (first dose 11/4 ) titrated to 2mg at bedtime first dose 11/8.  Although he is showing some improvement and tolerating risperidone he is dangerous to others. WHe attempted to cheek risperidone 2mg last night.  We encourage higher doses of medicaiton but he refuses despite denying side effects at this time.  Family meeting was disturbing.  We had been continuing risperidone at 2mg despite recommending higher doses.  We feel we have no choice but to increase dose as he is psychotic and dangeorus to his family.  last night was dose # 3 of risperidone 3mg at bedtime and he has no side effect complaints.  Will incresae to risperidone 4mg at bedtime as he is still dangeorus to others.    1. Continue inpatient hospitalization for stabilization and safety.  2.  risperidone 4mg at bedtime.    3. 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 unclear but will avoid for now.  will give zyprexa im prns

## 2022-11-18 NOTE — BH PSYCHOLOGY - GROUP THERAPY NOTE - NSPSYCHOLGRPGENPT_PSY_A_CORE FT
Patient attended the psychology cognitive-behavior therapy group. The discussion was focused on the topic of Motivation.  Group expectations, guidelines, and confidentiality were reviewed.  The group began with a description of motivation and its role in changing patterns when adapting to new situations. Group members were then encouraged to identify some problems they would like to address and focus on one important change that could change the problem. Group members then learned about how to take the necessary steps towards assessing their degree of ambivalence and conduct a Cost Benefits Analysis to help reevaluate their willingness to change.  Discussion stemmed from the group's focus on the connection between thoughts, feelings and behaviors.  Group members demonstrated their understanding through active participation, discussion, and by asking clarifying questions.  Members were also provided with a handout describing the concepts and strategies discussed during group.

## 2022-11-18 NOTE — BH INPATIENT PSYCHIATRY PROGRESS NOTE - NSBHFUPINTERVALHXFT_PSY_A_CORE
Staff report compliant with risperidone 3mg at bedtime, calm.  Makes odd comments about "power over others" in group.  Collateral report at times make strident comments about Bahai.  He remains pragmatic and denies medication side effects and agrees to accept rispesridone 4mg at bedtime.

## 2022-11-18 NOTE — BH PSYCHOLOGY - GROUP THERAPY NOTE - NSBHPSYCHOLPARTICIPCOMMENT_PSY_A_CORE FT
Patient arrived late to the group session. Patient appeared interested in the group topic. Patient actively contributed to the discussion when  requested volunteers. Patient was able to engage with  and other members appropriately.

## 2022-11-18 NOTE — BH PSYCHOLOGY - GROUP THERAPY NOTE - TOKEN PULL-DIAGNOSIS
Primary Diagnosis:  Schizophrenia spectrum and other psychotic disorders [F29]        Problem Dx:   Cannabis abuse, daily use [F12.10]

## 2022-11-18 NOTE — BH INPATIENT PSYCHIATRY PROGRESS NOTE - OTHER
"good" continues to do better when not challenged becomes odd and intense when engaged around Taoism guarded neutral with an edge of irritability superficially cooperative

## 2022-11-18 NOTE — BH PSYCHOLOGY - GROUP THERAPY NOTE - NSPSYCHOLGRPGENGOAL_PSY_A_CORE
assessment/decrease symptoms/improve level of independent functioning/improve social/vocational/coping skills/prevent relapse/psychoeducation/treatment compliance

## 2022-11-19 RX ADMIN — RISPERIDONE 4 MILLIGRAM(S): 4 TABLET ORAL at 20:26

## 2022-11-20 RX ADMIN — RISPERIDONE 4 MILLIGRAM(S): 4 TABLET ORAL at 20:17

## 2022-11-21 RX ORDER — PALIPERIDONE 1.5 MG/1
9 TABLET, EXTENDED RELEASE ORAL AT BEDTIME
Refills: 0 | Status: DISCONTINUED | OUTPATIENT
Start: 2022-11-21 | End: 2022-11-22

## 2022-11-21 RX ADMIN — PALIPERIDONE 9 MILLIGRAM(S): 1.5 TABLET, EXTENDED RELEASE ORAL at 20:30

## 2022-11-21 NOTE — BH INPATIENT PSYCHIATRY PROGRESS NOTE - OTHER
superficially cooperative "alright" still guarded, superficial, illogical,a t times thoughts derail continues to do better when not challenged becomes odd and intense when engaged around Mu-ism guarded neutral with an edge of irritability

## 2022-11-21 NOTE — BH INPATIENT PSYCHIATRY PROGRESS NOTE - NSBHFUPINTERVALHXFT_PSY_A_CORE
Staff report compliant with risperidone 4mg at bedtime, calm.  He is preoccupied with discharge planning but still does not appreciate how his behaviors prior to hospitalization are disturbing. In group therapy, his thoughts ladder over from asking   what's her interpretation of "Pio" to correcting her that it is pronounced  as something approximating [place] [misspelling is fault of writer] to noting that "That sounds like 'c'est soledad' in [place].  'That's me'".  Discussed and will change risperidone to paliperidone and give 6mg po starting tonight.

## 2022-11-21 NOTE — BH INPATIENT PSYCHIATRY PROGRESS NOTE - NSBHASSESSSUMMFT_PSY_ALL_CORE
26 yr old male, single, unemployed, and domiciled with parents. with hx of psychosis, non adherent to meds and psych aftercare, with multiple previous inpatient admissions.  he has carried various diagnoses including cannabis induced psychotic disorder and schizotypal personality disorder and recent r/o schizophrenia.  Family report a history of becoming more isolative and decreased functioning since his late teenage years.  They also describe onset of Tenriism preoccupations and identifications in those years and that these constituted a difference from the beliefs of his early upbringing.  Differential again includes all three.  He is floridly psychotic.  He has poor insight and judgment and is dangerous to others.  We discussed the possibility of treatment over objection because he was noncompliant.  He started risperidone one mg at bedtime (first dose 11/4 ) titrated to 2mg at bedtime first dose 11/8.  Although he is showing some improvement and tolerating risperidone he is dangerous to others. WHe attempted to cheek risperidone 2mg last night.  We encourage higher doses of medicaiton but he refuses despite denying side effects at this time.  Family meeting was disturbing. We continued to titrate [id number] to 4mg at bedtime.  Although he is in control on the unit and compliant with medication, he shows little insight into the disturbing nature of his behaviors and there is evidence that he remains delusional.  he agrees to switch to invega and start 9mg at bedtime first dose 11/21.    1. Continue inpatient hospitalization for stabilization and safety.  2.  paliperidone 9mg at bedtime.    3. 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 unclear but will avoid for now.  will give zyprexa im prns

## 2022-11-22 PROCEDURE — 99232 SBSQ HOSP IP/OBS MODERATE 35: CPT

## 2022-11-22 RX ORDER — PALIPERIDONE 1.5 MG/1
9 TABLET, EXTENDED RELEASE ORAL AT BEDTIME
Refills: 0 | Status: DISCONTINUED | OUTPATIENT
Start: 2022-11-22 | End: 2022-11-30

## 2022-11-22 RX ADMIN — PALIPERIDONE 9 MILLIGRAM(S): 1.5 TABLET, EXTENDED RELEASE ORAL at 20:04

## 2022-11-22 NOTE — BH PSYCHOLOGY - GROUP THERAPY NOTE - NSBHPSYCHOLGRPNUM_PSY_A_CORE
Received prescription renewal request for amphetamine-dextroamphetamine (ADDERALL) 15 MG tablet, amphetamine-dextroamphetamine (ADDERALL XR) 30 MG 24 hr capsule    Last appt: 08/03/2022  Missed appt(s): NONE  Follow up 6 months  Next appt:  NONE    Verified dosage(s) against:   [x] provider appt note   [x] Other: Medication list/Rx history    Last Rx authorized on 10/21/2022 for 30 day supply.  Per ePDMP, last dispensed on   10/24/2022-Adderall XR 30 MG  11/03/2022, sold 11/04/2022-Adderall 15 MG    Is the patient due for refill of this medication(s):   [x]  Yes  []  NO    PDMP review:   [x]  Criteria met. No refill protocol, prescription sent to provider to sign.  []  Criteria NOT MET-      12

## 2022-11-22 NOTE — BH INPATIENT PSYCHIATRY PROGRESS NOTE - OTHER
superficially cooperative  becomes odd and intense when engaged around Yarsanism guarded still guarded, superficial, illogical,a t times thoughts derail "fine" neutral with an edge of irritability

## 2022-11-22 NOTE — BH INPATIENT PSYCHIATRY PROGRESS NOTE - NSBHASSESSSUMMFT_PSY_ALL_CORE
26 yr old male, single, unemployed, and domiciled with parents. with hx of psychosis, non adherent to meds and psych aftercare, with multiple previous inpatient admissions.  he has carried various diagnoses including cannabis induced psychotic disorder and schizotypal personality disorder and recent r/o schizophrenia.  Family report a history of becoming more isolative and decreased functioning since his late teenage years.  They also describe onset of Mu-ism preoccupations and identifications in those years and that these constituted a difference from the beliefs of his early upbringing.  Differential again includes all three.  He is floridly psychotic.  He has poor insight and judgment and is dangerous to others.  We discussed the possibility of treatment over objection because he was noncompliant.  He started risperidone one mg at bedtime (first dose 11/4 ) titrated to 2mg at bedtime first dose 11/8.  Although he is showing some improvement and tolerating risperidone he is dangerous to others. WHe attempted to cheek risperidone 2mg last night.  We encourage higher doses of medication but he refuses despite denying side effects at this time.  Family meeting was disturbing. We continued to titrate [id number] to 4mg at bedtime.  Although he is in control on the unit and compliant with medication, he shows little insight into the disturbing nature of his behaviors and there is evidence that he remains delusional.  he agreed to switch to invega and started 9mg at bedtime first dose 11/21.    1. Continue inpatient hospitalization for stabilization and safety.  2.  paliperidone 9mg at bedtime.    3. 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 unclear but will avoid for now.  will give zyprexa im prns

## 2022-11-22 NOTE — BH INPATIENT PSYCHIATRY PROGRESS NOTE - NSBHFUPINTERVALHXFT_PSY_A_CORE
Staff report compliant, no prns, slept.  He has no complaints and continues to take a pragmatic attitude towards hospitalization, doing what he does not because he is participating in treatment but in with the purpose of getting discharged.

## 2022-11-23 PROCEDURE — 99231 SBSQ HOSP IP/OBS SF/LOW 25: CPT

## 2022-11-23 RX ADMIN — PALIPERIDONE 9 MILLIGRAM(S): 1.5 TABLET, EXTENDED RELEASE ORAL at 20:26

## 2022-11-23 NOTE — BH INPATIENT PSYCHIATRY PROGRESS NOTE - NSBHFUPINTERVALHXFT_PSY_A_CORE
Chart reviewed and case discussed with treatment team. No events reported overnight. Sleep and appetite is fair. Patient has an irritable edge but no behavioral issues reported on the unit. He denies any AVH or SI/HI and is focused on discharge. Patient stated that he sleeps a lot on the unit because he's bored. Patient is compliant with medications, no adverse effects reported.

## 2022-11-23 NOTE — BH INPATIENT PSYCHIATRY PROGRESS NOTE - NSBHASSESSSUMMFT_PSY_ALL_CORE
26 yr old male, single, unemployed, and domiciled with parents. with hx of psychosis, non adherent to meds and psych aftercare, with multiple previous inpatient admissions.  he has carried various diagnoses including cannabis induced psychotic disorder and schizotypal personality disorder and recent r/o schizophrenia.  Family report a history of becoming more isolative and decreased functioning since his late teenage years.  They also describe onset of Orthodoxy preoccupations and identifications in those years and that these constituted a difference from the beliefs of his early upbringing.  Differential again includes all three.  He is floridly psychotic.  He has poor insight and judgment and is dangerous to others.  We discussed the possibility of treatment over objection because he was noncompliant.  He started risperidone one mg at bedtime (first dose 11/4 ) titrated to 2mg at bedtime first dose 11/8.  Although he is showing some improvement and tolerating risperidone he is dangerous to others. WHe attempted to cheek risperidone 2mg last night.  We encourage higher doses of medication but he refuses despite denying side effects at this time.  Family meeting was disturbing. We continued to titrate [id number] to 4mg at bedtime.  Although he is in control on the unit and compliant with medication, he shows little insight into the disturbing nature of his behaviors and there is evidence that he remains delusional.  he agreed to switch to invega and started 9mg at bedtime first dose 11/21.    On assessment, patient is cooperative but remains irritable and guarded. He denies any SI.  Continue with plan as per primary team:    1. Continue inpatient hospitalization for stabilization and safety.  2.  paliperidone 9mg at bedtime.    3. 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 unclear but will avoid for now.  will give zyprexa im prns

## 2022-11-23 NOTE — BH INPATIENT PSYCHIATRY PROGRESS NOTE - OTHER
superficially cooperative "good" neutral with an edge of irritability guarded no delusions elicited

## 2022-11-24 RX ADMIN — PALIPERIDONE 9 MILLIGRAM(S): 1.5 TABLET, EXTENDED RELEASE ORAL at 21:16

## 2022-11-25 PROCEDURE — 99231 SBSQ HOSP IP/OBS SF/LOW 25: CPT

## 2022-11-25 RX ADMIN — PALIPERIDONE 9 MILLIGRAM(S): 1.5 TABLET, EXTENDED RELEASE ORAL at 20:40

## 2022-11-25 NOTE — BH INPATIENT PSYCHIATRY PROGRESS NOTE - NSBHATTESTATTENDCOLLABFT_PSY_A_CORE
Ongoing SW/CM Assessment/Plan of Care Note     See SW/CM flowsheets for goals and other objective data.    Patient/Family discharge goal (s):  Goal #1: Discharge to other institution(s) arranged          PT Recommendation:     Recommendation for Discharge: PT IL: Patient is appropriate for intensive daily Physical Therapy with the oversight of a Physical Medicine and Rehabilitation physician    OT Recommendation:     Recommendations for Discharge: OT IL: Patient is appropriate for intensive daily Occupational Therapy with the oversight of a Physical Medicine and Rehabilitation physician    Progress note:   Per physician, peer to peer was denied for AIR.  Per [facility] communication from [facility], this facility is OON with pt's insurance.  SW met with the pt and discussed this update.  She would like a facility near her home and is agreeable with [facility].  SNF referral sent to this facility.  MARYA will continue to follow up.   London Callahan, ROQUE  [phone]      
Dr. Hahn
Dr Nielsen
Dr. Hahn
Dr Nielsen

## 2022-11-25 NOTE — BH INPATIENT PSYCHIATRY PROGRESS NOTE - NSBHFUPINTERVALHXFT_PSY_A_CORE
Chart reviewed and case discussed with treatment team. No events reported overnight. Sleep and appetite is fair. Patient was isolative to his room and stated denied any SI/HI or AVH. Patient reported good visits with his family. Patient is compliant with medications, no adverse effects reported.

## 2022-11-25 NOTE — BH INPATIENT PSYCHIATRY PROGRESS NOTE - NSBHASSESSSUMMFT_PSY_ALL_CORE
26 yr old male, single, unemployed, and domiciled with parents. with hx of psychosis, non adherent to meds and psych aftercare, with multiple previous inpatient admissions.  he has carried various diagnoses including cannabis induced psychotic disorder and schizotypal personality disorder and recent r/o schizophrenia.  Family report a history of becoming more isolative and decreased functioning since his late teenage years.  They also describe onset of Restorationist preoccupations and identifications in those years and that these constituted a difference from the beliefs of his early upbringing.  Differential again includes all three.  He is floridly psychotic.  He has poor insight and judgment and is dangerous to others.  We discussed the possibility of treatment over objection because he was noncompliant.  He started risperidone one mg at bedtime (first dose 11/4 ) titrated to 2mg at bedtime first dose 11/8.  Although he is showing some improvement and tolerating risperidone he is dangerous to others. WHe attempted to cheek risperidone 2mg last night.  We encourage higher doses of medication but he refuses despite denying side effects at this time.  Family meeting was disturbing. We continued to titrate [id number] to 4mg at bedtime.  Although he is in control on the unit and compliant with medication, he shows little insight into the disturbing nature of his behaviors and there is evidence that he remains delusional.  he agreed to switch to invega and started 9mg at bedtime first dose 11/21.    On assessment, patient is cooperative but remains irritable and guarded. He denies any SI.  Continue with plan as per primary team:    1. Continue inpatient hospitalization for stabilization and safety.  2.  paliperidone 9mg at bedtime.    3. 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 unclear but will avoid for now.  will give zyprexa im prns

## 2022-11-26 RX ADMIN — PALIPERIDONE 9 MILLIGRAM(S): 1.5 TABLET, EXTENDED RELEASE ORAL at 20:38

## 2022-11-27 RX ADMIN — PALIPERIDONE 9 MILLIGRAM(S): 1.5 TABLET, EXTENDED RELEASE ORAL at 20:36

## 2022-11-28 PROCEDURE — 99231 SBSQ HOSP IP/OBS SF/LOW 25: CPT

## 2022-11-28 RX ADMIN — PALIPERIDONE 9 MILLIGRAM(S): 1.5 TABLET, EXTENDED RELEASE ORAL at 20:09

## 2022-11-28 NOTE — BH INPATIENT PSYCHIATRY PROGRESS NOTE - NSBHFUPINTERVALHXFT_PSY_A_CORE
Chart reviewed and case discussed with treatment team. Staff report no interval events.  He has questions about AOT and HEALY.  Discussed case with family who is concerned as he had some sort of reaction to an antipsychotic in the past.  We are austyn gto delay loading dose for a couple of days to make sure he contineus to tolerate paliperidone before injecting the HEALY into him.  Socializes with peers.

## 2022-11-28 NOTE — BH INPATIENT PSYCHIATRY PROGRESS NOTE - OTHER
superficially cooperative guarded although less irritable there is a bit of an edge to him and he is very focused on discharge and trying to expedite it less irritable no delusions elicited

## 2022-11-29 PROCEDURE — 99231 SBSQ HOSP IP/OBS SF/LOW 25: CPT

## 2022-11-29 RX ORDER — PALIPERIDONE 1.5 MG/1
234 TABLET, EXTENDED RELEASE ORAL ONCE
Refills: 0 | Status: COMPLETED | OUTPATIENT
Start: 2022-11-30 | End: 2022-11-30

## 2022-11-29 RX ADMIN — PALIPERIDONE 9 MILLIGRAM(S): 1.5 TABLET, EXTENDED RELEASE ORAL at 21:06

## 2022-11-29 NOTE — BH INPATIENT PSYCHIATRY PROGRESS NOTE - NSBHFUPINTERVALHXFT_PSY_A_CORE
Patient seen, chart reviewed, case discussed with team.  Staff report was awake for an hour overnight but more or less slept.  no interval events, compliant with meds, goes to groups, socializes with peers.  However, still symptomatic, dysfunciton with family reported yelling at someone on phone last night.  Activity therapists report he is starting to discuss events at home and expressing some remorse.  Nonetheless he remains with a pragmatic orientation to hospitlaization, wanting to speed things along to get discharged, asking after HEALY, speeding up process for sole purpose of discharge.  Will give HEALY in AM.  No evidence of medication side effects.

## 2022-11-29 NOTE — BH INPATIENT PSYCHIATRY PROGRESS NOTE - OTHER
superficially cooperative neutral "good" guarded no delusions elicited  although less irritable there is a bit of an edge to him and he is very focused on discharge and trying to expedite it

## 2022-11-29 NOTE — BH INPATIENT PSYCHIATRY PROGRESS NOTE - NSBHASSESSSUMMFT_PSY_ALL_CORE
26 yr old male, single, unemployed, and domiciled with parents. with hx of psychosis, non adherent to meds and psych aftercare, with multiple previous inpatient admissions.  he has carried various diagnoses including cannabis induced psychotic disorder and schizotypal personality disorder and recent r/o schizophrenia.  Family report a history of becoming more isolative and decreased functioning since his late teenage years.  They also describe onset of Zoroastrian preoccupations and identifications in those years and that these constituted a difference from the beliefs of his early upbringing.  Differential again includes all three.  He is floridly psychotic.  He has poor insight and judgment and is dangerous to others.  We discussed the possibility of treatment over objection because he was noncompliant.  He started risperidone one mg at bedtime (first dose 11/4 ) titrated to 2mg at bedtime first dose 11/8.  Although he is showing some improvement and tolerating risperidone he is dangerous to others. WHe attempted to cheek risperidone 2mg last night.  We encourage higher doses of medication but he refuses despite denying side effects at this time.  Family meeting was disturbing. We continued to titrate [id number] to 4mg at bedtime.  Although he is in control on the unit and compliant with medication, he shows little insight into the disturbing nature of his behaviors and there is evidence that he remains delusional.  he agreed to switch to invega and started 9mg at bedtime first dose 11/21.  He is tolerating invega with some fair effect and desires to try invega sustenna and we will give invega sustenna 234mg IM tomorrow.      1. Continue inpatient hospitalization for stabilization and safety.  2.  paliperidone 9mg at bedtime.  HEALY on Wednesday  3. 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 tolerating invega well and is within loading parameters, HEALY tomorrow.

## 2022-11-30 PROCEDURE — 99232 SBSQ HOSP IP/OBS MODERATE 35: CPT

## 2022-11-30 RX ORDER — DIPHENHYDRAMINE HCL 50 MG
50 CAPSULE ORAL ONCE
Refills: 0 | Status: DISCONTINUED | OUTPATIENT
Start: 2022-11-30 | End: 2022-12-20

## 2022-11-30 RX ADMIN — PALIPERIDONE 234 MILLIGRAM(S): 1.5 TABLET, EXTENDED RELEASE ORAL at 12:19

## 2022-11-30 NOTE — BH INPATIENT PSYCHIATRY PROGRESS NOTE - NSBHFUPINTERVALCCFT_PSY_A_CORE
Patient seen, chart reviewed, case discussed with team. Patient seen for follow up for psychosis and aggression.

## 2022-11-30 NOTE — BH INPATIENT PSYCHIATRY PROGRESS NOTE - NSBHFUPINTERVALHXFT_PSY_A_CORE
Staff report no interval events, compliant with meds, no prns, slept appropriate, social, participates in groups.  Accepted invega sustenna 234mg IM this am.  Will dc invega po.  Reviewed AOT process with patient, family.

## 2022-11-30 NOTE — BH INPATIENT PSYCHIATRY PROGRESS NOTE - OTHER
although less irritable there is a bit of an edge to him and he is very focused on discharge and trying to expedite it superficially cooperative "good" guarded no delusions elicited  neutral

## 2022-11-30 NOTE — BH INPATIENT PSYCHIATRY PROGRESS NOTE - NSBHASSESSSUMMFT_PSY_ALL_CORE
26 yr old male, single, unemployed, and domiciled with parents. with hx of psychosis, non adherent to meds and psych aftercare, with multiple previous inpatient admissions.  he has carried various diagnoses including cannabis induced psychotic disorder and schizotypal personality disorder and recent r/o schizophrenia.  Family report a history of becoming more isolative and decreased functioning since his late teenage years.  They also describe onset of Restorationist preoccupations and identifications in those years and that these constituted a difference from the beliefs of his early upbringing.  Differential again includes all three.  He is floridly psychotic.  He has poor insight and judgment and is dangerous to others.  We discussed the possibility of treatment over objection because he was noncompliant.  He started risperidone one mg at bedtime (first dose 11/4 ) titrated to 2mg at bedtime first dose 11/8.  Although he is showing some improvement and tolerating risperidone he is dangerous to others. WHe attempted to cheek risperidone 2mg last night.  We encourage higher doses of medication but he refuses despite denying side effects at this time.  Family meeting was disturbing. We continued to titrate [id number] to 4mg at bedtime.  Although he is in control on the unit and compliant with medication, he shows little insight into the disturbing nature of his behaviors and there is evidence that he remains delusional.  he agreed to switch to invega and started 9mg at bedtime first dose 11/21.  He tolerate invega 9 mg po with some fair effect and received loading dose #1 (234mg IM) on 11/30.  Last dose of po was 11/29..      1. Continue inpatient hospitalization for stabilization and safety.  2.  invega sustenna 234mg IM 11/30; dcd paliperidone 9mg at bedtime last dose 11/29 pm  3. 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 tolerating invega well, received sustenna dose #1 11/30 as above, continue to monitor

## 2022-12-01 PROCEDURE — 99231 SBSQ HOSP IP/OBS SF/LOW 25: CPT

## 2022-12-01 NOTE — UM REPORT PROGRESS NOTE - NSUMSWNOTE_GEN_A_CORE FT
Pt is compliant with medications and is in good behavioral control. Patient still mostly reads bible in his room when he is not visible on the unit, but talks less about it with treatment team. Patient received first HEALY of invega sustena yesterday.  PT is compliant with medications and in good behavioral control. He verbalizes understanding of waiting for AOT to be completed prior to leaving. He is working with SW appropriately to move forward. He is receiving second part of loading dose of HEALY tomorrow.  Pt is compliant with medications. He is agreeable to the AOT process and has been engaging in groups and visible on the unit. He is in good behavioral control, still will talk about how he is reading the bible, but is less preoccupied about it.

## 2022-12-01 NOTE — UM REPORT PROGRESS NOTE - NSUMSWACTION_GEN_A_CORE FT
SW submitted AOT referral, however is making changes as patient's mother reports she will now allow him to return home if AOT is completed. Patient verbalizes understanding of this plan. Patient's mother confirmed that if patient were to be discharged with AOT, ACT, and a submitted HRA, she would be willing to have patient return to live with family. She reports prior hesitancy due to having this plan at another hospital, but that when patient discharged home to her AOT was not completed, and there was no court order. Pt was assigned to [facility] as no ACT Team available at this time. SW outreached for enrollment. Pt agreeing to referral to [facility] program for treatment as an interim. HRA in works.  Pt was assigned to [facility] as no ACT Team available at this time. PT accepted to [facility] for clinic and PROS treatment. We had [facility] review of final AOT tx plan today and are tentative for court next Tuesday. Patient will be discharging after.

## 2022-12-01 NOTE — BH INPATIENT PSYCHIATRY PROGRESS NOTE - OTHER
neutral Orthodoxy preoccupations/delusions on more extensive probing guarded superficially cooperative "okay" although less irritable there is a bit of an edge to him and he is very focused on discharge and trying to expedite it

## 2022-12-01 NOTE — UM REPORT PROGRESS NOTE - NSUMSWPRIORDCOTH_GEN_A_CORE FT
Pt originally being referred to shelter as parents refusing to have him home, and stating they would get OOP. However patient's mother now stating she will take him home if services are in order prior to discharge: AOT/ACT/HRA submitted. We are in agreement with this plan. no

## 2022-12-01 NOTE — LEGAL STATUS PROGRESS NOTE - NSLEGALSTATUS_PSY_ALL_CORE
9.39 Emergency Admission
9.27 Involuntary (2PC) Converted from Emergency
9.27 Involuntary (2PC) Converted from Emergency

## 2022-12-01 NOTE — BH INPATIENT PSYCHIATRY PROGRESS NOTE - NSBHASSESSSUMMFT_PSY_ALL_CORE
26 yr old male, single, unemployed, and domiciled with parents. with hx of psychosis, non adherent to meds and psych aftercare, with multiple previous inpatient admissions.  he has carried various diagnoses including cannabis induced psychotic disorder and schizotypal personality disorder and recent r/o schizophrenia.  Family report a history of becoming more isolative and decreased functioning since his late teenage years.  They also describe onset of Yazidism preoccupations and identifications in those years and that these constituted a difference from the beliefs of his early upbringing.  Differential again includes all three.  He is floridly psychotic.  He has poor insight and judgment and is dangerous to others.  We discussed the possibility of treatment over objection because he was noncompliant.  He started risperidone one mg at bedtime (first dose 11/4 ) titrated to 2mg at bedtime first dose 11/8.  Although he is showing some improvement and tolerating risperidone he is dangerous to others. WHe attempted to cheek risperidone 2mg last night.  We encourage higher doses of medication but he refuses despite denying side effects at this time.  Family meeting was disturbing. We continued to titrate [id number] to 4mg at bedtime.  Although he is in control on the unit and compliant with medication, he shows little insight into the disturbing nature of his behaviors and there is evidence that he remains delusional.  he agreed to switch to invega and started 9mg at bedtime first dose 11/21.  He tolerated invega 9 mg po with some fair effect and received loading dose #1 (234mg IM) on 11/30.  Last dose of po was 11/29..      1. Continue inpatient hospitalization for stabilization and safety.  2.  invega sustenna 234mg IM 11/30; dcd paliperidone 9mg at bedtime last dose 11/29 pm  3. 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 tolerating invega well, received sustenna dose #1 11/30 as above, continue to monitor

## 2022-12-01 NOTE — BH INPATIENT PSYCHIATRY PROGRESS NOTE - NSBHFUPINTERVALHXFT_PSY_A_CORE
Staff report no interval events, compliant with meds, no prns, slept appropriate, social, participates in groups.  He complained of a single episode of diarrhea overnight, insisting that it was due to medicaiton side effects.  He reports no further diarrhea (unclear if diarrhea v. loose stools).  In any event, it has resolved and I note that risperidone more likely to cause constipation than diarrhea.  He seems to be tolerating.

## 2022-12-02 NOTE — BH INPATIENT PSYCHIATRY PROGRESS NOTE - NSBHASSESSSUMMFT_PSY_ALL_CORE
26 yr old male, single, unemployed, and domiciled with parents. with hx of psychosis, non adherent to meds and psych aftercare, with multiple previous inpatient admissions.  he has carried various diagnoses including cannabis induced psychotic disorder and schizotypal personality disorder and recent r/o schizophrenia.  Family report a history of becoming more isolative and decreased functioning since his late teenage years.  They also describe onset of Yarsani preoccupations and identifications in those years and that these constituted a difference from the beliefs of his early upbringing.  Differential again includes all three.  He is floridly psychotic.  He has poor insight and judgment and is dangerous to others.  We discussed the possibility of treatment over objection because he was noncompliant.  He started risperidone one mg at bedtime (first dose 11/4 ) titrated to 2mg at bedtime first dose 11/8.  Although he is showing some improvement and tolerating risperidone he is dangerous to others. WHe attempted to cheek risperidone 2mg last night.  We encourage higher doses of medication but he refuses despite denying side effects at this time.  Family meeting was disturbing. We continued to titrate [id number] to 4mg at bedtime.  Although he is in control on the unit and compliant with medication, he shows little insight into the disturbing nature of his behaviors and there is evidence that he remains delusional.  he agreed to switch to invega and started 9mg at bedtime first dose 11/21.  He tolerated invega 9 mg po with some fair effect and received loading dose #1 (234mg IM) on 11/30.  Last dose of po was 11/29..      1. Continue inpatient hospitalization for stabilization and safety.  2.  invega sustenna 234mg IM 11/30; dcd paliperidone 9mg at bedtime last dose 11/29 pm  3. 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 tolerating invega well, received sustenna dose #1 11/30 as above, continue to monitor

## 2022-12-02 NOTE — BH INPATIENT PSYCHIATRY PROGRESS NOTE - OTHER
"alright" less preoccupied with discharge neutral to euthymic superficially cooperative guarded Alevism preoccupations/delusions on more extensive probing

## 2022-12-02 NOTE — BH INPATIENT PSYCHIATRY PROGRESS NOTE - NSBHFUPINTERVALHXFT_PSY_A_CORE
Staff report no interval events, compliant with meds, no prns, slept asocial.  Still spends time with particular female peer but is not inappropriate.  Asks for sharps and he has been in control and calm and so we agree.

## 2022-12-05 PROCEDURE — 99231 SBSQ HOSP IP/OBS SF/LOW 25: CPT

## 2022-12-05 NOTE — BH INPATIENT PSYCHIATRY PROGRESS NOTE - OTHER
although less irritable there is a bit of an edge to him and he is very focused on discharge and trying to expedite it "okay" guarded Congregational preoccupations/delusions on more extensive probing neutral to euthymic less preoccupied with discharge

## 2022-12-05 NOTE — BH INPATIENT PSYCHIATRY PROGRESS NOTE - NSBHASSESSSUMMFT_PSY_ALL_CORE
26 yr old male, single, unemployed, and domiciled with parents. with hx of psychosis, non adherent to meds and psych aftercare, with multiple previous inpatient admissions.  he has carried various diagnoses including cannabis induced psychotic disorder and schizotypal personality disorder and recent r/o schizophrenia.  Family report a history of becoming more isolative and decreased functioning since his late teenage years.  They also describe onset of Uatsdin preoccupations and identifications in those years and that these constituted a difference from the beliefs of his early upbringing.  Differential again includes all three.  He is floridly psychotic.  He has poor insight and judgment and is dangerous to others.  We discussed the possibility of treatment over objection because he was noncompliant.  He started risperidone one mg at bedtime (first dose 11/4 ) titrated to 2mg at bedtime first dose 11/8.  Although he is showing some improvement and tolerating risperidone he is dangerous to others. WHe attempted to cheek risperidone 2mg last night.  We encourage higher doses of medication but he refuses despite denying side effects at this time.  Family meeting was disturbing. We continued to titrate [id number] to 4mg at bedtime.  Although he is in control on the unit and compliant with medication, he shows little insight into the disturbing nature of his behaviors and there is evidence that he remains delusional.  he agreed to switch to invega and started 9mg at bedtime first dose 11/21.  He tolerated invega 9 mg po with some fair effect and received loading dose #1 (234mg IM) on 11/30.  Last dose of po was 11/29.  Due for second part of loading dose on invega sustenna 12/8/2022.    1. Continue inpatient hospitalization for stabilization and safety.  2.  invega sustenna 234mg IM 11/30; dcd paliperidone 9mg at bedtime last dose 11/29 pm  3. 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 tolerating invega well, received sustenna dose #1 11/30 as above, continue to monitor

## 2022-12-05 NOTE — BH INPATIENT PSYCHIATRY PROGRESS NOTE - NSBHFUPINTERVALHXFT_PSY_A_CORE
Staff report no interval events, compliant with meds, no prns, slept.  social.  Still spends time with particular female peer but is not inappropriate.  Quesitons about AOT.

## 2022-12-06 NOTE — BH INPATIENT PSYCHIATRY PROGRESS NOTE - NSBHASSESSSUMMFT_PSY_ALL_CORE
26 yr old male, single, unemployed, and domiciled with parents. with hx of psychosis, non adherent to meds and psych aftercare, with multiple previous inpatient admissions.  he has carried various diagnoses including cannabis induced psychotic disorder and schizotypal personality disorder and recent r/o schizophrenia.  Family report a history of becoming more isolative and decreased functioning since his late teenage years.  They also describe onset of Church preoccupations and identifications in those years and that these constituted a difference from the beliefs of his early upbringing.  Differential again includes all three.  He is floridly psychotic.  He has poor insight and judgment and is dangerous to others.  We discussed the possibility of treatment over objection because he was noncompliant.  He started risperidone one mg at bedtime (first dose 11/4 ) titrated to 2mg at bedtime first dose 11/8.  Although he is showing some improvement and tolerating risperidone he is dangerous to others. WHe attempted to cheek risperidone 2mg last night.  We encourage higher doses of medication but he refuses despite denying side effects at this time.  Family meeting was disturbing. We continued to titrate [id number] to 4mg at bedtime.  Although he is in control on the unit and compliant with medication, he shows little insight into the disturbing nature of his behaviors and there is evidence that he remains delusional.  he agreed to switch to invega and started 9mg at bedtime first dose 11/21.  He tolerated invega 9 mg po with some fair effect and received loading dose #1 (234mg IM) on 11/30.  Last dose of po was 11/29.  Due for second part of loading dose on invega sustenna 12/8/2022.    1. Continue inpatient hospitalization for stabilization and safety.  2.  invega sustenna 234mg IM 11/30; dcd paliperidone 9mg at bedtime last dose 11/29 pm  3. 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 tolerating invega well, received sustenna dose #1 11/30 as above, continue to monitor

## 2022-12-06 NOTE — BH INPATIENT PSYCHIATRY PROGRESS NOTE - NSBHFUPINTERVALHXFT_PSY_A_CORE
Staff report no interval events, compliant with meds, no prns, slept.  social.  Still spends time with particular female peer but is not inappropriate.  HEALY Thursday.

## 2022-12-06 NOTE — BH INPATIENT PSYCHIATRY PROGRESS NOTE - OTHER
although less irritable there is a bit of an edge to him and he is very focused on discharge and trying to expedite it Synagogue preoccupations/delusions on more extensive probing less preoccupied with discharge "okay" neutral to euthymic guarded

## 2022-12-07 PROCEDURE — 99231 SBSQ HOSP IP/OBS SF/LOW 25: CPT

## 2022-12-07 RX ORDER — PALIPERIDONE 1.5 MG/1
156 TABLET, EXTENDED RELEASE ORAL ONCE
Refills: 0 | Status: COMPLETED | OUTPATIENT
Start: 2022-12-08 | End: 2022-12-08

## 2022-12-07 NOTE — BH INPATIENT PSYCHIATRY PROGRESS NOTE - OTHER
neutral to euthymic guarded "okay" Jew preoccupations/delusions on more extensive probing less preoccupied with discharge

## 2022-12-07 NOTE — BH INPATIENT PSYCHIATRY PROGRESS NOTE - NSBHFUPINTERVALHXFT_PSY_A_CORE
Staff report no interval events, compliant with meds, no prns, slept.  social.  Goes to groups but found in his room reading Bible.  MONET Tomorrow.

## 2022-12-07 NOTE — BH INPATIENT PSYCHIATRY PROGRESS NOTE - NSBHASSESSSUMMFT_PSY_ALL_CORE
26 yr old male, single, unemployed, and domiciled with parents. with hx of psychosis, non adherent to meds and psych aftercare, with multiple previous inpatient admissions.  he has carried various diagnoses including cannabis induced psychotic disorder and schizotypal personality disorder and recent r/o schizophrenia.  Family report a history of becoming more isolative and decreased functioning since his late teenage years.  They also describe onset of Christianity preoccupations and identifications in those years and that these constituted a difference from the beliefs of his early upbringing.  Differential again includes all three.  He is floridly psychotic.  He has poor insight and judgment and is dangerous to others.  We discussed the possibility of treatment over objection because he was noncompliant.  He started risperidone one mg at bedtime (first dose 11/4 ) titrated to 2mg at bedtime first dose 11/8.  Although he is showing some improvement and tolerating risperidone he is dangerous to others. WHe attempted to cheek risperidone 2mg last night.  We encourage higher doses of medication but he refuses despite denying side effects at this time.  Family meeting was disturbing. We continued to titrate [id number] to 4mg at bedtime.  Although he is in control on the unit and compliant with medication, he shows little insight into the disturbing nature of his behaviors and there is evidence that he remains delusional.  he agreed to switch to invega and started 9mg at bedtime first dose 11/21.  He tolerated invega 9 mg po with some fair effect and received loading dose #1 (234mg IM) on 11/30.  Last dose of po was 11/29.  Invega 156mg IM tomorrow.   1. Continue inpatient hospitalization for stabilization and safety.  2.  invega sustenna 234mg IM 11/30; dcd paliperidone 9mg at bedtime last dose 11/29 pm  3. 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 tolerating invega well, received sustenna dose #1 11/30 as above, continue to monitor

## 2022-12-08 PROCEDURE — 99231 SBSQ HOSP IP/OBS SF/LOW 25: CPT

## 2022-12-08 RX ADMIN — PALIPERIDONE 156 MILLIGRAM(S): 1.5 TABLET, EXTENDED RELEASE ORAL at 13:43

## 2022-12-08 NOTE — BH INPATIENT PSYCHIATRY PROGRESS NOTE - OTHER
neutral to euthymic although less irritable there is a bit of an edge to him and he is very focused on discharge and trying to expedite it "good" guarded Nondenominational preoccupations/delusions on more extensive probing less preoccupied with discharge

## 2022-12-08 NOTE — BH INPATIENT PSYCHIATRY PROGRESS NOTE - NSBHASSESSSUMMFT_PSY_ALL_CORE
26 yr old male, single, unemployed, and domiciled with parents. with hx of psychosis, non adherent to meds and psych aftercare, with multiple previous inpatient admissions.  he has carried various diagnoses including cannabis induced psychotic disorder and schizotypal personality disorder and recent r/o schizophrenia.  Family report a history of becoming more isolative and decreased functioning since his late teenage years.  They also describe onset of Mormonism preoccupations and identifications in those years and that these constituted a difference from the beliefs of his early upbringing.  Differential again includes all three.  He is floridly psychotic.  He has poor insight and judgment and is dangerous to others.  We discussed the possibility of treatment over objection because he was noncompliant.  He started risperidone one mg at bedtime (first dose 11/4 ) titrated to 2mg at bedtime first dose 11/8.  Although he is showing some improvement and tolerating risperidone he is dangerous to others. WHe attempted to cheek risperidone 2mg last night.  We encourage higher doses of medication but he refuses despite denying side effects at this time.  Family meeting was disturbing. We continued to titrate [id number] to 4mg at bedtime.  Although he is in control on the unit and compliant with medication, he shows little insight into the disturbing nature of his behaviors and there is evidence that he remains delusional.  he agreed to switch to invega and started 9mg at bedtime first dose 11/21.  He tolerated invega 9 mg po with some fair effect and received loading dose #1 (234mg IM) on 11/30.  Last dose of po was 11/29.  He received Invega 156mg IM  12/8/2022.   1. Continue inpatient hospitalization for stabilization and safety.  2.  invega sustenns 156mg IM 12/8 (completing loading dose); invega sustenna 234mg IM 11/30; dcd paliperidone 9mg at bedtime last dose 11/29 pm  3. 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 tolerating invega well, received sustenna dose #1 11/30 as above, continue to monitor

## 2022-12-09 PROCEDURE — 99231 SBSQ HOSP IP/OBS SF/LOW 25: CPT

## 2022-12-09 NOTE — BH INPATIENT PSYCHIATRY PROGRESS NOTE - NSBHASSESSSUMMFT_PSY_ALL_CORE
26 yr old male, single, unemployed, and domiciled with parents. with hx of psychosis, non adherent to meds and psych aftercare, with multiple previous inpatient admissions.  he has carried various diagnoses including cannabis induced psychotic disorder and schizotypal personality disorder and recent r/o schizophrenia.  Family report a history of becoming more isolative and decreased functioning since his late teenage years.  They also describe onset of Quaker preoccupations and identifications in those years and that these constituted a difference from the beliefs of his early upbringing.  Differential again includes all three.  He is floridly psychotic.  He has poor insight and judgment and is dangerous to others.  We discussed the possibility of treatment over objection because he was noncompliant.  He started risperidone one mg at bedtime (first dose 11/4 ) titrated to 2mg at bedtime first dose 11/8.  Although he is showing some improvement and tolerating risperidone he is dangerous to others. WHe attempted to cheek risperidone 2mg last night.  We encourage higher doses of medication but he refuses despite denying side effects at this time.  Family meeting was disturbing. We continued to titrate [id number] to 4mg at bedtime.  Although he is in control on the unit and compliant with medication, he shows little insight into the disturbing nature of his behaviors and there is evidence that he remains delusional.  he agreed to switch to invega and started 9mg at bedtime first dose 11/21.  He tolerated invega 9 mg po with some fair effect and received loading dose #1 (234mg IM) on 11/30.  Last dose of po was 11/29.  He received Invega 156mg IM  12/8/2022.   1. Continue inpatient hospitalization for stabilization and safety.  2.  invega sustenna 156mg IM 12/8 (completing loading dose); invega sustenna 234mg IM 11/30; dcd paliperidone 9mg at bedtime last dose 11/29 pm  3. 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 tolerating invega well, received sustenna dose #1 11/30 as above, continue to monitor

## 2022-12-09 NOTE — BH INPATIENT PSYCHIATRY PROGRESS NOTE - OTHER
although less irritable there is a bit of an edge to him and he is very focused on discharge and trying to expedite it "good" guarded Shinto preoccupations/delusions on more extensive probing less preoccupied with discharge neutral to euthymic

## 2022-12-12 PROCEDURE — 99231 SBSQ HOSP IP/OBS SF/LOW 25: CPT

## 2022-12-12 NOTE — BH INPATIENT PSYCHIATRY PROGRESS NOTE - NSBHASSESSSUMMFT_PSY_ALL_CORE
26 yr old male, single, unemployed, and domiciled with parents. with hx of psychosis, non adherent to meds and psych aftercare, with multiple previous inpatient admissions.  he has carried various diagnoses including cannabis induced psychotic disorder and schizotypal personality disorder and recent r/o schizophrenia.  Family report a history of becoming more isolative and decreased functioning since his late teenage years.  They also describe onset of Nondenominational preoccupations and identifications in those years and that these constituted a difference from the beliefs of his early upbringing.  Differential again includes all three.  He is floridly psychotic.  He has poor insight and judgment and is dangerous to others.  We discussed the possibility of treatment over objection because he was noncompliant.  He started risperidone one mg at bedtime (first dose 11/4 ) titrated to 2mg at bedtime first dose 11/8.  Although he is showing some improvement and tolerating risperidone he is dangerous to others. WHe attempted to cheek risperidone 2mg last night.  We encourage higher doses of medication but he refuses despite denying side effects at this time.  Family meeting was disturbing. We continued to titrate [id number] to 4mg at bedtime.  Although he is in control on the unit and compliant with medication, he shows little insight into the disturbing nature of his behaviors and there is evidence that he remains delusional.  he agreed to switch to invega and started 9mg at bedtime first dose 11/21.  He tolerated invega 9 mg po with some fair effect and received loading dose #1 (234mg IM) on 11/30.  Last dose of po was 11/29.  He received Invega 156mg IM  12/8/2022.   1. Continue inpatient hospitalization for stabilization and safety.  2.  invega sustenna 156mg IM 12/8 (completing loading dose); invega sustenna 234mg IM 11/30; dcd paliperidone 9mg at bedtime last dose 11/29 pm  3. 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 tolerating invega well, received sustenna dose #1 11/30 as above, continue to monitor

## 2022-12-12 NOTE — BH INPATIENT PSYCHIATRY PROGRESS NOTE - NSBHFUPINTERVALHXFT_PSY_A_CORE
Staff report no interval events, compliant with meds, no prns, slept.  social. staff report at times he remarks insightfully about his previous behaviors, reportedly stating that "I cant act like that" at his family's home.  Other times he waxes Mu-ism.  Staff asks if he would like more tia material and he relates, "I've got the word,"  Noted in group to relate at one poin that he wished that he could be god and relates, "How do you know that job is not taken,"

## 2022-12-12 NOTE — BH INPATIENT PSYCHIATRY PROGRESS NOTE - OTHER
neutral to euthymic although less irritable there is a bit of an edge to him and he is very focused on discharge and trying to expedite it Islam preoccupations/delusions on more extensive probing less preoccupied with discharge "good" guarded

## 2022-12-13 PROCEDURE — 99231 SBSQ HOSP IP/OBS SF/LOW 25: CPT

## 2022-12-13 NOTE — BH INPATIENT PSYCHIATRY PROGRESS NOTE - NSBHASSESSSUMMFT_PSY_ALL_CORE
26 yr old male, single, unemployed, and domiciled with parents. with hx of psychosis, non adherent to meds and psych aftercare, with multiple previous inpatient admissions.  he has carried various diagnoses including cannabis induced psychotic disorder and schizotypal personality disorder and recent r/o schizophrenia.  Family report a history of becoming more isolative and decreased functioning since his late teenage years.  They also describe onset of Latter day preoccupations and identifications in those years and that these constituted a difference from the beliefs of his early upbringing.  Differential again includes all three.  He is floridly psychotic.  He has poor insight and judgment and is dangerous to others.  We discussed the possibility of treatment over objection because he was noncompliant.  He started risperidone one mg at bedtime (first dose 11/4 ) titrated to 2mg at bedtime first dose 11/8.  Although he is showing some improvement and tolerating risperidone he is dangerous to others. WHe attempted to cheek risperidone 2mg last night.  We encourage higher doses of medication but he refuses despite denying side effects at this time.  Family meeting was disturbing. We continued to titrate [id number] to 4mg at bedtime.  Although he is in control on the unit and compliant with medication, he shows little insight into the disturbing nature of his behaviors and there is evidence that he remains delusional.  he agreed to switch to invega and started 9mg at bedtime first dose 11/21.  He tolerated invega 9 mg po with some fair effect and received loading dose #1 (234mg IM) on 11/30.  Last dose of po was 11/29.  He received Invega 156mg IM  12/8/2022.   1. Continue inpatient hospitalization for stabilization and safety.  2.  invega sustenna 156mg IM 12/8 (completing loading dose); invega sustenna 234mg IM 11/30; dcd paliperidone 9mg at bedtime last dose 11/29 pm  3. 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 tolerating invega well, received sustenna dose #1 11/30 as above, continue to monitor

## 2022-12-13 NOTE — BH INPATIENT PSYCHIATRY PROGRESS NOTE - NSBHFUPINTERVALHXFT_PSY_A_CORE
Staff report no interval events, compliant with meds, no prns, slept.  social. gabrielle review aot treatment plan with patient and m[facility]ls tomorrow.

## 2022-12-13 NOTE — BH INPATIENT PSYCHIATRY PROGRESS NOTE - OTHER
Muslim preoccupations/delusions on more extensive probing less preoccupied with discharge neutral to euthymic "okay" guarded although less irritable there is a bit of an edge to him and he is very focused on discharge and trying to expedite it

## 2022-12-14 PROCEDURE — 99231 SBSQ HOSP IP/OBS SF/LOW 25: CPT

## 2022-12-14 NOTE — BH INPATIENT PSYCHIATRY PROGRESS NOTE - NSBHFUPINTERVALHXFT_PSY_A_CORE
Staff report no interval events, compliant with meds, no prns, slept.  reviewed treatment plan with patient and mhls.  anticipate court date 12/13.

## 2022-12-14 NOTE — BH INPATIENT PSYCHIATRY PROGRESS NOTE - NSBHASSESSSUMMFT_PSY_ALL_CORE
26 yr old male, single, unemployed, and domiciled with parents. with hx of psychosis, non adherent to meds and psych aftercare, with multiple previous inpatient admissions.  he has carried various diagnoses including cannabis induced psychotic disorder and schizotypal personality disorder and recent r/o schizophrenia.  Family report a history of becoming more isolative and decreased functioning since his late teenage years.  They also describe onset of Jehovah's witness preoccupations and identifications in those years and that these constituted a difference from the beliefs of his early upbringing.  Differential again includes all three.  He is floridly psychotic.  He has poor insight and judgment and is dangerous to others.  We discussed the possibility of treatment over objection because he was noncompliant.  He started risperidone one mg at bedtime (first dose 11/4 ) titrated to 2mg at bedtime first dose 11/8.  Although he is showing some improvement and tolerating risperidone he is dangerous to others. WHe attempted to cheek risperidone 2mg last night.  We encourage higher doses of medication but he refuses despite denying side effects at this time.  Family meeting was disturbing. We continued to titrate [id number] to 4mg at bedtime.  Although he is in control on the unit and compliant with medication, he shows little insight into the disturbing nature of his behaviors and there is evidence that he remains delusional.  he agreed to switch to invega and started 9mg at bedtime first dose 11/21.  He tolerated invega 9 mg po with some fair effect and received loading dose #1 (234mg IM) on 11/30.  Last dose of po was 11/29.  He received Invega 156mg IM  12/8/2022.   1. Continue inpatient hospitalization for stabilization and safety.  2.  invega sustenna 156mg IM 12/8 (completing loading dose); invega sustenna 234mg IM 11/30; dcd paliperidone 9mg at bedtime last dose 11/29 pm  3. 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 tolerating invega well, received sustenna dose #1 11/30 as above, continue to monitor   26 yr old male, single, unemployed, and domiciled with parents. with hx of psychosis, non adherent to meds and psych aftercare, with multiple previous inpatient admissions.  he has carried various diagnoses including cannabis induced psychotic disorder and schizotypal personality disorder and recent r/o schizophrenia.  Family report a history of becoming more isolative and decreased functioning since his late teenage years.  They also describe onset of Voodoo preoccupations and identifications in those years and that these constituted a difference from the beliefs of his early upbringing.  Differential again includes all three.  He is floridly psychotic.  He has poor insight and judgment and is dangerous to others.  We discussed the possibility of treatment over objection because he was noncompliant.  He started risperidone one mg at bedtime (first dose 11/4 ) titrated to 2mg at bedtime first dose 11/8.  Although he is showing some improvement and tolerating risperidone he is dangerous to others. WHe attempted to cheek risperidone 2mg last night.  We encourage higher doses of medication but he refuses despite denying side effects at this time.  Family meeting was disturbing. We continued to titrate [id number] to 4mg at bedtime.  Although he is in control on the unit and compliant with medication, he shows little insight into the disturbing nature of his behaviors and there is evidence that he remains delusional.  he agreed to switch to invega and started 9mg at bedtime first dose 11/21.  He tolerated invega 9 mg po with some fair effect and received loading dose #1 (234mg IM) on 11/30.  Last dose of po was 11/29.  He received Invega 156mg IM  12/8/2022.   Anticipate court date 12/20  1. Continue inpatient hospitalization for stabilization and safety.  2.  invega sustenna 156mg IM 12/8 (completing loading dose); invega sustenna 234mg IM 11/30; dcd paliperidone 9mg at bedtime last dose 11/29 pm  3. 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 tolerating invega well, received sustenna dose #1 11/30 as above, continue to monitor

## 2022-12-14 NOTE — BH INPATIENT PSYCHIATRY PROGRESS NOTE - OTHER
Cheondoism preoccupations/delusions on more extensive probing neutral to euthymic although less irritable there is a bit of an edge to him and he is very focused on discharge and trying to expedite it "okay" guarded less preoccupied with discharge

## 2022-12-15 PROCEDURE — 99231 SBSQ HOSP IP/OBS SF/LOW 25: CPT

## 2022-12-15 NOTE — ED PROVIDER NOTE - NS ED MD EM SELECTION
47616 Comprehensive Cephalexin Pregnancy And Lactation Text: This medication is Pregnancy Category B and considered safe during pregnancy.  It is also excreted in breast milk but can be used safely for shorter doses.

## 2022-12-15 NOTE — BH INPATIENT PSYCHIATRY PROGRESS NOTE - NSBHASSESSSUMMFT_PSY_ALL_CORE
26 yr old male, single, unemployed, and domiciled with parents. with hx of psychosis, non adherent to meds and psych aftercare, with multiple previous inpatient admissions.  he has carried various diagnoses including cannabis induced psychotic disorder and schizotypal personality disorder and recent r/o schizophrenia.  Family report a history of becoming more isolative and decreased functioning since his late teenage years.  They also describe onset of Episcopal preoccupations and identifications in those years and that these constituted a difference from the beliefs of his early upbringing.  Differential again includes all three.  He is floridly psychotic.  He has poor insight and judgment and is dangerous to others.  We discussed the possibility of treatment over objection because he was noncompliant.  He started risperidone one mg at bedtime (first dose 11/4 ) titrated to 2mg at bedtime first dose 11/8.  Although he is showing some improvement and tolerating risperidone he is dangerous to others. WHe attempted to cheek risperidone 2mg last night.  We encourage higher doses of medication but he refuses despite denying side effects at this time.  Family meeting was disturbing. We continued to titrate [id number] to 4mg at bedtime.  Although he is in control on the unit and compliant with medication, he shows little insight into the disturbing nature of his behaviors and there is evidence that he remains delusional.  he agreed to switch to invega and started 9mg at bedtime first dose 11/21.  He tolerated invega 9 mg po with some fair effect and received loading dose #1 (234mg IM) on 11/30.  Last dose of po was 11/29.  He received Invega 156mg IM  12/8/2022.   Anticipate court date 12/20  1. Continue inpatient hospitalization for stabilization and safety.  2.  invega sustenna 156mg IM 12/8 (completing loading dose); invega sustenna 234mg IM 11/30; dcd paliperidone 9mg at bedtime last dose 11/29 pm  3. 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 tolerating invega well, received sustenna dose #1 11/30 as above, continue to monitor

## 2022-12-15 NOTE — BH INPATIENT PSYCHIATRY PROGRESS NOTE - OTHER
"alright" less preoccupied with discharge Orthodoxy preoccupations/delusions on more extensive probing neutral to euthymic guarded

## 2022-12-16 PROCEDURE — 99231 SBSQ HOSP IP/OBS SF/LOW 25: CPT

## 2022-12-16 NOTE — BH INPATIENT PSYCHIATRY PROGRESS NOTE - NSBHASSESSSUMMFT_PSY_ALL_CORE
26 yr old male, single, unemployed, and domiciled with parents. with hx of psychosis, non adherent to meds and psych aftercare, with multiple previous inpatient admissions.  he has carried various diagnoses including cannabis induced psychotic disorder and schizotypal personality disorder and recent r/o schizophrenia.  Family report a history of becoming more isolative and decreased functioning since his late teenage years.  They also describe onset of Yazidism preoccupations and identifications in those years and that these constituted a difference from the beliefs of his early upbringing.  Differential again includes all three.  He is floridly psychotic.  He has poor insight and judgment and is dangerous to others.  We discussed the possibility of treatment over objection because he was noncompliant.  He started risperidone one mg at bedtime (first dose 11/4 ) titrated to 2mg at bedtime first dose 11/8.  Although he is showing some improvement and tolerating risperidone he is dangerous to others. WHe attempted to cheek risperidone 2mg last night.  We encourage higher doses of medication but he refuses despite denying side effects at this time.  Family meeting was disturbing. We continued to titrate [id number] to 4mg at bedtime.  Although he is in control on the unit and compliant with medication, he shows little insight into the disturbing nature of his behaviors and there is evidence that he remains delusional.  he agreed to switch to invega and started 9mg at bedtime first dose 11/21.  He tolerated invega 9 mg po with some fair effect and received loading dose #1 (234mg IM) on 11/30.  Last dose of po was 11/29.  He received Invega 156mg IM  12/8/2022.   Anticipate court date 12/20  1. Continue inpatient hospitalization for stabilization and safety.  2.  invega sustenna 156mg IM 12/8 (completing loading dose); invega sustenna 234mg IM 11/30; dcd paliperidone 9mg at bedtime last dose 11/29 pm  3. 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 tolerating invega well, received sustenna dose #1 11/30 as above, continue to monitor

## 2022-12-16 NOTE — BH TREATMENT PLAN - NSTXPATIENTPARTICIPATE_PSY_ALL_CORE
Patient participated in identification of needs/problems/goals for treatment
Patient participated in identification of needs/problems/goals for treatment
No, patient unwilling to participate
Patient participated in identification of needs/problems/goals for treatment

## 2022-12-16 NOTE — BH TREATMENT PLAN - NSTXPLANTHERAPYSESSIONSFT_PSY_ALL_CORE
12-11-22  Type of therapy: Cognitive behavior therapy,Coping skills,Creative arts therapy,Leisure development,Music therapy  Type of session: Individual  Level of patient participation: Attentive,Engaged,Participates  Duration of participation: 15 minutes  Therapy conducted by: Psych rehab  Therapy Summary: Writer met with pt to discuss and assess the pt's progress towards specified psychiatric rehabilitaiton goal. On approach, the pt was making a bracelet that he had started during the group process and was reluctant to stepping away from his activity at the moment. Writer and pt still able to engage in a brief 1:1 meeting to discuss goal progress and treatment plan.     Pt currently making some improvements towards goal as he has started to exhibit more receptiveness to the medications. The pt is compliant with daily medications with good effect. Pt sharing that he agreed to take HEALY without needing court order. Pt continues to engage in conversations about his behavior leading up to his admission and how he plans to continue to remain in good behavioral control. Throughout stay, pt has maintained great behavioral control on the unit.     Pt encouraged to continue to  identify the benefits of medication compliance as currently the pt does not seem to regard much of his progress to the medications. During group today the pt reported that he can only rely on "himself" and when asked who does he look up to he reported "myself." Pt stated in group that he would like to be "God" and when peers shared that this role is already taken the pt reported, "no it isn't." Although pt exhibiting some improvements as he is not calling himself God currently, the pt still seems to have illogical thoughts surrounding Synagogue and his role within his Syn[employer]. Pt denies SI/HI/I/P and AH/VH/TH. Will conitnue to support the pt during the current stay.  
  11-07-22  Type of therapy: Cognitive behavior therapy,Coping skills,Creative arts therapy,Leisure development,Music therapy,Peer advocate,Spirituality  Type of session: Individual  Level of patient participation: not talkative  Duration of participation: Less than 15 minutes  Therapy conducted by: Psych rehab  Therapy Summary: Writer met with pt to disucss and assess the pt's progress towards specified psychiatric rehabilitation goal this week however pt reported, "I dont really have anything to talk about and nothing is new." Therefore, pt did not wish to meet with writer on a 1:1 basis. Pt did however thank writer for "checking on me." On approach, the pt was lying in personal bed reading the Bible.     At this time, the pt is making slight improvements towards goal of demonstrating daily medication and treatment compliance as he was able to begin medications this week with encouragement although having much resistance initially to the medications. Pt currently started taking one milligram of risperdol and is hesitant to agree to medication adjustments today. Pt presents with illogical and delusional thoughts and is religiously preoccupied. Pt observed with an odd facial affect and making bizarre comments. Pt attends about 70% of offered groups and enjoys fresh air breaks and music. Pt visible and active in the therapeutic milieu. Pt maintains daily ADLs on a fair basis. Pt denies SI/HI/I/P and AH/VH/TH. Will continue to support the pt during the current stay.  
  11-14-22  Type of therapy: Cognitive behavior therapy,Coping skills,Creative arts therapy,Leisure development,Music therapy,Peer advocate,Spirituality  Type of session: Individual  Level of patient participation: Attentive,Engaged,Participates  Duration of participation: Less than 15 minutes  Therapy conducted by: Psych rehab  Therapy Summary: Writer met with pt to discuss and assess the pt's progress towards specified psychiatric rehabilitation goal this week. On approach, pt in the day room socializing appropriately with peers. Pt agreeable to meet however did not wish to leave the day area where he was amongst his peers. Pt reported no issues and reported that he is hoping to go home soon. Pt not perseverative on discharge, but hopeful. During disucssion, the pt presented as linear and appropriate. The pt did not appear religiously preoccupied. The pt denied SI/HI/I/P and AH/VH/TH. The pt reported that the groups are helpful to him and asked writer if she could run another movie group and gave suggestions for some movies he would like to watch with his peers during stay. Writer and pt had a good discussion regarding his group participation. Pt reported his likes for music, art, and entertainment and was able to identify these as coping skills. Pt making some improvements towards specified psychiatric rehabilitation treatment goal of demonstrating daily compliance with medications as he has demonstrated compliance this week. However, the pt continues to express his dislike with the medication regiment and per collateral obtained by nursing staff today, the pt was hesitant to take his medications earlier today and required extra support and encouragement - pt compliant. Pt appears well kept and maintains ADLs independently on a fair basis. Pt visible and social in the community with good effect. Will continue to support the pt during the current stay.  
  11-27-22  Type of therapy: Coping skills,Leisure development  Type of session: Individual  Level of patient participation: Attentive,Engaged,Participates  Duration of participation: 20 minutes  Therapy conducted by: Psych rehab  Therapy Summary: Writer met with pt to disucss and assess the pt's progress towards specified psychiatric rehabilitation goal this week. On approach, the pt was lying in personal bed and reported that he was "trying to rest." Pt was agreeable to speak with writer 1:1 and was more forthcoming with personal hx than usual.     Pt making some improvement towards specified treatment goal as he was able to share that he "doesn't want to go to court" and instead would like to "take the HEALY" without needing to go to court. Pt reported that he thinks the "HEALY might be better for me."     Today, writer and pt engaged in discussion surrounding reasons that the pt continues to come into the hospital for psychiatric hospitalization. At first, the pt reported that he thinks it's because "I don't have a job and while everyone is at work I am doing things like walking the dog and not getting anywhere." When writer noted the pt's reasoning and validated that he was sharing that it seems that he is lacking "purpose" the pt agreed. Writer asked the question again based on this new premise and the pt responded, "well, I guess it's because of my behaviors sometimes getting too out of hand. I need to learn to use my words more and not just act out." Through this, the pt demonstrated some insight and improved judgement and empathy. During discussion with writer, pt has not mentioned that his actions leading up to admission were negatively impacting him up until this point. This displays some improvemetns.     Pt denies SI/HI/I/P and AH/VH/TH. The pt presents with fair ADLs and behavioral control on the unit. The pt attends leisure and coping groups daily (about 1-2 groups per day). Will continue to support the pt towards goal progress during the current stay.  
  12-04-22  Type of therapy: Cognitive behavior therapy,Coping skills,Medication management,Step change,Symptom management  Type of session: Individual  Level of patient participation: Engaged  Duration of participation: 30 minutes  Therapy conducted by: Psych rehab  Therapy Summary: Writer met with pt to discuss and assess the pt's progress towards specified psychiatric rehabilitation goal this week. On approach, the pt was watching the soccer World Cup and was reluctant to break for a session. Pt was amenable after compromising.  Pt stated he is making progress with his anger and not acting out physically. Writer and pt discussed processing the emotions first before any impulsive act; using opposite action to achieve desirable outcome. Other coping skills include talking it out and journaling. Writer discussed and gave pt a handout on problem solving and setting SMART goals to move his life forward and not feeling stuck. Pt was receptive. Pt is amenable to HEALY and staying adherent with medication post discharge. Mood is good; affect is brighter per nursing.    Through this, the pt demonstrated some insight and improved judgement and empathy. Pt denies SI/HI/I/P and AH/VH/TH. The pt presents with fair ADLs and behavioral control on the unit. Pt [id number] he is attending group therapy regularly.

## 2022-12-16 NOTE — BH TREATMENT PLAN - ANXIETY/PANIC/FEAR NURSING INTERVENTIONS/RECOMMENDATIONS
Monitor for signs of anxiety  Maintain calm environment with minimal distractions  Provide reassurance and comfort measures  Encourage patient participation in relaxation exercises  Encourage patient to utilize coping skills
Purposeful nursing rounding qshift, encourage use of coping skills, & provide PRNs for anxiety as necessary.
Monitor for signs of anxiety  Maintain calm environment with minimal distractions  Provide reassurance and comfort measures  Encourage patient participation in relaxation exercises  Encourage patient to utilize coping skills

## 2022-12-16 NOTE — BH TREATMENT PLAN - NSCMSPTSTRENGTHS_PSY_ALL_CORE
Supportive family
Expressive of emotions/Humor
Supportive family
Humor
Supportive family

## 2022-12-16 NOTE — BH TREATMENT PLAN - NSTXDCOPNOGOAL_PSY_ALL_CORE
Will agree to participate in appropriate outpatient care
Other...
Will agree to participate in appropriate outpatient care
Will agree to participate in appropriate outpatient care

## 2022-12-16 NOTE — BH TREATMENT PLAN - NSTXDCOPNOINTERSW_PSY_ALL_CORE
Unit SW provided insight, support, and psycho-education while maintaining contact with identified supports.
Unit SW provided insight, support, and psycho-education while maintaining contact with identified supports.
SW will provide support, insight, discharge planning, psycho-education, and maintain contact with identified supports.
Unit SW provided insight, support, and psycho-education while maintaining contact with identified supports.

## 2022-12-16 NOTE — BH INPATIENT PSYCHIATRY PROGRESS NOTE - NSBHFUPINTERVALHXFT_PSY_A_CORE
Staff report no interval events, compliant with meds, no prns, slept.  Discussed case with mother and patient.

## 2022-12-16 NOTE — BH TREATMENT PLAN - NSPTSTATEDGOAL_PSY_ALL_CORE
"to get discahrged"
"to be discharged and go home"
"to get out of here"
"TO GET OUT OF HERE"	
"to go home"
"They will take me back in"
"I want to go back to my parents"

## 2022-12-16 NOTE — BH TREATMENT PLAN - NSTXANXDATEEST_PSY_ALL_CORE
HISTORY:   Mena is a 55 Hashimoto's thyroiditis.  2/2017 
initiation of levothyroxine     • Status post partial thyroidectomy 2017     Priority: Low   • Myalgia 2016     Priority: Low   • Chronic fatigue 2016     Priority: Low   • Bell's palsy 2015 classic right sided symptoms  2016 recurrence of symptoms.  She has residual right sided facial numbness and weakness, right sided hearing loss.      • Anxiety and depression 2014 had anxiety and panic; placed on escitalopram but she took this for 1 month then self DCed.       • Asthma, mild intermittent    • Obesity, Class I, BMI 30-34.9      145/174     • Tobacco use disorder    • Allergic rhinitis, cause unspecified    • OSTEOARTHRITIS UNSPECIFIED TYPE( Site Unspecified)        Past Medical History:   Diagnosis Date   • Allergic rhinitis, cause unspecified    • Asthma, mild intermittent    • Bell's palsy     Right facial weakness-  recurrent 2017   • Dysthymia     post partum    • Hashimoto's thyroiditis 2017   • Heterozygous factor V Leiden mutation (CMS/HCC)    • Nasal septal deviation    • NEUROPATHY (ENTRAPMENT) ULNAR NERVE 2004    left   • Overweight     Borderline mild obesity-BMI 30   • Patient request for diagnostic testing 2017   • Thyroid nodule     biopsy -colloid, biopsy 2017 colloid nodule   • Tobacco use disorder     chronic smoking       Past Surgical History:   Procedure Laterality Date   •  SECTION, LOW TRANSVERSE       X 3   • OPEN RX NASAL SEPTAL FRACTURE  1980    repair   • SPIROMETRY  10/08/2008    normal (Dr Medina)   • THYROIDECTOMY, PARTIAL  2017   • TUBAL LIGATION  1994    Tubal Ligation   • UPPER GASTROINTESTINAL ENDOSCOPY  11    Dr. Walker, Esophagitis/Gastritis/Neg Hpylori       ALLERGIES:  No Known Allergies    Current Outpatient Prescriptions   Medication Sig   • aspirin 81 MG tablet Take 1 tablet by mouth daily.   • buPROPion (WELLBUTRIN XL) 300 MG 24 hr 
01-Nov-2022
01-Nov-2022
15-Dec-2022

## 2022-12-16 NOTE — BH TREATMENT PLAN - NSBHPRIMARYDX_PSY_ALL_CORE
Schizophrenia spectrum and other psychotic disorders    

## 2022-12-16 NOTE — BH TREATMENT PLAN - NSTXMEDICINTERPR_PSY_ALL_CORE
pt making slight improvements towards goal as he was able to begin medications this week with encouragement although having much resistance initially to the medications. Will continue to support the pt during the current stay.
Continue to identify the benefits of medication compliance.
Pt making some improvement towards specified treatment goal as he was able to share that he "doesn't want to go to court" and instead would like to "take the HEALY" without needing to go to court. Pt reported that he thinks the "HEALY might be better for me." Will continue to support the pt towards goal progress during the current stay.
Over the next 7 days, psychiatric rehabilitation staff will continue to support the pt towards specified psychiatric rehabilitation goals.
Pt making some improvements towards goal as he has started to exhibit more receptiveness to the medications. The pt is compliant with daily medications with good effect. Pt sharing that he agreed to take HEALY without needing court order. Pt encouraged to continue to  identify the benefits of medication compliance as currently the does not seem to regard much of his progress to the medications. Will conitnue to support the pt during the current stay.
Over the next 7 days, psychiatric rehabilitation staff will continue to support the pt towards specified psychiatric rehabilitation goals.
Pt making some improvements towards specified psychiatric rehabilitation treatment goal of demonstrating daily compliance with medications as he has demonstrated compliance this week. However, the pt continues to express his dislike with the medication regiment and per collateral obtained by nursing staff today, the pt was hesitant to take his medications earlier today and required extra support and encouragement. Will continue to support.

## 2022-12-16 NOTE — BH INPATIENT PSYCHIATRY PROGRESS NOTE - OTHER
Voodoo preoccupations/delusions on more extensive probing less preoccupied with discharge "good" guarded neutral to euthymic

## 2022-12-16 NOTE — BH TREATMENT PLAN - NSTXPLANSTARTDATE_PSY_ALL_CORE
02-Nov-2022 14:07
10-Nov-2022 09:01
16-Dec-2022 17:57
17-Nov-2022 11:44
03-Nov-2022 08:06
09-Dec-2022 10:04
02-Dec-2022 08:50

## 2022-12-19 PROCEDURE — 99231 SBSQ HOSP IP/OBS SF/LOW 25: CPT

## 2022-12-19 NOTE — BH INPATIENT PSYCHIATRY PROGRESS NOTE - NSBHASSESSSUMMFT_PSY_ALL_CORE
26 yr old male, single, unemployed, and domiciled with parents. with hx of psychosis, non adherent to meds and psych aftercare, with multiple previous inpatient admissions.  he has carried various diagnoses including cannabis induced psychotic disorder and schizotypal personality disorder and recent r/o schizophrenia.  Family report a history of becoming more isolative and decreased functioning since his late teenage years.  They also describe onset of Orthodoxy preoccupations and identifications in those years and that these constituted a difference from the beliefs of his early upbringing.  Differential again includes all three.  He is floridly psychotic.  He has poor insight and judgment and is dangerous to others.  We discussed the possibility of treatment over objection because he was noncompliant.  He started risperidone one mg at bedtime (first dose 11/4 ) titrated to 2mg at bedtime first dose 11/8.  Although he is showing some improvement and tolerating risperidone he is dangerous to others. WHe attempted to cheek risperidone 2mg last night.  We encourage higher doses of medication but he refuses despite denying side effects at this time.  Family meeting was disturbing. We continued to titrate [id number] to 4mg at bedtime.  Although he is in control on the unit and compliant with medication, he shows little insight into the disturbing nature of his behaviors and there is evidence that he remains delusional.  he agreed to switch to invega and started 9mg at bedtime first dose 11/21.  He tolerated invega 9 mg po with some fair effect and received loading dose #1 (234mg IM) on 11/30.  Last dose of po was 11/29.  He received Invega 156mg IM  12/8/2022.   Anticipate court date 12/20  1. Continue inpatient hospitalization for stabilization and safety.  2. next invega sustenna 156mg im due January 5 or 6 2023.   invega sustenna 156mg IM 12/8 (completing loading dose); invega sustenna 234mg IM 11/30; dcd paliperidone 9mg at bedtime last dose 11/29 pm  3. 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 tolerating invega well, received sustenna dose #1 11/30 as above, continue to monitor

## 2022-12-19 NOTE — BH INPATIENT PSYCHIATRY PROGRESS NOTE - OTHER
neutral to euthymic "good" Religion preoccupations/delusions on more extensive probing guarded less preoccupied with discharge

## 2022-12-19 NOTE — BH INPATIENT PSYCHIATRY PROGRESS NOTE - ADDITIONAL DETAILS / COMMENTS
patient is superficially cooperative, bizarre and Taoism preoccupied.
patient is superficially cooperative, bizarre and Orthodox preoccupied.
patient is superficially cooperative, bizarre and Zoroastrian preoccupied.
patient is superficially cooperative, bizarre and Hoahaoism preoccupied.
patient is superficially cooperative, bizarre and Restoration preoccupied.
patient is superficially cooperative, bizarre and Hindu preoccupied.
patient is superficially cooperative, bizarre and Moravian preoccupied.
patient is superficially cooperative, bizarre and Taoism preoccupied.
patient is superficially cooperative, bizarre and Taoist preoccupied.
patient is superficially cooperative, bizarre and Catholic preoccupied.
patient is superficially cooperative, bizarre and Church preoccupied.
patient is superficially cooperative, bizarre and Christianity preoccupied.
patient is superficially cooperative, bizarre and Quaker preoccupied.
patient is superficially cooperative, bizarre and Adventism preoccupied.
patient is superficially cooperative, bizarre and Restoration preoccupied.
patient is superficially cooperative, bizarre and Sikhism preoccupied.
patient is superficially cooperative, bizarre and Christian preoccupied.
patient is superficially cooperative, bizarre and Catholic preoccupied.
patient is superficially cooperative, bizarre and Latter day preoccupied.
patient is superficially cooperative, bizarre and Oriental orthodox preoccupied.
patient is superficially cooperative, bizarre and Caodaism preoccupied.
patient is superficially cooperative, bizarre and Druze preoccupied.
patient is superficially cooperative, bizarre and Restoration preoccupied.
patient is superficially cooperative, bizarre and Congregational preoccupied.
patient is superficially cooperative, bizarre and Taoist preoccupied.
patient is superficially cooperative, bizarre and Scientologist preoccupied.
patient is superficially cooperative, bizarre and Jainism preoccupied.
patient is superficially cooperative, bizarre and Hoahaoism preoccupied.
patient is superficially cooperative, bizarre and Jainism preoccupied.
patient is superficially cooperative, bizarre and Samaritan preoccupied.
patient is superficially cooperative, bizarre and Cheondoism preoccupied.
patient is superficially cooperative, bizarre and Mosque preoccupied.
patient is superficially cooperative, bizarre and Denominational preoccupied.
patient is superficially cooperative, bizarre and Zoroastrian preoccupied.

## 2022-12-19 NOTE — BH INPATIENT PSYCHIATRY PROGRESS NOTE - NSBHFUPINTERVALHXFT_PSY_A_CORE
Staff report no interval events, compliant with meds, no prns, slept.  AOT hearing and discharge likely tomorrow.

## 2022-12-20 VITALS — TEMPERATURE: 98 F

## 2022-12-20 PROCEDURE — 99239 HOSP IP/OBS DSCHRG MGMT >30: CPT

## 2022-12-20 RX ORDER — PALIPERIDONE 1.5 MG/1
156 TABLET, EXTENDED RELEASE ORAL
Qty: 1 | Refills: 0
Start: 2022-12-20 | End: 2023-01-18

## 2022-12-20 RX ORDER — PALIPERIDONE 1.5 MG/1
156 TABLET, EXTENDED RELEASE ORAL
Qty: 0 | Refills: 0 | DISCHARGE

## 2022-12-20 NOTE — BH INPATIENT PSYCHIATRY PROGRESS NOTE - LEVEL OF CONSCIOUSNESS
Alert

## 2022-12-20 NOTE — BH INPATIENT PSYCHIATRY PROGRESS NOTE - NSBHMSEAFFCONG_PSY_A_CORE
Congruent
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
## 2022-12-20 NOTE — BH INPATIENT PSYCHIATRY PROGRESS NOTE - NSTXANXDATEEST_PSY_ALL_CORE
15-Dec-2022
01-Nov-2022
15-Dec-2022
01-Nov-2022
15-Dec-2022
01-Nov-2022
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
## 2022-12-20 NOTE — BH INPATIENT PSYCHIATRY PROGRESS NOTE - NSTXANXPROGRES_PSY_ALL_CORE
Improving
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
## 2022-12-20 NOTE — BH INPATIENT PSYCHIATRY PROGRESS NOTE - NSBHMSETHTCONTENT_PSY_A_CORE
Delusions
Other
Delusions
Other
Other
Delusions
Other
Delusions
Unremarkable/Other
Unremarkable/Other
Other
Unremarkable/Other
Delusions
Other
Other
Delusions
Delusions
Other
Delusions
Other
Delusions
Other
Unremarkable/Other
Delusions
Delusions
Other
Other
Unremarkable/Other
Delusions
Delusions
Other
Other

## 2022-12-20 NOTE — BH INPATIENT PSYCHIATRY PROGRESS NOTE - NSCGISEVERILLNESS_PSY_ALL_CORE
5 = Markedly ill - intrusive symptoms that distinctly impair social/occupational function or cause intrusive levels of distress
4 = Moderately ill – overt symptoms causing noticeable, but modest, functional impairment or distress; symptom level may warrant medication
4 = Moderately ill – overt symptoms causing noticeable, but modest, functional impairment or distress; symptom level may warrant medication
5 = Markedly ill - intrusive symptoms that distinctly impair social/occupational function or cause intrusive levels of distress
4 = Moderately ill – overt symptoms causing noticeable, but modest, functional impairment or distress; symptom level may warrant medication
5 = Markedly ill - intrusive symptoms that distinctly impair social/occupational function or cause intrusive levels of distress
6 = Severely ill - disruptive pathology, behavior and function are frequently influenced by symptoms, may require assistance from others
5 = Markedly ill - intrusive symptoms that distinctly impair social/occupational function or cause intrusive levels of distress

## 2022-12-20 NOTE — BH INPATIENT PSYCHIATRY PROGRESS NOTE - NSTXMEDICDATETRGT_PSY_ALL_CORE
14-Nov-2022
14-Nov-2022
11-Dec-2022
14-Nov-2022
04-Dec-2022
21-Nov-2022
08-Nov-2022
14-Nov-2022
18-Dec-2022
11-Dec-2022
18-Dec-2022
08-Nov-2022
20-Dec-2022
11-Dec-2022
28-Nov-2022
04-Dec-2022
04-Dec-2022
28-Nov-2022
21-Nov-2022
28-Nov-2022
18-Dec-2022
25-Dec-2022
04-Dec-2022
11-Dec-2022
08-Nov-2022
21-Nov-2022
22-Dec-2022
18-Dec-2022
08-Nov-2022
08-Nov-2022
04-Dec-2022
11-Dec-2022
08-Nov-2022
14-Nov-2022

## 2022-12-20 NOTE — BH INPATIENT PSYCHIATRY PROGRESS NOTE - NSBHMSEBODY_PSY_A_CORE
Average build

## 2022-12-20 NOTE — BH INPATIENT PSYCHIATRY PROGRESS NOTE - PRN MEDS
MEDICATIONS  (PRN):  OLANZapine 5 milliGRAM(s) Oral every 6 hours PRN agitation  OLANZapine Injectable 5 milliGRAM(s) IntraMuscular Once PRN severe psychotic agitation  
MEDICATIONS  (PRN):  diphenhydrAMINE Injectable 50 milliGRAM(s) IntraMuscular once PRN for acute agitation related to psychosis or dystonic reaction  OLANZapine 5 milliGRAM(s) Oral every 6 hours PRN agitation  OLANZapine Injectable 5 milliGRAM(s) IntraMuscular Once PRN severe psychotic agitation  
MEDICATIONS  (PRN):  OLANZapine 5 milliGRAM(s) Oral every 6 hours PRN agitation  OLANZapine Injectable 5 milliGRAM(s) IntraMuscular Once PRN severe psychotic agitation  
MEDICATIONS  (PRN):  diphenhydrAMINE Injectable 50 milliGRAM(s) IntraMuscular once PRN for acute agitation related to psychosis or dystonic reaction  OLANZapine 5 milliGRAM(s) Oral every 6 hours PRN agitation  OLANZapine Injectable 5 milliGRAM(s) IntraMuscular Once PRN severe psychotic agitation  
MEDICATIONS  (PRN):  OLANZapine 5 milliGRAM(s) Oral every 6 hours PRN agitation  OLANZapine Injectable 5 milliGRAM(s) IntraMuscular Once PRN severe psychotic agitation  
MEDICATIONS  (PRN):  diphenhydrAMINE Injectable 50 milliGRAM(s) IntraMuscular once PRN for acute agitation related to psychosis or dystonic reaction  OLANZapine 5 milliGRAM(s) Oral every 6 hours PRN agitation  OLANZapine Injectable 5 milliGRAM(s) IntraMuscular Once PRN severe psychotic agitation  
MEDICATIONS  (PRN):  LORazepam     Tablet 2 milliGRAM(s) Oral four times a day PRN severe anxiety related to psychosis  OLANZapine 5 milliGRAM(s) Oral every 6 hours PRN agitation  OLANZapine Injectable 5 milliGRAM(s) IntraMuscular Once PRN severe psychotic agitation  
MEDICATIONS  (PRN):  diphenhydrAMINE Injectable 50 milliGRAM(s) IntraMuscular once PRN for acute agitation related to psychosis or dystonic reaction  OLANZapine 5 milliGRAM(s) Oral every 6 hours PRN agitation  OLANZapine Injectable 5 milliGRAM(s) IntraMuscular Once PRN severe psychotic agitation  
MEDICATIONS  (PRN):  OLANZapine 5 milliGRAM(s) Oral every 6 hours PRN agitation  OLANZapine Injectable 5 milliGRAM(s) IntraMuscular Once PRN severe psychotic agitation  
MEDICATIONS  (PRN):  OLANZapine 5 milliGRAM(s) Oral every 6 hours PRN agitation  OLANZapine Injectable 5 milliGRAM(s) IntraMuscular Once PRN severe psychotic agitation  
MEDICATIONS  (PRN):  diphenhydrAMINE Injectable 50 milliGRAM(s) IntraMuscular once PRN for acute agitation related to psychosis or dystonic reaction  OLANZapine 5 milliGRAM(s) Oral every 6 hours PRN agitation  OLANZapine Injectable 5 milliGRAM(s) IntraMuscular Once PRN severe psychotic agitation  
MEDICATIONS  (PRN):  diphenhydrAMINE Injectable 50 milliGRAM(s) IntraMuscular once PRN for acute agitation related to psychosis or dystonic reaction  OLANZapine 5 milliGRAM(s) Oral every 6 hours PRN agitation  OLANZapine Injectable 5 milliGRAM(s) IntraMuscular Once PRN severe psychotic agitation  
MEDICATIONS  (PRN):  OLANZapine 5 milliGRAM(s) Oral every 6 hours PRN agitation  OLANZapine Injectable 5 milliGRAM(s) IntraMuscular Once PRN severe psychotic agitation  
MEDICATIONS  (PRN):  LORazepam     Tablet 2 milliGRAM(s) Oral four times a day PRN severe anxiety related to psychosis  OLANZapine 5 milliGRAM(s) Oral every 6 hours PRN agitation  OLANZapine Injectable 5 milliGRAM(s) IntraMuscular Once PRN severe psychotic agitation  
MEDICATIONS  (PRN):  OLANZapine 5 milliGRAM(s) Oral every 6 hours PRN agitation  OLANZapine Injectable 5 milliGRAM(s) IntraMuscular Once PRN severe psychotic agitation  
MEDICATIONS  (PRN):  LORazepam     Tablet 2 milliGRAM(s) Oral four times a day PRN severe anxiety related to psychosis  OLANZapine 5 milliGRAM(s) Oral every 6 hours PRN agitation  OLANZapine Injectable 5 milliGRAM(s) IntraMuscular Once PRN severe psychotic agitation  
MEDICATIONS  (PRN):  diphenhydrAMINE Injectable 50 milliGRAM(s) IntraMuscular once PRN for acute agitation related to psychosis or dystonic reaction  OLANZapine 5 milliGRAM(s) Oral every 6 hours PRN agitation  OLANZapine Injectable 5 milliGRAM(s) IntraMuscular Once PRN severe psychotic agitation  
MEDICATIONS  (PRN):  OLANZapine 5 milliGRAM(s) Oral every 6 hours PRN agitation  OLANZapine Injectable 5 milliGRAM(s) IntraMuscular Once PRN severe psychotic agitation  
MEDICATIONS  (PRN):  OLANZapine 5 milliGRAM(s) Oral every 6 hours PRN agitation  OLANZapine Injectable 5 milliGRAM(s) IntraMuscular Once PRN severe psychotic agitation  
MEDICATIONS  (PRN):  LORazepam     Tablet 2 milliGRAM(s) Oral four times a day PRN severe anxiety related to psychosis  OLANZapine 5 milliGRAM(s) Oral every 6 hours PRN agitation  OLANZapine Injectable 5 milliGRAM(s) IntraMuscular Once PRN severe psychotic agitation  
MEDICATIONS  (PRN):  diphenhydrAMINE Injectable 50 milliGRAM(s) IntraMuscular once PRN for acute agitation related to psychosis or dystonic reaction  OLANZapine 5 milliGRAM(s) Oral every 6 hours PRN agitation  OLANZapine Injectable 5 milliGRAM(s) IntraMuscular Once PRN severe psychotic agitation  
MEDICATIONS  (PRN):  diphenhydrAMINE Injectable 50 milliGRAM(s) IntraMuscular once PRN for acute agitation related to psychosis or dystonic reaction  OLANZapine 5 milliGRAM(s) Oral every 6 hours PRN agitation  OLANZapine Injectable 5 milliGRAM(s) IntraMuscular Once PRN severe psychotic agitation  
MEDICATIONS  (PRN):  LORazepam     Tablet 2 milliGRAM(s) Oral four times a day PRN severe anxiety related to psychosis  OLANZapine 5 milliGRAM(s) Oral every 6 hours PRN agitation  OLANZapine Injectable 5 milliGRAM(s) IntraMuscular Once PRN severe psychotic agitation  
MEDICATIONS  (PRN):  diphenhydrAMINE Injectable 50 milliGRAM(s) IntraMuscular once PRN for acute agitation related to psychosis or dystonic reaction  OLANZapine 5 milliGRAM(s) Oral every 6 hours PRN agitation  OLANZapine Injectable 5 milliGRAM(s) IntraMuscular Once PRN severe psychotic agitation  
MEDICATIONS  (PRN):  OLANZapine 5 milliGRAM(s) Oral every 6 hours PRN agitation  OLANZapine Injectable 5 milliGRAM(s) IntraMuscular Once PRN severe psychotic agitation  
MEDICATIONS  (PRN):  diphenhydrAMINE Injectable 50 milliGRAM(s) IntraMuscular once PRN for acute agitation related to psychosis or dystonic reaction  OLANZapine 5 milliGRAM(s) Oral every 6 hours PRN agitation  OLANZapine Injectable 5 milliGRAM(s) IntraMuscular Once PRN severe psychotic agitation  
MEDICATIONS  (PRN):  LORazepam     Tablet 2 milliGRAM(s) Oral four times a day PRN severe anxiety related to psychosis  OLANZapine 5 milliGRAM(s) Oral every 6 hours PRN agitation  OLANZapine Injectable 5 milliGRAM(s) IntraMuscular Once PRN severe psychotic agitation  
MEDICATIONS  (PRN):  diphenhydrAMINE Injectable 50 milliGRAM(s) IntraMuscular once PRN for acute agitation related to psychosis or dystonic reaction  OLANZapine 5 milliGRAM(s) Oral every 6 hours PRN agitation  OLANZapine Injectable 5 milliGRAM(s) IntraMuscular Once PRN severe psychotic agitation  
MEDICATIONS  (PRN):  LORazepam     Tablet 2 milliGRAM(s) Oral four times a day PRN severe anxiety related to psychosis  OLANZapine 5 milliGRAM(s) Oral every 6 hours PRN agitation  OLANZapine Injectable 5 milliGRAM(s) IntraMuscular Once PRN severe psychotic agitation  
MEDICATIONS  (PRN):  diphenhydrAMINE Injectable 50 milliGRAM(s) IntraMuscular once PRN for acute agitation related to psychosis or dystonic reaction  OLANZapine 5 milliGRAM(s) Oral every 6 hours PRN agitation  OLANZapine Injectable 5 milliGRAM(s) IntraMuscular Once PRN severe psychotic agitation

## 2022-12-20 NOTE — BH INPATIENT PSYCHIATRY PROGRESS NOTE - OTHER
partial.  patient has some improved insight but his improvement and adherence on the unit are at least in part due to his desire to be discharged from the hospital and not understanding of his illness. less preoccupied with discharge Adventism preoccupations/delusions on more extensive probing neutral to euthymic guarded partial "good"

## 2022-12-20 NOTE — BH INPATIENT PSYCHIATRY PROGRESS NOTE - NSTXDCOPNOGOAL_PSY_ALL_CORE
Will agree to participate in appropriate outpatient care
Other...
Other...
Will agree to participate in appropriate outpatient care
Other...
Will agree to participate in appropriate outpatient care
Other...
Other...
Will agree to participate in appropriate outpatient care
Other...
Will agree to participate in appropriate outpatient care
Other...
Will agree to participate in appropriate outpatient care

## 2022-12-20 NOTE — BH INPATIENT PSYCHIATRY PROGRESS NOTE - NSBHCONTPROVIDER_PSY_ALL_CORE
Not applicable

## 2022-12-20 NOTE — BH DISCHARGE NOTE NURSING/SOCIAL WORK/PSYCH REHAB - NSDCPRGOAL_PSY_ALL_CORE
Pt made  progress as he continues to exhibit more receptiveness to the medications and demonstrating daily compliance with medication regiment with good effect. Pt demonstrating HEALY compliance with good effect as well. Pt seems to be more trusting of the treatment team and is encouraged to continue to identify the ways in which practicing daily compliance with medications has been beneficial to him. Pt denies SI/HI/I/P and AH/VH/TH. Pt maintains daily ADLs well on an independent basis. Patient engaged in the therapeutic milieu and attended about 50% of groups on the unit.    Patient denied all symptoms upon discharge. Patient identified “staying away from drugs” as a way for him to prevent relapse/rehospitalization. Patient was unable to identify effective coping skills to manage symptoms.   Patient is encouraged to continue to comply with his treatment and medication after discharge from the unit.   Upon discharge,  patient was provided with a [employer] survey, and completed a safety plan.

## 2022-12-20 NOTE — BH INPATIENT PSYCHIATRY PROGRESS NOTE - NSDCCRITERIA_PSY_ALL_CORE
cgi-i<=2

## 2022-12-20 NOTE — BH INPATIENT PSYCHIATRY PROGRESS NOTE - NSTXMEDICPROGRES_PSY_ALL_CORE
[facility] Hospitalist Progress Note     CC: Hospital Follow up    PCP: Shiv Reyes MD       Assessment/Plan:     Principal Problem:    Acute chest pain  Active Problems:    Syncope, near    Weakness generalized    Tommy Renton a 82year old male with
Improving
cough  -does not appear to be active exacerbation  - CXR ok  -cont home meds  - pulm FU     Anemia  -baseline around ~9  -see above  -holding diuretics     Hypothyroidism  -cont home med     GERD, history of barretts  -cont PPI     Gout  -cont allopurinol
3.23*   --   3.10*   HGB  12.3*  11.1*  10.7*  10.6*   HCT  37.8*  33.3*   --   32.1*   MCV  105.1*  103.2*   --   103.3*   MCH  34.1*  34.3*   --   34.3*   MCHC  32.5  33.2   --   33.2   RDW  18.7*  18.6*   --   18.1*   WBC  10.9  10.3   --   10.0   PLT
No Change
Improving
No Change
Improving
No Change
Improving
Improving
Met - goal discontinued
Improving
No Change
No Change
Improving
No Change
Improving
No Change
Improving
Improving
No Change
Improving
No Change
Improving

## 2022-12-20 NOTE — BH INPATIENT PSYCHIATRY PROGRESS NOTE - NSTXDCOPNODATEEST_PSY_ALL_CORE
02-Nov-2022
23-Nov-2022
02-Nov-2022
15-Dec-2022
02-Nov-2022
23-Nov-2022
02-Nov-2022
15-Dec-2022
02-Nov-2022
23-Nov-2022
23-Nov-2022
02-Nov-2022
15-Dec-2022
02-Nov-2022

## 2022-12-20 NOTE — BH INPATIENT PSYCHIATRY PROGRESS NOTE - NSBHATTESTBILLINGAW_PSY_A_CORE
[id number]-Oxdixmaicw Inpatient care - low complexity - 15 minutes
10335-Nbnafltosa Inpatient care - low complexity - 15 minutes
23690-Lnjuoglwcn Inpatient care - low complexity - 15 minutes
14948-Sgarvafcef Inpatient care - low complexity - 15 minutes
92065-Rbjmerxdvs Inpatient care - low complexity - 15 minutes
27612-Mljoxumstr Inpatient care - low complexity - 15 minutes
52439-Gskgyjvrhr Inpatient care - low complexity - 15 minutes
16858-Cuifothffi Inpatient care - moderate complexity - 25 minutes
60904-Suymuzolvn Inpatient care - moderate complexity - 25 minutes
18217-Gnobsvmhbr Inpatient care - low complexity - 15 minutes
25194-Pdmvzytapf Inpatient care - low complexity - 15 minutes
04178-Jbnxsgisfk Inpatient care - low complexity - 15 minutes
52923-Zpsyhbneib Inpatient care - moderate complexity - 25 minutes
37352-Vwzuwgfsic Inpatient care - moderate complexity - 25 minutes
61297-Nrjyxuepqd Inpatient care - low complexity - 15 minutes
86581-Cauugcuplv Inpatient care - low complexity - 15 minutes
10986-Ecmrvzopnv Inpatient care - low complexity - 15 minutes
45894-Vttuerdxnb Inpatient care - low complexity - 15 minutes
81092-Vifurwkvuw Inpatient care - moderate complexity - 25 minutes
19479-Fqcysxuzqv Inpatient care - low complexity - 15 minutes
92182-Ebrwcrvtnp Inpatient care - moderate complexity - 25 minutes
41468-Hxxladudbo Inpatient care - high complexity - 35 minutes
61978-Ziwxtfvpkf Inpatient care - low complexity - 15 minutes
85089-Jqoeqcgfuu Inpatient care - low complexity - 15 minutes
19722-Eazfivedxx Inpatient care - low complexity - 15 minutes
95193-Xakcwhgeqe Inpatient care - moderate complexity - 25 minutes
51844-Zwcremdyvg Inpatient care - low complexity - 15 minutes
25475-Rttanumtej Inpatient care - low complexity - 15 minutes
56262-Ohsktfsanf Inpatient care - low complexity - 15 minutes
34722-Tjcdxavsqr Inpatient care - low complexity - 15 minutes
65595-Euhmtiddxj Inpatient care - low complexity - 15 minutes
49337-Phvubgfdsy Inpatient care - low complexity - 15 minutes
05184-Ybpxdqyrvm Inpatient care - low complexity - 15 minutes
05582-Rthghromdc Inpatient care - low complexity - 15 minutes
30105-Duzldvnbrs Inpatient care - moderate complexity - 25 minutes
81376-Rtiatkrqbc Inpatient care - low complexity - 15 minutes
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
## 2022-12-20 NOTE — BH INPATIENT PSYCHIATRY PROGRESS NOTE - NSBHMSEAFFRANGE_PSY_A_CORE
Labile
Full/Other
Full/Other
Other
Full
Full/Other
Other
Other
Labile
Full
Other
Other
Labile
Other
Full/Other
Full/Other
Other
Full/Other
Other
Labile
Labile
Other
Full
Other
Labile
Labile
Full/Other
Other
Other
Full
Labile
Full/Other
Full/Other
Other

## 2022-12-20 NOTE — BH INPATIENT PSYCHIATRY PROGRESS NOTE - NSBHMSETHTPROC_PSY_A_CORE
Linear/Other
Other
Other
Linear
Illogical
Other
Overinclusive/Tangential/Other
Linear/Other
Linear/Other
Overinclusive/Tangential/Other
Linear
Other
Linear/Other
Linear/Other
Linear
Linear/Other
Illogical
Illogical/Other
Linear/Other
Linear/Other
Other
Linear/Other
Other
Linear
Linear
Linear/Other
Illogical
Illogical/Other
Other
Other
Linear
Linear/Other
Illogical
Linear/Other
Overinclusive/Tangential/Other
Linear/Other

## 2022-12-20 NOTE — BH INPATIENT PSYCHIATRY PROGRESS NOTE - NSICDXBHTERTIARYDX_PSY_ALL_CORE
R/O Schizotypal personality   F23  

## 2022-12-20 NOTE — BH INPATIENT PSYCHIATRY PROGRESS NOTE - NSBHATTESTTYPEVISIT_PSY_A_CORE
Attending Only
Attending Only
PATSY without on-site Attending supervision
Attending Only
PATSY without on-site Attending supervision
Attending Only
 Delivery
Attending Only
PATSY without on-site Attending supervision
PATSY without on-site Attending supervision
Attending Only
Attending with Resident/Fellow/Student

## 2022-12-20 NOTE — BH INPATIENT PSYCHIATRY PROGRESS NOTE - NSTXANXDATETRGT_PSY_ALL_CORE
22-Dec-2022
11-Nov-2022
20-Nov-2022
16-Dec-2022
16-Dec-2022
20-Nov-2022
22-Dec-2022
29-Nov-2022
29-Nov-2022
16-Dec-2022
20-Nov-2022
20-Nov-2022
29-Nov-2022
29-Nov-2022
09-Dec-2022
11-Nov-2022
11-Nov-2022
29-Nov-2022
16-Dec-2022
29-Nov-2022
11-Nov-2022
09-Dec-2022
09-Dec-2022
16-Dec-2022
20-Nov-2022
11-Nov-2022
22-Dec-2022
20-Nov-2022
29-Nov-2022
11-Nov-2022
20-Nov-2022
09-Dec-2022

## 2022-12-20 NOTE — BH INPATIENT PSYCHIATRY PROGRESS NOTE - NSBHMSEINSIGHT_PSY_A_CORE
Poor
Other
Poor

## 2022-12-20 NOTE — BH INPATIENT PSYCHIATRY PROGRESS NOTE - NSBHMSERELATED_PSY_A_CORE
Fair
Other
Fair
Other
Fair
Other
Fair
Other
Fair
Fair
Other
Fair
Fair
Other
Other
Fair
Other
Fair
Other
Fair
Other
Other
Fair

## 2022-12-20 NOTE — BH INPATIENT PSYCHIATRY PROGRESS NOTE - NSCGIIMPROVESX_PSY_ALL_CORE
3 = Minimally improved - slightly better with little or no clinically meaningful reduction of symptoms.  Represents very little change in basic clinical status, level of care, or functional capacity.

## 2022-12-20 NOTE — BH INPATIENT PSYCHIATRY PROGRESS NOTE - NSBHMSEHYG_PSY_A_CORE
Fair
Poor
Fair
Poor
Fair
Fair
Poor
Fair

## 2022-12-20 NOTE — BH INPATIENT PSYCHIATRY PROGRESS NOTE - GENERAL APPEARANCE
No deformities present

## 2022-12-20 NOTE — BH INPATIENT PSYCHIATRY PROGRESS NOTE - NSBHPSYCHOLCOGORIENT_PSY_A_CORE
Oriented to time, place, person, situation
Oriented to time, place, person, situation
Not fully oriented...
Oriented to time, place, person, situation
Not fully oriented...
Oriented to time, place, person, situation
Not fully oriented...
Oriented to time, place, person, situation

## 2022-12-20 NOTE — BH INPATIENT PSYCHIATRY PROGRESS NOTE - NSBHMETABOLIC_PSY_ALL_CORE_FT
BMI: BMI (kg/m2): 51.8 (11-01-22 @ 06:43)  HbA1c: A1C with Estimated Average Glucose Result: 4.9 % (11-01-22 @ 08:00)    Glucose:   BP: 107/60 (11-15-22 @ 08:47) (107/60 - 107/60)  Lipid Panel: Date/Time: 11-02-22 @ 08:00  Cholesterol, Serum: 227  Direct LDL: --  HDL Cholesterol, Serum: 53  Total Cholesterol/HDL Ration Measurement: --  Triglycerides, Serum: 62  
BMI: BMI (kg/m2): 51.8 (11-01-22 @ 06:43)  HbA1c: A1C with Estimated Average Glucose Result: 4.9 % (11-01-22 @ 08:00)    Glucose:   BP: --  Lipid Panel: Date/Time: 11-02-22 @ 08:00  Cholesterol, Serum: 227  Direct LDL: --  HDL Cholesterol, Serum: 53  Total Cholesterol/HDL Ration Measurement: --  Triglycerides, Serum: 62  
BMI: BMI (kg/m2): 51.8 (11-01-22 @ 06:43)  HbA1c: A1C with Estimated Average Glucose Result: 4.9 % (11-01-22 @ 08:00)    Glucose:   BP: 111/62 (12-08-22 @ 08:27) (111/62 - 111/65)  Lipid Panel: Date/Time: 11-02-22 @ 08:00  Cholesterol, Serum: 227  Direct LDL: --  HDL Cholesterol, Serum: 53  Total Cholesterol/HDL Ration Measurement: --  Triglycerides, Serum: 62  
BMI: BMI (kg/m2): 51.8 (11-01-22 @ 06:43)  HbA1c: A1C with Estimated Average Glucose Result: 4.9 % (11-01-22 @ 08:00)    Glucose:   BP: 107/68 (11-23-22 @ 08:05) (104/62 - 107/68)  Lipid Panel: Date/Time: 11-02-22 @ 08:00  Cholesterol, Serum: 227  Direct LDL: --  HDL Cholesterol, Serum: 53  Total Cholesterol/HDL Ration Measurement: --  Triglycerides, Serum: 62  
BMI: BMI (kg/m2): 51.8 (11-01-22 @ 06:43)  HbA1c: A1C with Estimated Average Glucose Result: 4.9 % (11-01-22 @ 08:00)    Glucose:   BP: 98/60 (11-30-22 @ 08:14) (98/60 - 98/60)  Lipid Panel: Date/Time: 11-02-22 @ 08:00  Cholesterol, Serum: 227  Direct LDL: --  HDL Cholesterol, Serum: 53  Total Cholesterol/HDL Ration Measurement: --  Triglycerides, Serum: 62  
BMI: BMI (kg/m2): 51.8 (11-01-22 @ 06:43)  HbA1c: A1C with Estimated Average Glucose Result: 4.9 % (11-01-22 @ 08:00)    Glucose:   BP: 106/67 (12-16-22 @ 08:07) (106/67 - 106/67)  Lipid Panel: Date/Time: 11-02-22 @ 08:00  Cholesterol, Serum: 227  Direct LDL: --  HDL Cholesterol, Serum: 53  Total Cholesterol/HDL Ration Measurement: --  Triglycerides, Serum: 62  
BMI: BMI (kg/m2): 51.8 (11-01-22 @ 06:43)  HbA1c: A1C with Estimated Average Glucose Result: 4.9 % (11-01-22 @ 08:00)    Glucose:   BP: --  Lipid Panel: Date/Time: 11-02-22 @ 08:00  Cholesterol, Serum: 227  Direct LDL: --  HDL Cholesterol, Serum: 53  Total Cholesterol/HDL Ration Measurement: --  Triglycerides, Serum: 62  
BMI: BMI (kg/m2): 51.8 (11-01-22 @ 06:43)  HbA1c: A1C with Estimated Average Glucose Result: 4.9 % (11-01-22 @ 08:00)    Glucose:   BP: 111/65 (12-06-22 @ 07:56) (111/65 - 111/65)  Lipid Panel: Date/Time: 11-02-22 @ 08:00  Cholesterol, Serum: 227  Direct LDL: --  HDL Cholesterol, Serum: 53  Total Cholesterol/HDL Ration Measurement: --  Triglycerides, Serum: 62  
BMI: BMI (kg/m2): 51.8 (11-01-22 @ 06:43)  HbA1c: A1C with Estimated Average Glucose Result: 4.9 % (11-01-22 @ 08:00)    Glucose:   BP: 104/60 (11-27-22 @ 07:37) (104/60 - 104/60)  Lipid Panel: Date/Time: 11-02-22 @ 08:00  Cholesterol, Serum: 227  Direct LDL: --  HDL Cholesterol, Serum: 53  Total Cholesterol/HDL Ration Measurement: --  Triglycerides, Serum: 62  
BMI: BMI (kg/m2): 51.8 (11-01-22 @ 06:43)  HbA1c: A1C with Estimated Average Glucose Result: 4.9 % (11-01-22 @ 08:00)    Glucose:   BP: 111/62 (12-08-22 @ 08:27) (111/62 - 111/62)  Lipid Panel: Date/Time: 11-02-22 @ 08:00  Cholesterol, Serum: 227  Direct LDL: --  HDL Cholesterol, Serum: 53  Total Cholesterol/HDL Ration Measurement: --  Triglycerides, Serum: 62  
BMI: BMI (kg/m2): 51.8 (11-01-22 @ 06:43)  HbA1c: A1C with Estimated Average Glucose Result: 4.9 % (11-01-22 @ 08:00)    Glucose:   BP: 98/60 (11-30-22 @ 08:14) (98/60 - 98/60)  Lipid Panel: Date/Time: 11-02-22 @ 08:00  Cholesterol, Serum: 227  Direct LDL: --  HDL Cholesterol, Serum: 53  Total Cholesterol/HDL Ration Measurement: --  Triglycerides, Serum: 62  
BMI: BMI (kg/m2): 51.8 (11-01-22 @ 06:43)  HbA1c: A1C with Estimated Average Glucose Result: 4.9 % (11-01-22 @ 08:00)    Glucose:   BP: 104/60 (11-27-22 @ 07:37) (104/60 - 104/60)  Lipid Panel: Date/Time: 11-02-22 @ 08:00  Cholesterol, Serum: 227  Direct LDL: --  HDL Cholesterol, Serum: 53  Total Cholesterol/HDL Ration Measurement: --  Triglycerides, Serum: 62  
BMI: BMI (kg/m2): 51.8 (11-01-22 @ 06:43)  HbA1c: A1C with Estimated Average Glucose Result: 4.9 % (11-01-22 @ 08:00)    Glucose:   BP: 107/60 (11-15-22 @ 08:47) (107/60 - 121/60)  Lipid Panel: Date/Time: 11-02-22 @ 08:00  Cholesterol, Serum: 227  Direct LDL: --  HDL Cholesterol, Serum: 53  Total Cholesterol/HDL Ration Measurement: --  Triglycerides, Serum: 62  
BMI: BMI (kg/m2): 51.8 (11-01-22 @ 06:43)  HbA1c: A1C with Estimated Average Glucose Result: 4.9 % (11-01-22 @ 08:00)    Glucose:   BP: --  Lipid Panel: Date/Time: 11-02-22 @ 08:00  Cholesterol, Serum: 227  Direct LDL: --  HDL Cholesterol, Serum: 53  Total Cholesterol/HDL Ration Measurement: --  Triglycerides, Serum: 62  
BMI: BMI (kg/m2): 51.8 (11-01-22 @ 06:43)  HbA1c: A1C with Estimated Average Glucose Result: 4.9 % (11-01-22 @ 08:00)    Glucose:   BP: --  Lipid Panel: Date/Time: 11-02-22 @ 08:00  Cholesterol, Serum: 227  Direct LDL: --  HDL Cholesterol, Serum: 53  Total Cholesterol/HDL Ration Measurement: --  Triglycerides, Serum: 62  
BMI: BMI (kg/m2): 51.8 (11-01-22 @ 06:43)  HbA1c: A1C with Estimated Average Glucose Result: 4.9 % (11-01-22 @ 08:00)    Glucose:   BP: 102/60 (11-08-22 @ 12:45) (102/60 - 102/60)  Lipid Panel: Date/Time: 11-02-22 @ 08:00  Cholesterol, Serum: 227  Direct LDL: --  HDL Cholesterol, Serum: 53  Total Cholesterol/HDL Ration Measurement: --  Triglycerides, Serum: 62  
BMI: BMI (kg/m2): 51.8 (11-01-22 @ 06:43)  HbA1c: A1C with Estimated Average Glucose Result: 4.9 % (11-01-22 @ 08:00)    Glucose:   BP: --  Lipid Panel: Date/Time: 11-02-22 @ 08:00  Cholesterol, Serum: 227  Direct LDL: --  HDL Cholesterol, Serum: 53  Total Cholesterol/HDL Ration Measurement: --  Triglycerides, Serum: 62  
BMI: BMI (kg/m2): 51.8 (11-01-22 @ 06:43)  HbA1c: A1C with Estimated Average Glucose Result: 4.9 % (11-01-22 @ 08:00)    Glucose:   BP: 114/72 (12-17-22 @ 08:42) (114/72 - 114/72)  Lipid Panel: Date/Time: 11-02-22 @ 08:00  Cholesterol, Serum: 227  Direct LDL: --  HDL Cholesterol, Serum: 53  Total Cholesterol/HDL Ration Measurement: --  Triglycerides, Serum: 62  
BMI: BMI (kg/m2): 51.8 (11-01-22 @ 06:43)  HbA1c: A1C with Estimated Average Glucose Result: 4.9 % (11-01-22 @ 08:00)    Glucose:   BP: 107/75 (11-14-22 @ 09:16) (107/75 - 121/60)  Lipid Panel: Date/Time: 11-02-22 @ 08:00  Cholesterol, Serum: 227  Direct LDL: --  HDL Cholesterol, Serum: 53  Total Cholesterol/HDL Ration Measurement: --  Triglycerides, Serum: 62  
BMI: BMI (kg/m2): 51.8 (11-01-22 @ 06:43)  HbA1c: A1C with Estimated Average Glucose Result: 4.9 % (11-01-22 @ 08:00)    Glucose:   BP: --  Lipid Panel: Date/Time: 11-02-22 @ 08:00  Cholesterol, Serum: 227  Direct LDL: --  HDL Cholesterol, Serum: 53  Total Cholesterol/HDL Ration Measurement: --  Triglycerides, Serum: 62  
BMI: BMI (kg/m2): 51.8 (11-01-22 @ 06:43)  HbA1c: A1C with Estimated Average Glucose Result: 4.9 % (11-01-22 @ 08:00)    Glucose:   BP: 104/62 (11-21-22 @ 08:19) (104/62 - 104/62)  Lipid Panel: Date/Time: 11-02-22 @ 08:00  Cholesterol, Serum: 227  Direct LDL: --  HDL Cholesterol, Serum: 53  Total Cholesterol/HDL Ration Measurement: --  Triglycerides, Serum: 62  
BMI: BMI (kg/m2): 51.8 (11-01-22 @ 06:43)  HbA1c: A1C with Estimated Average Glucose Result: 4.9 % (11-01-22 @ 08:00)    Glucose:   BP: --  Lipid Panel: Date/Time: 11-02-22 @ 08:00  Cholesterol, Serum: 227  Direct LDL: --  HDL Cholesterol, Serum: 53  Total Cholesterol/HDL Ration Measurement: --  Triglycerides, Serum: 62  
BMI: BMI (kg/m2): 51.8 (11-01-22 @ 06:43)  HbA1c: A1C with Estimated Average Glucose Result: 4.9 % (11-01-22 @ 08:00)    Glucose:   BP: 125/81 (11-01-22 @ 06:04) (114/69 - 125/81)  Lipid Panel: Date/Time: 11-02-22 @ 08:00  Cholesterol, Serum: 227  Direct LDL: --  HDL Cholesterol, Serum: 53  Total Cholesterol/HDL Ration Measurement: --  Triglycerides, Serum: 62  
BMI: BMI (kg/m2): 51.8 (11-01-22 @ 06:43)  HbA1c: A1C with Estimated Average Glucose Result: 4.9 % (11-01-22 @ 08:00)    Glucose:   BP: 125/81 (11-01-22 @ 06:04) (114/69 - 125/81)  Lipid Panel: Date/Time: 11-02-22 @ 08:00  Cholesterol, Serum: 227  Direct LDL: --  HDL Cholesterol, Serum: 53  Total Cholesterol/HDL Ration Measurement: --  Triglycerides, Serum: 62  
BMI: BMI (kg/m2): 51.8 (11-01-22 @ 06:43)  HbA1c: A1C with Estimated Average Glucose Result: 4.9 % (11-01-22 @ 08:00)    Glucose:   BP: 109/63 (11-05-22 @ 08:14) (109/63 - 109/63)  Lipid Panel: Date/Time: 11-02-22 @ 08:00  Cholesterol, Serum: 227  Direct LDL: --  HDL Cholesterol, Serum: 53  Total Cholesterol/HDL Ration Measurement: --  Triglycerides, Serum: 62  
BMI: BMI (kg/m2): 51.8 (11-01-22 @ 06:43)  HbA1c: A1C with Estimated Average Glucose Result: 4.9 % (11-01-22 @ 08:00)    Glucose:   BP: 109/63 (11-05-22 @ 08:14) (109/63 - 109/63)  Lipid Panel: Date/Time: 11-02-22 @ 08:00  Cholesterol, Serum: 227  Direct LDL: --  HDL Cholesterol, Serum: 53  Total Cholesterol/HDL Ration Measurement: --  Triglycerides, Serum: 62  
BMI: BMI (kg/m2): 51.8 (11-01-22 @ 06:43)  HbA1c: A1C with Estimated Average Glucose Result: 4.9 % (11-01-22 @ 08:00)    Glucose:   BP: 107/60 (11-15-22 @ 08:47) (107/60 - 107/75)  Lipid Panel: Date/Time: 11-02-22 @ 08:00  Cholesterol, Serum: 227  Direct LDL: --  HDL Cholesterol, Serum: 53  Total Cholesterol/HDL Ration Measurement: --  Triglycerides, Serum: 62  
BMI: BMI (kg/m2): 51.8 (11-01-22 @ 06:43)  HbA1c: A1C with Estimated Average Glucose Result: 4.9 % (11-01-22 @ 08:00)    Glucose:   BP: 98/60 (11-30-22 @ 08:14) (98/60 - 98/60)  Lipid Panel: Date/Time: 11-02-22 @ 08:00  Cholesterol, Serum: 227  Direct LDL: --  HDL Cholesterol, Serum: 53  Total Cholesterol/HDL Ration Measurement: --  Triglycerides, Serum: 62  
BMI: BMI (kg/m2): 51.8 (11-01-22 @ 06:43)  HbA1c: A1C with Estimated Average Glucose Result: 4.9 % (11-01-22 @ 08:00)    Glucose:   BP: --  Lipid Panel: Date/Time: 11-02-22 @ 08:00  Cholesterol, Serum: 227  Direct LDL: --  HDL Cholesterol, Serum: 53  Total Cholesterol/HDL Ration Measurement: --  Triglycerides, Serum: 62  
BMI: BMI (kg/m2): 51.8 (11-01-22 @ 06:43)  HbA1c: A1C with Estimated Average Glucose Result: 4.9 % (11-01-22 @ 08:00)    Glucose:   BP: 102/60 (11-08-22 @ 12:45) (102/60 - 102/60)  Lipid Panel: Date/Time: 11-02-22 @ 08:00  Cholesterol, Serum: 227  Direct LDL: --  HDL Cholesterol, Serum: 53  Total Cholesterol/HDL Ration Measurement: --  Triglycerides, Serum: 62  
BMI: BMI (kg/m2): 51.8 (11-01-22 @ 06:43)  HbA1c: A1C with Estimated Average Glucose Result: 4.9 % (11-01-22 @ 08:00)    Glucose:   BP: 107/63 (11-22-22 @ 08:25) (104/62 - 107/63)  Lipid Panel: Date/Time: 11-02-22 @ 08:00  Cholesterol, Serum: 227  Direct LDL: --  HDL Cholesterol, Serum: 53  Total Cholesterol/HDL Ration Measurement: --  Triglycerides, Serum: 62  
BMI: BMI (kg/m2): 51.8 (11-01-22 @ 06:43)  HbA1c: A1C with Estimated Average Glucose Result: 4.9 % (11-01-22 @ 08:00)    Glucose:   BP: 102/60 (11-08-22 @ 12:45) (102/60 - 102/60)  Lipid Panel: Date/Time: 11-02-22 @ 08:00  Cholesterol, Serum: 227  Direct LDL: --  HDL Cholesterol, Serum: 53  Total Cholesterol/HDL Ration Measurement: --  Triglycerides, Serum: 62  
BMI: BMI (kg/m2): 51.8 (11-01-22 @ 06:43)  HbA1c: A1C with Estimated Average Glucose Result: 4.9 % (11-01-22 @ 08:00)    Glucose:   BP: --  Lipid Panel: Date/Time: 11-02-22 @ 08:00  Cholesterol, Serum: 227  Direct LDL: --  HDL Cholesterol, Serum: 53  Total Cholesterol/HDL Ration Measurement: --  Triglycerides, Serum: 62  
BMI: BMI (kg/m2): 51.8 (11-01-22 @ 06:43)  HbA1c: A1C with Estimated Average Glucose Result: 4.9 % (11-01-22 @ 08:00)    Glucose:   BP: 109/63 (11-05-22 @ 08:14) (109/63 - 109/63)  Lipid Panel: Date/Time: 11-02-22 @ 08:00  Cholesterol, Serum: 227  Direct LDL: --  HDL Cholesterol, Serum: 53  Total Cholesterol/HDL Ration Measurement: --  Triglycerides, Serum: 62  
BMI: BMI (kg/m2): 51.8 (11-01-22 @ 06:43)  HbA1c: A1C with Estimated Average Glucose Result: 4.9 % (11-01-22 @ 08:00)    Glucose:   BP: 107/68 (11-23-22 @ 08:05) (107/68 - 107/68)  Lipid Panel: Date/Time: 11-02-22 @ 08:00  Cholesterol, Serum: 227  Direct LDL: --  HDL Cholesterol, Serum: 53  Total Cholesterol/HDL Ration Measurement: --  Triglycerides, Serum: 62  
BMI: BMI (kg/m2): 51.8 (11-01-22 @ 06:43)  HbA1c: A1C with Estimated Average Glucose Result: 4.9 % (11-01-22 @ 08:00)    Glucose:   BP: 111/65 (12-06-22 @ 07:56) (111/65 - 111/65)  Lipid Panel: Date/Time: 11-02-22 @ 08:00  Cholesterol, Serum: 227  Direct LDL: --  HDL Cholesterol, Serum: 53  Total Cholesterol/HDL Ration Measurement: --  Triglycerides, Serum: 62

## 2022-12-20 NOTE — BH DISCHARGE NOTE NURSING/SOCIAL WORK/PSYCH REHAB - PATIENT PORTAL LINK FT
You can access the FollowMyHealth Patient Portal offered by [facility] by registering at the following website: [email or web address]. By joining [facility]’s FollowMyHealth portal, you will also be able to view your health information using other applications (apps) compatible with our system.

## 2022-12-20 NOTE — BH INPATIENT PSYCHIATRY PROGRESS NOTE - NSICDXBHPRIMARYDX_PSY_ALL_CORE
Schizophrenia spectrum and other psychotic disorders   F31  

## 2022-12-20 NOTE — BH INPATIENT PSYCHIATRY PROGRESS NOTE - NSBHFUPINTERVALHXFT_PSY_A_CORE
Staff report no interval events, compliant with meds, no prns, slept. No complaints.  He is being discharged home today with an AOT order.

## 2022-12-20 NOTE — BH INPATIENT PSYCHIATRY PROGRESS NOTE - NSBHMSEMOOD_PSY_A_CORE
Other
Normal
Other
Normal
Other

## 2022-12-20 NOTE — BH INPATIENT PSYCHIATRY PROGRESS NOTE - NSTXPROBANX_PSY_ALL_CORE
ANXIETY/PANIC/FEAR

## 2022-12-20 NOTE — BH INPATIENT PSYCHIATRY PROGRESS NOTE - NSTXANXGOAL_PSY_ALL_CORE
Be able to perform ADLs and maintain safety despite anxiety/panic daily

## 2022-12-20 NOTE — BH INPATIENT PSYCHIATRY PROGRESS NOTE - NSTXDCOPNOPROGRES_PSY_ALL_CORE
Improving
No Change
No Change
Improving
No Change
Improving
No Change
No Change
Improving
No Change
No Change
Improving
No Change
Improving
No Change
Improving
No Change
No Change
Improving
No Change

## 2022-12-20 NOTE — BH INPATIENT PSYCHIATRY DISCHARGE NOTE - ATTENDING ATTESTATION STATEMENT
I have personally seen and examined the patient. I have collaborated with and supervised the Not applicable

## 2022-12-20 NOTE — BH INPATIENT PSYCHIATRY PROGRESS NOTE - NSTXMEDICDATEEST_PSY_ALL_CORE
21-Nov-2022
27-Nov-2022
21-Nov-2022
01-Nov-2022
27-Nov-2022
01-Nov-2022
27-Nov-2022
27-Nov-2022
01-Nov-2022
15-Dec-2022
01-Nov-2022
27-Nov-2022
21-Nov-2022
27-Nov-2022
01-Nov-2022
21-Nov-2022
01-Nov-2022
21-Nov-2022
21-Nov-2022
01-Nov-2022
15-Dec-2022
15-Dec-2022
01-Nov-2022
21-Nov-2022
01-Nov-2022
27-Nov-2022
01-Nov-2022
01-Nov-2022
27-Nov-2022
01-Nov-2022
27-Nov-2022
21-Nov-2022

## 2022-12-20 NOTE — BH INPATIENT PSYCHIATRY PROGRESS NOTE - NSBHMSEKNOWHOW_PSY_ALL_CORE
Vocabulary

## 2022-12-20 NOTE — BH INPATIENT PSYCHIATRY PROGRESS NOTE - NSBHFUPINTERVALCCFT_PSY_A_CORE
Patient seen, chart reviewed, case discussed with team. Patient seen for discharge day management of psychosis and aggression.

## 2022-12-20 NOTE — BH DISCHARGE NOTE NURSING/SOCIAL WORK/PSYCH REHAB - DISCHARGE INSTRUCTIONS AFTERCARE APPOINTMENTS
In order to check the location, date, or time of your aftercare appointment, please refer to your Discharge Instructions Document given to you upon leaving the hospital.  If you have lost the instructions please call [phone]

## 2022-12-20 NOTE — BH INPATIENT PSYCHIATRY PROGRESS NOTE - NSTXPROBDCOPNO_PSY_ALL_CORE
DISCHARGE ISSUE - NON-ADHERENT WITH OUTPATIENT SERVICES

## 2022-12-20 NOTE — BH INPATIENT PSYCHIATRY PROGRESS NOTE - NSTXMEDICGOAL_PSY_ALL_CORE
Be able to describe the benefit of medication/treatment
Take all medications as prescribed
Be able to describe the benefit of medication/treatment
Take all medications as prescribed
Be able to describe the benefit of medication/treatment
Take all medications as prescribed
Be able to describe the benefit of medication/treatment
Take all medications as prescribed
Be able to describe the benefit of medication/treatment
Take all medications as prescribed
Take all medications as prescribed
Be able to describe the benefit of medication/treatment
Take all medications as prescribed
Be able to describe the benefit of medication/treatment
Be able to describe the benefit of medication/treatment
Take all medications as prescribed
Be able to describe the benefit of medication/treatment
Be able to describe the benefit of medication/treatment
Take all medications as prescribed
Be able to describe the benefit of medication/treatment
Take all medications as prescribed
Be able to describe the benefit of medication/treatment
Be able to describe the benefit of medication/treatment
Take all medications as prescribed
Be able to describe the benefit of medication/treatment
Take all medications as prescribed
Take all medications as prescribed

## 2022-12-20 NOTE — BH INPATIENT PSYCHIATRY PROGRESS NOTE - NSBHCHARTREVIEWVS_PSY_A_CORE FT
Vital Signs Last 24 Hrs  T(C): 36.3 (11-11-22 @ 08:23), Max: 36.3 (11-11-22 @ 08:23)  T(F): 97.3 (11-11-22 @ 08:23), Max: 97.3 (11-11-22 @ 08:23)  HR: --  BP: --  BP(mean): --  RR: --  SpO2: --    Orthostatic VS  11-11-22 @ 08:23  Lying BP: --/-- HR: --  Sitting BP: 117/64 HR: 71  Standing BP: --/-- HR: --  Site: --  Mode: --  Orthostatic VS  11-10-22 @ 07:52  Lying BP: --/-- HR: --  Sitting BP: 106/64 HR: 82  Standing BP: --/-- HR: --  Site: --  Mode: --  
Vital Signs Last 24 Hrs  T(C): --  T(F): --  HR: --  BP: --  BP(mean): --  RR: --  SpO2: --    
Vital Signs Last 24 Hrs  T(C): 36.5 (11-21-22 @ 08:19), Max: 36.5 (11-21-22 @ 08:19)  T(F): 97.7 (11-21-22 @ 08:19), Max: 97.7 (11-21-22 @ 08:19)  HR: 78 (11-21-22 @ 08:19) (78 - 78)  BP: 104/62 (11-21-22 @ 08:19) (104/62 - 104/62)  BP(mean): --  RR: --  SpO2: --    Orthostatic VS  11-20-22 @ 08:05  Lying BP: --/-- HR: --  Sitting BP: 107/64 HR: 97  Standing BP: --/-- HR: --  Site: --  Mode: --  
Vital Signs Last 24 Hrs  T(C): 36.6 (11-30-22 @ 08:14), Max: 36.6 (11-30-22 @ 08:14)  T(F): 97.8 (11-30-22 @ 08:14), Max: 97.8 (11-30-22 @ 08:14)  HR: 74 (11-30-22 @ 08:14) (74 - 74)  BP: 98/60 (11-30-22 @ 08:14) (98/60 - 98/60)  BP(mean): --  RR: --  SpO2: --    Orthostatic VS  11-29-22 @ 08:05  Lying BP: --/-- HR: --  Sitting BP: 98/60 HR: 73  Standing BP: --/-- HR: --  Site: --  Mode: --  
Vital Signs Last 24 Hrs  T(C): 36.4 (12-20-22 @ 08:24), Max: 36.4 (12-20-22 @ 08:24)  T(F): 97.5 (12-20-22 @ 08:24), Max: 97.5 (12-20-22 @ 08:24)  HR: --  BP: --  BP(mean): --  RR: --  SpO2: --    Orthostatic VS  12-20-22 @ 08:24  Lying BP: --/-- HR: --  Sitting BP: 121/87 HR: 82  Standing BP: --/-- HR: --  Site: --  Mode: --  Orthostatic VS  12-19-22 @ 08:48  Lying BP: --/-- HR: --  Sitting BP: 104/72 HR: 91  Standing BP: --/-- HR: --  Site: --  Mode: --  
Vital Signs Last 24 Hrs  T(C): 36.4 (11-10-22 @ 07:52), Max: 36.4 (11-10-22 @ 07:52)  T(F): 97.5 (11-10-22 @ 07:52), Max: 97.5 (11-10-22 @ 07:52)  HR: --  BP: --  BP(mean): --  RR: --  SpO2: --    Orthostatic VS  11-10-22 @ 07:52  Lying BP: --/-- HR: --  Sitting BP: 106/64 HR: 82  Standing BP: --/-- HR: --  Site: --  Mode: --  Orthostatic VS  11-09-22 @ 07:36  Lying BP: --/-- HR: --  Sitting BP: 125/75 HR: 87  Standing BP: --/-- HR: --  Site: --  Mode: --  
Vital Signs Last 24 Hrs  T(C): 36.1 (12-12-22 @ 08:21), Max: 36.1 (12-12-22 @ 08:21)  T(F): 97 (12-12-22 @ 08:21), Max: 97 (12-12-22 @ 08:21)  HR: --  BP: --  BP(mean): --  RR: --  SpO2: --    Orthostatic VS  12-12-22 @ 08:21  Lying BP: --/-- HR: --  Sitting BP: 99/56 HR: 89  Standing BP: --/-- HR: --  Site: --  Mode: --  
Vital Signs Last 24 Hrs  T(C): 36.4 (11-29-22 @ 08:05), Max: 36.4 (11-29-22 @ 08:05)  T(F): 97.6 (11-29-22 @ 08:05), Max: 97.6 (11-29-22 @ 08:05)  HR: --  BP: --  BP(mean): --  RR: --  SpO2: --    Orthostatic VS  11-29-22 @ 08:05  Lying BP: --/-- HR: --  Sitting BP: 98/60 HR: 73  Standing BP: --/-- HR: --  Site: --  Mode: --  Orthostatic VS  11-28-22 @ 08:11  Lying BP: --/-- HR: --  Sitting BP: 116/65 HR: 71  Standing BP: --/-- HR: --  Site: --  Mode: --  
Vital Signs Last 24 Hrs  T(C): 36.6 (11-15-22 @ 08:47), Max: 36.6 (11-15-22 @ 08:47)  T(F): 97.8 (11-15-22 @ 08:47), Max: 97.8 (11-15-22 @ 08:47)  HR: 62 (11-15-22 @ 08:47) (62 - 62)  BP: 107/60 (11-15-22 @ 08:47) (107/60 - 107/60)  BP(mean): --  RR: --  SpO2: --    
Vital Signs Last 24 Hrs  T(C): 36.5 (11-14-22 @ 09:16), Max: 36.5 (11-14-22 @ 09:16)  T(F): 97.7 (11-14-22 @ 09:16), Max: 97.7 (11-14-22 @ 09:16)  HR: 81 (11-14-22 @ 09:16) (81 - 81)  BP: 107/75 (11-14-22 @ 09:16) (107/75 - 107/75)  BP(mean): --  RR: --  SpO2: --    
Vital Signs Last 24 Hrs  T(C): 36.8 (11-05-22 @ 08:14), Max: 36.8 (11-05-22 @ 08:14)  T(F): 98.2 (11-05-22 @ 08:14), Max: 98.2 (11-05-22 @ 08:14)  HR: 79 (11-05-22 @ 08:14) (79 - 79)  BP: 109/63 (11-05-22 @ 08:14) (109/63 - 109/63)  BP(mean): --  RR: --  SpO2: --    Orthostatic VS  11-04-22 @ 07:42  Lying BP: --/-- HR: --  Sitting BP: 134/64 HR: 93  Standing BP: --/-- HR: --  Site: --  Mode: --  Orthostatic VS  11-03-22 @ 19:25  Lying BP: --/-- HR: --  Sitting BP: 112/71 HR: 73  Standing BP: 116/73 HR: 91  Site: --  Mode: --  
Vital Signs Last 24 Hrs  T(C): 36.4 (11-06-22 @ 08:18), Max: 36.4 (11-06-22 @ 08:18)  T(F): 97.6 (11-06-22 @ 08:18), Max: 97.6 (11-06-22 @ 08:18)  HR: --  BP: --  BP(mean): --  RR: --  SpO2: --    Orthostatic VS  11-06-22 @ 08:18  Lying BP: --/-- HR: --  Sitting BP: 102/60 HR: 66  Standing BP: --/-- HR: --  Site: --  Mode: --  
Vital Signs Last 24 Hrs  T(C): 36.7 (11-02-22 @ 08:26), Max: 37 (11-01-22 @ 20:24)  T(F): 98.1 (11-02-22 @ 08:26), Max: 98.6 (11-01-22 @ 20:24)      Orthostatic VS  11-02-22 @ 08:26  Lying BP: --/-- HR: --  Sitting BP: 114/68 HR: 77  Standing BP: 120/62 HR: 99  Site: --  Mode: --  Orthostatic VS  11-01-22 @ 20:24  Lying BP: --/-- HR: --  Sitting BP: 122/83 HR: 90  Standing BP: 118/80 HR: 96  Site: upper left arm  Mode: electronic  Orthostatic VS  11-01-22 @ 06:43  Lying BP: --/-- HR: --  Sitting BP: 132/82 HR: 89  Standing BP: 134/80 HR: 82  Site: --  Mode: --  
Vital Signs Last 24 Hrs  T(C): 36.6 (12-19-22 @ 08:48), Max: 36.6 (12-19-22 @ 08:48)  T(F): 97.8 (12-19-22 @ 08:48), Max: 97.8 (12-19-22 @ 08:48)  HR: --  BP: --  BP(mean): --  RR: --  SpO2: --    Orthostatic VS  12-19-22 @ 08:48  Lying BP: --/-- HR: --  Sitting BP: 104/72 HR: 91  Standing BP: --/-- HR: --  Site: --  Mode: --  Orthostatic VS  12-18-22 @ 08:36  Lying BP: --/-- HR: --  Sitting BP: 114/67 HR: 81  Standing BP: --/-- HR: --  Site: --  Mode: --  
Vital Signs Last 24 Hrs  T(C): 36.2 (12-14-22 @ 06:30), Max: 36.2 (12-14-22 @ 06:30)  T(F): 97.2 (12-14-22 @ 06:30), Max: 97.2 (12-14-22 @ 06:30)  HR: --  BP: --  BP(mean): --  RR: --  SpO2: --    Orthostatic VS  12-14-22 @ 06:30  Lying BP: --/-- HR: --  Sitting BP: 116/74 HR: 71  Standing BP: --/-- HR: --  Site: upper left arm  Mode: electronic  Orthostatic VS  12-13-22 @ 08:25  Lying BP: --/-- HR: --  Sitting BP: 122/72 HR: 93  Standing BP: --/-- HR: --  Site: --  Mode: --  
Vital Signs Last 24 Hrs  T(C): 36.3 (12-06-22 @ 07:56), Max: 36.3 (12-06-22 @ 07:56)  T(F): 97.4 (12-06-22 @ 07:56), Max: 97.4 (12-06-22 @ 07:56)  HR: 87 (12-06-22 @ 07:56) (87 - 87)  BP: 111/65 (12-06-22 @ 07:56) (111/65 - 111/65)  BP(mean): --  RR: --  SpO2: --    Orthostatic VS  12-05-22 @ 08:27  Lying BP: --/-- HR: --  Sitting BP: 110/70 HR: 78  Standing BP: --/-- HR: --  Site: --  Mode: --  
Vital Signs Last 24 Hrs  T(C): 36.8 (11-23-22 @ 08:05), Max: 36.8 (11-23-22 @ 08:05)  T(F): 98.2 (11-23-22 @ 08:05), Max: 98.2 (11-23-22 @ 08:05)  HR: 80 (11-23-22 @ 08:05) (80 - 80)  BP: 107/68 (11-23-22 @ 08:05) (107/68 - 107/68)  BP(mean): --  RR: --  SpO2: --    
Vital Signs Last 24 Hrs  T(C): 36.9 (12-02-22 @ 08:49), Max: 36.9 (12-02-22 @ 08:49)  T(F): 98.4 (12-02-22 @ 08:49), Max: 98.4 (12-02-22 @ 08:49)  HR: --  BP: --  BP(mean): --  RR: --  SpO2: --    Orthostatic VS  12-02-22 @ 08:49  Lying BP: --/-- HR: --  Sitting BP: 102/63 HR: 86  Standing BP: --/-- HR: --  Site: --  Mode: --  Orthostatic VS  12-01-22 @ 08:11  Lying BP: --/-- HR: --  Sitting BP: 104/62 HR: 85  Standing BP: --/-- HR: --  Site: --  Mode: --  
Vital Signs Last 24 Hrs  T(C): 36.6 (12-01-22 @ 08:11), Max: 36.6 (12-01-22 @ 08:11)  T(F): 97.8 (12-01-22 @ 08:11), Max: 97.8 (12-01-22 @ 08:11)      Orthostatic VS  12-01-22 @ 08:11  Lying BP: --/-- HR: --  Sitting BP: 104/62 HR: 85  Standing BP: --/-- HR: --  Site: --  Mode: --  
Vital Signs Last 24 Hrs  T(C): 37.1 (11-17-22 @ 08:09), Max: 37.1 (11-17-22 @ 08:09)  T(F): 98.7 (11-17-22 @ 08:09), Max: 98.7 (11-17-22 @ 08:09)  HR: --  BP: --  BP(mean): --  RR: --  SpO2: --    Orthostatic VS  11-17-22 @ 08:09  Lying BP: --/-- HR: --  Sitting BP: 99/61 HR: 87  Standing BP: --/-- HR: --  Site: --  Mode: --  Orthostatic VS  11-16-22 @ 08:28  Lying BP: --/-- HR: --  Sitting BP: 112/70 HR: 92  Standing BP: --/-- HR: --  Site: --  Mode: --  
Vital Signs Last 24 Hrs  T(C): 36.6 (11-07-22 @ 06:45), Max: 36.6 (11-07-22 @ 06:45)  T(F): 97.8 (11-07-22 @ 06:45), Max: 97.8 (11-07-22 @ 06:45)  HR: --  BP: --  BP(mean): --  RR: --  SpO2: --    Orthostatic VS  11-07-22 @ 06:45  Lying BP: --/-- HR: --  Sitting BP: 107/76 HR: 65  Standing BP: --/-- HR: --  Site: --  Mode: --  Orthostatic VS  11-06-22 @ 08:18  Lying BP: --/-- HR: --  Sitting BP: 102/60 HR: 66  Standing BP: --/-- HR: --  Site: --  Mode: --  
Vital Signs Last 24 Hrs  T(C): 36.9 (11-25-22 @ 08:26), Max: 36.9 (11-25-22 @ 08:26)  T(F): 98.4 (11-25-22 @ 08:26), Max: 98.4 (11-25-22 @ 08:26)  HR: --  BP: --  BP(mean): --  RR: --  SpO2: --    Orthostatic VS  11-25-22 @ 08:26  Lying BP: --/-- HR: --  Sitting BP: 127/63 HR: 113  Standing BP: --/-- HR: --  Site: --  Mode: --  Orthostatic VS  11-24-22 @ 08:02  Lying BP: --/-- HR: --  Sitting BP: 120/66 HR: 79  Standing BP: --/-- HR: --  Site: --  Mode: --  
Vital Signs Last 24 Hrs  T(C): 37.6 (12-15-22 @ 08:12), Max: 37.6 (12-15-22 @ 08:12)  T(F): 99.6 (12-15-22 @ 08:12), Max: 99.6 (12-15-22 @ 08:12)  HR: --  BP: --  BP(mean): --  RR: --  SpO2: --    Orthostatic VS  12-15-22 @ 08:12  Lying BP: --/-- HR: --  Sitting BP: 116/66 HR: 109  Standing BP: --/-- HR: --  Site: --  Mode: --  Orthostatic VS  12-14-22 @ 06:30  Lying BP: --/-- HR: --  Sitting BP: 116/74 HR: 71  Standing BP: --/-- HR: --  Site: upper left arm  Mode: electronic  
Vital Signs Last 24 Hrs  T(C): 36.4 (12-07-22 @ 08:01), Max: 36.4 (12-07-22 @ 08:01)  T(F): 97.5 (12-07-22 @ 08:01), Max: 97.5 (12-07-22 @ 08:01)      Orthostatic VS  12-07-22 @ 08:01  Lying BP: --/-- HR: --  Sitting BP: 103/65 HR: 84  Standing BP: --/-- HR: --  Site: --  Mode: --  
Vital Signs Last 24 Hrs  T(C): 36.5 (11-16-22 @ 08:28), Max: 36.5 (11-16-22 @ 08:28)  T(F): 97.7 (11-16-22 @ 08:28), Max: 97.7 (11-16-22 @ 08:28)  HR: --  BP: --  BP(mean): --  RR: --  SpO2: --    Orthostatic VS  11-16-22 @ 08:28  Lying BP: --/-- HR: --  Sitting BP: 112/70 HR: 92  Standing BP: --/-- HR: --  Site: --  Mode: --  
Vital Signs Last 24 Hrs  T(C): 36.8 (11-03-22 @ 08:14), Max: 36.8 (11-03-22 @ 08:14)  T(F): 98.3 (11-03-22 @ 08:14), Max: 98.3 (11-03-22 @ 08:14)      Orthostatic VS  11-03-22 @ 08:14  Lying BP: --/-- HR: --  Sitting BP: 106/66 HR: 84  Standing BP: --/-- HR: --  Site: --  Mode: --  Orthostatic VS  11-02-22 @ 21:46  Lying BP: --/-- HR: --  Sitting BP: 124/81 HR: 80  Standing BP: 133/75 HR: 92  Site: upper left arm  Mode: electronic  Orthostatic VS  11-02-22 @ 08:26  Lying BP: --/-- HR: --  Sitting BP: 114/68 HR: 77  Standing BP: 120/62 HR: 99  Site: --  Mode: --  Orthostatic VS  11-01-22 @ 20:24  Lying BP: --/-- HR: --  Sitting BP: 122/83 HR: 90  Standing BP: 118/80 HR: 96  Site: upper left arm  Mode: electronic  
Vital Signs Last 24 Hrs  T(C): 36.4 (11-04-22 @ 07:42), Max: 36.8 (11-03-22 @ 19:25)  T(F): 97.5 (11-04-22 @ 07:42), Max: 98.2 (11-03-22 @ 19:25)  HR: --  BP: --  BP(mean): --  RR: --  SpO2: --    Orthostatic VS  11-04-22 @ 07:42  Lying BP: --/-- HR: --  Sitting BP: 134/64 HR: 93  Standing BP: --/-- HR: --  Site: --  Mode: --  Orthostatic VS  11-03-22 @ 19:25  Lying BP: --/-- HR: --  Sitting BP: 112/71 HR: 73  Standing BP: 116/73 HR: 91  Site: --  Mode: --  Orthostatic VS  11-03-22 @ 08:14  Lying BP: --/-- HR: --  Sitting BP: 106/66 HR: 84  Standing BP: --/-- HR: --  Site: --  Mode: --  Orthostatic VS  11-02-22 @ 21:46  Lying BP: --/-- HR: --  Sitting BP: 124/81 HR: 80  Standing BP: 133/75 HR: 92  Site: upper left arm  Mode: electronic  
Vital Signs Last 24 Hrs  T(C): 36.4 (12-16-22 @ 08:07), Max: 36.4 (12-16-22 @ 08:07)  T(F): 97.6 (12-16-22 @ 08:07), Max: 97.6 (12-16-22 @ 08:07)  HR: --  BP: 106/67 (12-16-22 @ 08:07) (106/67 - 106/67)  BP(mean): 81 (12-16-22 @ 08:07) (81 - 81)  RR: --  SpO2: --    Orthostatic VS  12-15-22 @ 08:12  Lying BP: --/-- HR: --  Sitting BP: 116/66 HR: 109  Standing BP: --/-- HR: --  Site: --  Mode: --  
Vital Signs Last 24 Hrs  T(C): 36.4 (11-18-22 @ 08:28), Max: 36.4 (11-18-22 @ 08:28)  T(F): 97.5 (11-18-22 @ 08:28), Max: 97.5 (11-18-22 @ 08:28)  HR: --  BP: --  BP(mean): --  RR: --  SpO2: --    Orthostatic VS  11-18-22 @ 08:28  Lying BP: --/-- HR: --  Sitting BP: 111/69 HR: 98  Standing BP: --/-- HR: --  Site: --  Mode: --  Orthostatic VS  11-17-22 @ 08:09  Lying BP: --/-- HR: --  Sitting BP: 99/61 HR: 87  Standing BP: --/-- HR: --  Site: --  Mode: --  
Vital Signs Last 24 Hrs  T(C): 36.5 (12-05-22 @ 08:27), Max: 36.5 (12-05-22 @ 08:27)  T(F): 97.7 (12-05-22 @ 08:27), Max: 97.7 (12-05-22 @ 08:27)  HR: --  BP: --  BP(mean): --  RR: --  SpO2: --    Orthostatic VS  12-05-22 @ 08:27  Lying BP: --/-- HR: --  Sitting BP: 110/70 HR: 78  Standing BP: --/-- HR: --  Site: --  Mode: --  Orthostatic VS  12-04-22 @ 08:56  Lying BP: --/-- HR: --  Sitting BP: 112/65 HR: 66  Standing BP: --/-- HR: --  Site: --  Mode: --  
Vital Signs Last 24 Hrs  T(C): 36.2 (12-13-22 @ 08:25), Max: 36.2 (12-13-22 @ 08:25)  T(F): 97.2 (12-13-22 @ 08:25), Max: 97.2 (12-13-22 @ 08:25)  HR: --  BP: --  BP(mean): --  RR: --  SpO2: --    Orthostatic VS  12-13-22 @ 08:25  Lying BP: --/-- HR: --  Sitting BP: 122/72 HR: 93  Standing BP: --/-- HR: --  Site: --  Mode: --  Orthostatic VS  12-12-22 @ 08:21  Lying BP: --/-- HR: --  Sitting BP: 99/56 HR: 89  Standing BP: --/-- HR: --  Site: --  Mode: --  
Vital Signs Last 24 Hrs  T(C): 36.8 (11-08-22 @ 12:45), Max: 36.8 (11-08-22 @ 12:45)  T(F): 98.2 (11-08-22 @ 12:45), Max: 98.2 (11-08-22 @ 12:45)  HR: 80 (11-08-22 @ 12:45) (80 - 80)  BP: 102/60 (11-08-22 @ 12:45) (102/60 - 102/60)  BP(mean): --  RR: --  SpO2: --    Orthostatic VS  11-07-22 @ 06:45  Lying BP: --/-- HR: --  Sitting BP: 107/76 HR: 65  Standing BP: --/-- HR: --  Site: --  Mode: --  
Vital Signs Last 24 Hrs  T(C): 36.7 (11-09-22 @ 07:36), Max: 36.8 (11-08-22 @ 12:45)  T(F): 98 (11-09-22 @ 07:36), Max: 98.2 (11-08-22 @ 12:45)  HR: 80 (11-08-22 @ 12:45) (80 - 80)  BP: 102/60 (11-08-22 @ 12:45) (102/60 - 102/60)      Orthostatic VS  11-09-22 @ 07:36  Lying BP: --/-- HR: --  Sitting BP: 125/75 HR: 87  Standing BP: --/-- HR: --  Site: --  Mode: --  
Vital Signs Last 24 Hrs  T(C): 36.4 (11-28-22 @ 08:11), Max: 36.4 (11-28-22 @ 08:11)  T(F): 97.6 (11-28-22 @ 08:11), Max: 97.6 (11-28-22 @ 08:11)  HR: --  BP: --  BP(mean): --  RR: --  SpO2: --    Orthostatic VS  11-28-22 @ 08:11  Lying BP: --/-- HR: --  Sitting BP: 116/65 HR: 71  Standing BP: --/-- HR: --  Site: --  Mode: --  
Vital Signs Last 24 Hrs  T(C): 36.7 (12-08-22 @ 08:27), Max: 36.7 (12-08-22 @ 08:27)  T(F): 98.1 (12-08-22 @ 08:27), Max: 98.1 (12-08-22 @ 08:27)  HR: --  BP: 111/62 (12-08-22 @ 08:27) (111/62 - 111/62)  BP(mean): 87 (12-08-22 @ 08:27) (87 - 87)  RR: --  SpO2: --    Orthostatic VS  12-07-22 @ 08:01  Lying BP: --/-- HR: --  Sitting BP: 103/65 HR: 84  Standing BP: --/-- HR: --  Site: --  Mode: --  
Vital Signs Last 24 Hrs  T(C): 36.6 (11-22-22 @ 08:25), Max: 36.6 (11-22-22 @ 08:25)  T(F): 97.9 (11-22-22 @ 08:25), Max: 97.9 (11-22-22 @ 08:25)  HR: --  BP: 107/63 (11-22-22 @ 08:25) (107/63 - 107/63)  BP(mean): 76 (11-22-22 @ 08:25) (76 - 76)  RR: --  SpO2: --

## 2022-12-20 NOTE — BH INPATIENT PSYCHIATRY PROGRESS NOTE - NSBHMSEGROOM_PSY_A_CORE
Fair
Poor
Fair
Poor
Fair
Poor
Fair

## 2022-12-20 NOTE — BH INPATIENT PSYCHIATRY PROGRESS NOTE - NSBHMSESPEECH_PSY_A_CORE
Normal volume, rate, productivity, spontaneity and articulation
Abnormal as indicated, otherwise normal...
Normal volume, rate, productivity, spontaneity and articulation
Abnormal as indicated, otherwise normal...
Normal volume, rate, productivity, spontaneity and articulation
Abnormal as indicated, otherwise normal...
Normal volume, rate, productivity, spontaneity and articulation

## 2022-12-20 NOTE — BH INPATIENT PSYCHIATRY DISCHARGE NOTE - NSDCPROCEDURESFT_PSY_ALL_CORE
loaded on invega sustenna:  first dose 234mg IM on 11/30/2022  second part of loading dose 156mg IM on 12/8/2022  makes first maintenance dose 156mg IM due on or around 1/5 or 1/6/2023  AOT order

## 2022-12-20 NOTE — BH INPATIENT PSYCHIATRY DISCHARGE NOTE - NSDCMRMEDTOKEN_GEN_ALL_CORE_FT
paliperidone 156 mg/mL intramuscular suspension, extended release: 156 milligram(s) intramuscularly once a month    paliperidone 156 mg/mL intramuscular suspension, extended release: 156 milligram(s) intramuscular every 4 weeks; (loaded 234 on 11/30/2022, 156 on 12/8/2022 and first maintenance dose due around 1/5/2022)

## 2022-12-20 NOTE — BH INPATIENT PSYCHIATRY PROGRESS NOTE - NSBHMSEMOVE_PSY_A_CORE
No abnormal movements
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
## 2022-12-20 NOTE — BH INPATIENT PSYCHIATRY PROGRESS NOTE - MSE OPTIONS
Structured MSE

## 2022-12-20 NOTE — BH INPATIENT PSYCHIATRY PROGRESS NOTE - NSBHMSEJUDGE_PSY_A_CORE
Poor
Other
Poor

## 2022-12-20 NOTE — BH INPATIENT PSYCHIATRY PROGRESS NOTE - NSBHMSEEYE_PSY_A_CORE
Fair

## 2022-12-20 NOTE — BH INPATIENT PSYCHIATRY PROGRESS NOTE - NSBHMSEGAIT_PSY_A_CORE
Normal gait / station

## 2022-12-20 NOTE — BH INPATIENT PSYCHIATRY PROGRESS NOTE - NSTXPROBMEDIC_PSY_ALL_CORE
MEDICATION/TREATMENT NON-COMPLIANCE

## 2022-12-20 NOTE — BH INPATIENT PSYCHIATRY PROGRESS NOTE - NSBHMSEPERCEPT_PSY_A_CORE
No abnormalities
Other
No abnormalities
Other
No abnormalities
Other
No abnormalities

## 2022-12-20 NOTE — BH INPATIENT PSYCHIATRY DISCHARGE NOTE - DESCRIPTION
Patient lives at home with mom, dad, and brother. Unemployed.  Was on the verge of graduating college but refused to sit for final of last exam.

## 2022-12-20 NOTE — BH INPATIENT PSYCHIATRY PROGRESS NOTE - NSBHMSEIMPULSE_PSY_A_CORE
Normal
Impaired
Normal

## 2022-12-20 NOTE — BH INPATIENT PSYCHIATRY PROGRESS NOTE - NSICDXBHSECONDARYDX_PSY_ALL_CORE
Cannabis abuse, daily use   F12.10  

## 2022-12-20 NOTE — BH INPATIENT PSYCHIATRY PROGRESS NOTE - NSBHMSETHTASSOC_PSY_A_CORE
Other
Other
Loose
Other
Loose
Other
Loose
Other
Other

## 2022-12-20 NOTE — BH INPATIENT PSYCHIATRY DISCHARGE NOTE - NSBHMETABOLIC_PSY_ALL_CORE_FT
BMI: BMI (kg/m2): 51.8 (11-01-22 @ 06:43)  HbA1c: A1C with Estimated Average Glucose Result: 4.9 % (11-01-22 @ 08:00)    Glucose:   BP: --  Lipid Panel: Date/Time: 11-02-22 @ 08:00  Cholesterol, Serum: 227  Direct LDL: --  HDL Cholesterol, Serum: 53  Total Cholesterol/HDL Ration Measurement: --  Triglycerides, Serum: 62

## 2022-12-20 NOTE — BH INPATIENT PSYCHIATRY DISCHARGE NOTE - LEGAL HISTORY
denies. no pending legal cases as per [facility] [employer]/ [employer] site

## 2022-12-20 NOTE — BH INPATIENT PSYCHIATRY PROGRESS NOTE - CURRENT MEDICATION
MEDICATIONS  (STANDING):  influenza   Vaccine 0.5 milliLiter(s) IntraMuscular once  paliperidone ER 9 milliGRAM(s) Oral at bedtime    MEDICATIONS  (PRN):  OLANZapine 5 milliGRAM(s) Oral every 6 hours PRN agitation  OLANZapine Injectable 5 milliGRAM(s) IntraMuscular Once PRN severe psychotic agitation  
MEDICATIONS  (STANDING):  influenza   Vaccine 0.5 milliLiter(s) IntraMuscular once  paliperidone ER 9 milliGRAM(s) Oral at bedtime    MEDICATIONS  (PRN):  OLANZapine 5 milliGRAM(s) Oral every 6 hours PRN agitation  OLANZapine Injectable 5 milliGRAM(s) IntraMuscular Once PRN severe psychotic agitation  
MEDICATIONS  (STANDING):  influenza   Vaccine 0.5 milliLiter(s) IntraMuscular once  risperiDONE   Solution 1 milliGRAM(s) Oral at bedtime    MEDICATIONS  (PRN):  LORazepam     Tablet 2 milliGRAM(s) Oral four times a day PRN severe anxiety related to psychosis  OLANZapine 5 milliGRAM(s) Oral every 6 hours PRN agitation  OLANZapine Injectable 5 milliGRAM(s) IntraMuscular Once PRN severe psychotic agitation  
MEDICATIONS  (STANDING):  influenza   Vaccine 0.5 milliLiter(s) IntraMuscular once  risperiDONE   Solution 2 milliGRAM(s) Oral at bedtime    MEDICATIONS  (PRN):  OLANZapine 5 milliGRAM(s) Oral every 6 hours PRN agitation  OLANZapine Injectable 5 milliGRAM(s) IntraMuscular Once PRN severe psychotic agitation  
MEDICATIONS  (STANDING):  influenza   Vaccine 0.5 milliLiter(s) IntraMuscular once    MEDICATIONS  (PRN):  diphenhydrAMINE Injectable 50 milliGRAM(s) IntraMuscular once PRN for acute agitation related to psychosis or dystonic reaction  OLANZapine 5 milliGRAM(s) Oral every 6 hours PRN agitation  OLANZapine Injectable 5 milliGRAM(s) IntraMuscular Once PRN severe psychotic agitation  
MEDICATIONS  (STANDING):  influenza   Vaccine 0.5 milliLiter(s) IntraMuscular once  risperiDONE   Solution 1 milliGRAM(s) Oral at bedtime    MEDICATIONS  (PRN):  LORazepam     Tablet 2 milliGRAM(s) Oral four times a day PRN severe anxiety related to psychosis  OLANZapine 5 milliGRAM(s) Oral every 6 hours PRN agitation  OLANZapine Injectable 5 milliGRAM(s) IntraMuscular Once PRN severe psychotic agitation  
MEDICATIONS  (STANDING):    MEDICATIONS  (PRN):  diphenhydrAMINE Injectable 50 milliGRAM(s) IntraMuscular once PRN for acute agitation related to psychosis or dystonic reaction  OLANZapine 5 milliGRAM(s) Oral every 6 hours PRN agitation  OLANZapine Injectable 5 milliGRAM(s) IntraMuscular Once PRN severe psychotic agitation  
MEDICATIONS  (STANDING):  influenza   Vaccine 0.5 milliLiter(s) IntraMuscular once    MEDICATIONS  (PRN):  diphenhydrAMINE Injectable 50 milliGRAM(s) IntraMuscular once PRN for acute agitation related to psychosis or dystonic reaction  OLANZapine 5 milliGRAM(s) Oral every 6 hours PRN agitation  OLANZapine Injectable 5 milliGRAM(s) IntraMuscular Once PRN severe psychotic agitation  
MEDICATIONS  (STANDING):  influenza   Vaccine 0.5 milliLiter(s) IntraMuscular once  risperiDONE   Solution 2 milliGRAM(s) Oral at bedtime    MEDICATIONS  (PRN):  OLANZapine 5 milliGRAM(s) Oral every 6 hours PRN agitation  OLANZapine Injectable 5 milliGRAM(s) IntraMuscular Once PRN severe psychotic agitation  
MEDICATIONS  (STANDING):  influenza   Vaccine 0.5 milliLiter(s) IntraMuscular once  paliperidone ER 9 milliGRAM(s) Oral at bedtime    MEDICATIONS  (PRN):  OLANZapine 5 milliGRAM(s) Oral every 6 hours PRN agitation  OLANZapine Injectable 5 milliGRAM(s) IntraMuscular Once PRN severe psychotic agitation  
MEDICATIONS  (STANDING):  influenza   Vaccine 0.5 milliLiter(s) IntraMuscular once  risperiDONE   Solution 4 milliGRAM(s) Oral at bedtime    MEDICATIONS  (PRN):  OLANZapine 5 milliGRAM(s) Oral every 6 hours PRN agitation  OLANZapine Injectable 5 milliGRAM(s) IntraMuscular Once PRN severe psychotic agitation  
MEDICATIONS  (STANDING):  influenza   Vaccine 0.5 milliLiter(s) IntraMuscular once    MEDICATIONS  (PRN):  diphenhydrAMINE Injectable 50 milliGRAM(s) IntraMuscular once PRN for acute agitation related to psychosis or dystonic reaction  OLANZapine 5 milliGRAM(s) Oral every 6 hours PRN agitation  OLANZapine Injectable 5 milliGRAM(s) IntraMuscular Once PRN severe psychotic agitation  
MEDICATIONS  (STANDING):  influenza   Vaccine 0.5 milliLiter(s) IntraMuscular once    MEDICATIONS  (PRN):  diphenhydrAMINE Injectable 50 milliGRAM(s) IntraMuscular once PRN for acute agitation related to psychosis or dystonic reaction  OLANZapine 5 milliGRAM(s) Oral every 6 hours PRN agitation  OLANZapine Injectable 5 milliGRAM(s) IntraMuscular Once PRN severe psychotic agitation  
MEDICATIONS  (STANDING):  influenza   Vaccine 0.5 milliLiter(s) IntraMuscular once  risperiDONE   Solution 3 milliGRAM(s) Oral at bedtime    MEDICATIONS  (PRN):  OLANZapine 5 milliGRAM(s) Oral every 6 hours PRN agitation  OLANZapine Injectable 5 milliGRAM(s) IntraMuscular Once PRN severe psychotic agitation  
MEDICATIONS  (STANDING):  influenza   Vaccine 0.5 milliLiter(s) IntraMuscular once    MEDICATIONS  (PRN):  diphenhydrAMINE Injectable 50 milliGRAM(s) IntraMuscular once PRN for acute agitation related to psychosis or dystonic reaction  OLANZapine 5 milliGRAM(s) Oral every 6 hours PRN agitation  OLANZapine Injectable 5 milliGRAM(s) IntraMuscular Once PRN severe psychotic agitation  
MEDICATIONS  (STANDING):  influenza   Vaccine 0.5 milliLiter(s) IntraMuscular once  risperiDONE   Solution 2 milliGRAM(s) Oral at bedtime    MEDICATIONS  (PRN):  LORazepam     Tablet 2 milliGRAM(s) Oral four times a day PRN severe anxiety related to psychosis  OLANZapine 5 milliGRAM(s) Oral every 6 hours PRN agitation  OLANZapine Injectable 5 milliGRAM(s) IntraMuscular Once PRN severe psychotic agitation  
MEDICATIONS  (STANDING):  influenza   Vaccine 0.5 milliLiter(s) IntraMuscular once  risperiDONE   Solution 2 milliGRAM(s) Oral at bedtime    MEDICATIONS  (PRN):  OLANZapine 5 milliGRAM(s) Oral every 6 hours PRN agitation  OLANZapine Injectable 5 milliGRAM(s) IntraMuscular Once PRN severe psychotic agitation  
MEDICATIONS  (STANDING):  influenza   Vaccine 0.5 milliLiter(s) IntraMuscular once    MEDICATIONS  (PRN):  diphenhydrAMINE Injectable 50 milliGRAM(s) IntraMuscular once PRN for acute agitation related to psychosis or dystonic reaction  OLANZapine 5 milliGRAM(s) Oral every 6 hours PRN agitation  OLANZapine Injectable 5 milliGRAM(s) IntraMuscular Once PRN severe psychotic agitation  
MEDICATIONS  (STANDING):  influenza   Vaccine 0.5 milliLiter(s) IntraMuscular once  risperiDONE   Solution 1 milliGRAM(s) Oral at bedtime    MEDICATIONS  (PRN):  LORazepam     Tablet 2 milliGRAM(s) Oral four times a day PRN severe anxiety related to psychosis  OLANZapine 5 milliGRAM(s) Oral every 6 hours PRN agitation  OLANZapine Injectable 5 milliGRAM(s) IntraMuscular Once PRN severe psychotic agitation  
MEDICATIONS  (STANDING):  influenza   Vaccine 0.5 milliLiter(s) IntraMuscular once  risperiDONE   Solution 3 milliGRAM(s) Oral at bedtime    MEDICATIONS  (PRN):  OLANZapine 5 milliGRAM(s) Oral every 6 hours PRN agitation  OLANZapine Injectable 5 milliGRAM(s) IntraMuscular Once PRN severe psychotic agitation  
MEDICATIONS  (STANDING):  influenza   Vaccine 0.5 milliLiter(s) IntraMuscular once  paliperidone ER 9 milliGRAM(s) Oral at bedtime    MEDICATIONS  (PRN):  OLANZapine 5 milliGRAM(s) Oral every 6 hours PRN agitation  OLANZapine Injectable 5 milliGRAM(s) IntraMuscular Once PRN severe psychotic agitation  
MEDICATIONS  (STANDING):    MEDICATIONS  (PRN):  diphenhydrAMINE Injectable 50 milliGRAM(s) IntraMuscular once PRN for acute agitation related to psychosis or dystonic reaction  OLANZapine 5 milliGRAM(s) Oral every 6 hours PRN agitation  OLANZapine Injectable 5 milliGRAM(s) IntraMuscular Once PRN severe psychotic agitation  
MEDICATIONS  (STANDING):  influenza   Vaccine 0.5 milliLiter(s) IntraMuscular once    MEDICATIONS  (PRN):  diphenhydrAMINE Injectable 50 milliGRAM(s) IntraMuscular once PRN for acute agitation related to psychosis or dystonic reaction  OLANZapine 5 milliGRAM(s) Oral every 6 hours PRN agitation  OLANZapine Injectable 5 milliGRAM(s) IntraMuscular Once PRN severe psychotic agitation  
MEDICATIONS  (STANDING):  influenza   Vaccine 0.5 milliLiter(s) IntraMuscular once  risperiDONE   Solution 2 milliGRAM(s) Oral at bedtime    MEDICATIONS  (PRN):  LORazepam     Tablet 2 milliGRAM(s) Oral four times a day PRN severe anxiety related to psychosis  OLANZapine 5 milliGRAM(s) Oral every 6 hours PRN agitation  OLANZapine Injectable 5 milliGRAM(s) IntraMuscular Once PRN severe psychotic agitation  
MEDICATIONS  (STANDING):  influenza   Vaccine 0.5 milliLiter(s) IntraMuscular once  risperiDONE   Solution 2 milliGRAM(s) Oral at bedtime    MEDICATIONS  (PRN):  LORazepam     Tablet 2 milliGRAM(s) Oral four times a day PRN severe anxiety related to psychosis  OLANZapine 5 milliGRAM(s) Oral every 6 hours PRN agitation  OLANZapine Injectable 5 milliGRAM(s) IntraMuscular Once PRN severe psychotic agitation  
MEDICATIONS  (STANDING):  influenza   Vaccine 0.5 milliLiter(s) IntraMuscular once  paliperidone ER 9 milliGRAM(s) Oral at bedtime    MEDICATIONS  (PRN):  OLANZapine 5 milliGRAM(s) Oral every 6 hours PRN agitation  OLANZapine Injectable 5 milliGRAM(s) IntraMuscular Once PRN severe psychotic agitation  
MEDICATIONS  (STANDING):  influenza   Vaccine 0.5 milliLiter(s) IntraMuscular once  risperiDONE   Solution 1 milliGRAM(s) Oral at bedtime    MEDICATIONS  (PRN):  LORazepam     Tablet 2 milliGRAM(s) Oral four times a day PRN severe anxiety related to psychosis  OLANZapine 5 milliGRAM(s) Oral every 6 hours PRN agitation  OLANZapine Injectable 5 milliGRAM(s) IntraMuscular Once PRN severe psychotic agitation  
MEDICATIONS  (STANDING):  influenza   Vaccine 0.5 milliLiter(s) IntraMuscular once    MEDICATIONS  (PRN):  diphenhydrAMINE Injectable 50 milliGRAM(s) IntraMuscular once PRN for acute agitation related to psychosis or dystonic reaction  OLANZapine 5 milliGRAM(s) Oral every 6 hours PRN agitation  OLANZapine Injectable 5 milliGRAM(s) IntraMuscular Once PRN severe psychotic agitation  
MEDICATIONS  (STANDING):  influenza   Vaccine 0.5 milliLiter(s) IntraMuscular once  risperiDONE   Solution 2 milliGRAM(s) Oral at bedtime    MEDICATIONS  (PRN):  OLANZapine 5 milliGRAM(s) Oral every 6 hours PRN agitation  OLANZapine Injectable 5 milliGRAM(s) IntraMuscular Once PRN severe psychotic agitation  
MEDICATIONS  (STANDING):  influenza   Vaccine 0.5 milliLiter(s) IntraMuscular once    MEDICATIONS  (PRN):  diphenhydrAMINE Injectable 50 milliGRAM(s) IntraMuscular once PRN for acute agitation related to psychosis or dystonic reaction  OLANZapine 5 milliGRAM(s) Oral every 6 hours PRN agitation  OLANZapine Injectable 5 milliGRAM(s) IntraMuscular Once PRN severe psychotic agitation  
MEDICATIONS  (STANDING):  influenza   Vaccine 0.5 milliLiter(s) IntraMuscular once  paliperidone ER 9 milliGRAM(s) Oral at bedtime    MEDICATIONS  (PRN):  OLANZapine 5 milliGRAM(s) Oral every 6 hours PRN agitation  OLANZapine Injectable 5 milliGRAM(s) IntraMuscular Once PRN severe psychotic agitation  
MEDICATIONS  (STANDING):  influenza   Vaccine 0.5 milliLiter(s) IntraMuscular once  risperiDONE   Solution 3 milliGRAM(s) Oral at bedtime    MEDICATIONS  (PRN):  OLANZapine 5 milliGRAM(s) Oral every 6 hours PRN agitation  OLANZapine Injectable 5 milliGRAM(s) IntraMuscular Once PRN severe psychotic agitation

## 2022-12-20 NOTE — BH INPATIENT PSYCHIATRY DISCHARGE NOTE - NSBHDCRISKMITIGATE_PSY_ALL_CORE
Safety planning/Referral to ACT Team/Referral to case management/Long acting injectable medication/Assisted outpatient treatment Safety planning/Referral to PROS/Referral to ACT Team/Referral to case management/Long acting injectable medication/Assisted outpatient treatment

## 2023-03-14 NOTE — ED BEHAVIORAL HEALTH ASSESSMENT NOTE - NS ED BHA BENZODIAZEPINES
"Routing refill request to provider for review/approval because:  Blood pressure failed  Early refill request    Last Written Prescription Date:  6/15/2022  Last Fill Quantity: 90,  # refills: 2   Last office visit provider:  2/8/2023     Requested Prescriptions   Pending Prescriptions Disp Refills     metoprolol succinate ER (TOPROL XL) 50 MG 24 hr tablet [Pharmacy Med Name: METOPROLOL SUCC ER 50 MG TAB] 90 tablet 2     Sig: TAKE 1 TABLET BY MOUTH EVERY DAY       Beta-Blockers Protocol Passed - 3/14/2023  2:10 PM        Passed - Blood pressure under 140/90 in past 12 months     BP Readings from Last 3 Encounters:   02/08/23 120/80   11/11/22 (!) 171/87   10/28/22 126/68                 Passed - Patient is age 6 or older        Passed - Recent (12 mo) or future (30 days) visit within the authorizing provider's specialty     Patient has had an office visit with the authorizing provider or a provider within the authorizing providers department within the previous 12 mos or has a future within next 30 days. See \"Patient Info\" tab in [employer], or \"Choose Columns\" in Meds & Orders section of the refill encounter.              Passed - Medication is active on med list        Last Written Prescription Date:  10/7/2022  Last Fill Quantity: 180,  # refills: 1   Last office visit provider:  2/8/2023        busPIRone (BUSPAR) 15 MG tablet [Pharmacy Med Name: BUSPIRONE HCL 15 MG TABLET] 180 tablet 1     Sig: TAKE 1 TABLET BY MOUTH TWICE A DAY       Atypical Antidepressants Protocol Passed - 3/14/2023  2:09 PM        Passed - Recent (12 mo) or future (30 days) visit within the authorizing provider's specialty     Patient has had an office visit with the authorizing provider or a provider within the authorizing providers department within the previous 12 mos or has a future within next 30 days. See \"Patient Info\" tab in inbasket, or \"Choose Columns\" in Meds & Orders section of the refill encounter.              Passed - Medication " active on med list        Passed - Patient is age 18 or older        Passed - No active pregnancy on record        Passed - No positive pregnancy test in past 12 mos             Kelley Bran RN 03/14/23 2:10 PM   None known

## 2023-04-27 NOTE — ED BEHAVIORAL HEALTH ASSESSMENT NOTE - NS ED BHA REVIEW OF ED CHART AVAILABLE IMAGING REVIEWED
None available Double O-Z Plasty Text: The defect edges were debeveled with a #15 scalpel blade.  Given the location of the defect, shape of the defect and the proximity to free margins a Double O-Z plasty (double transposition flap) was deemed most appropriate.  Using a sterile surgical marker, the appropriate transposition flaps were drawn incorporating the defect and placing the expected incisions within the relaxed skin tension lines where possible. The area thus outlined was incised deep to adipose tissue with a #15 scalpel blade.  The skin margins were undermined to an appropriate distance in all directions utilizing iris scissors.  Hemostasis was achieved with electrocautery.  The flaps were then transposed into place, one clockwise and the other counterclockwise, and anchored with interrupted buried subcutaneous sutures.

## 2023-10-27 NOTE — ED PROVIDER NOTE - EMPLOYMENT
Mammogram benign.  Recheck 1 year
Please notify the patient of normal results.  Follow-up as planned.  Recheck in 1 year
Unemployed

## 2024-05-28 NOTE — ED PROVIDER NOTE - CPE EDP MUSC NORM
[FreeTextEntry1] : APA spot left thumb [de-identified] : 85 year old APA painful spot left thumb. no tx tried. also spot on right temple. enlarging. normal...

## 2024-08-09 ENCOUNTER — INPATIENT (INPATIENT)
Facility: HOSPITAL | Age: 29
LOS: 19 days | Discharge: HOME | End: 2024-08-29
Attending: STUDENT IN AN ORGANIZED HEALTH CARE EDUCATION/TRAINING PROGRAM | Admitting: STUDENT IN AN ORGANIZED HEALTH CARE EDUCATION/TRAINING PROGRAM
Payer: MEDICAID

## 2024-08-09 VITALS
TEMPERATURE: 99 F | WEIGHT: 165.35 LBS | DIASTOLIC BLOOD PRESSURE: 84 MMHG | OXYGEN SATURATION: 100 % | RESPIRATION RATE: 16 BRPM | SYSTOLIC BLOOD PRESSURE: 147 MMHG | HEART RATE: 97 BPM

## 2024-08-09 LAB
ALBUMIN SERPL ELPH-MCNC: 4.7 G/DL — SIGNIFICANT CHANGE UP (ref 3.3–5)
ALP SERPL-CCNC: 55 U/L — SIGNIFICANT CHANGE UP (ref 40–120)
ALT FLD-CCNC: 15 U/L — SIGNIFICANT CHANGE UP (ref 4–41)
AMPHET UR-MCNC: NEGATIVE — SIGNIFICANT CHANGE UP
ANION GAP SERPL CALC-SCNC: 12 MMOL/L — SIGNIFICANT CHANGE UP (ref 7–14)
APAP SERPL-MCNC: <10 UG/ML — LOW (ref 15–25)
APPEARANCE UR: CLEAR — SIGNIFICANT CHANGE UP
AST SERPL-CCNC: 21 U/L — SIGNIFICANT CHANGE UP (ref 4–40)
BARBITURATES UR SCN-MCNC: NEGATIVE — SIGNIFICANT CHANGE UP
BASOPHILS # BLD AUTO: 0.03 K/UL — SIGNIFICANT CHANGE UP (ref 0–0.2)
BASOPHILS NFR BLD AUTO: 0.4 % — SIGNIFICANT CHANGE UP (ref 0–2)
BENZODIAZ UR-MCNC: NEGATIVE — SIGNIFICANT CHANGE UP
BILIRUB SERPL-MCNC: 0.4 MG/DL — SIGNIFICANT CHANGE UP (ref 0.2–1.2)
BILIRUB UR-MCNC: NEGATIVE — SIGNIFICANT CHANGE UP
BUN SERPL-MCNC: 15 MG/DL — SIGNIFICANT CHANGE UP (ref 7–23)
CALCIUM SERPL-MCNC: 9.5 MG/DL — SIGNIFICANT CHANGE UP (ref 8.4–10.5)
CHLORIDE SERPL-SCNC: 103 MMOL/L — SIGNIFICANT CHANGE UP (ref 98–107)
CO2 SERPL-SCNC: 24 MMOL/L — SIGNIFICANT CHANGE UP (ref 22–31)
COCAINE METAB.OTHER UR-MCNC: NEGATIVE — SIGNIFICANT CHANGE UP
COLOR SPEC: YELLOW — SIGNIFICANT CHANGE UP
CREAT SERPL-MCNC: 1.31 MG/DL — HIGH (ref 0.5–1.3)
CREATININE URINE RESULT, DAU: 221 MG/DL — SIGNIFICANT CHANGE UP
DIFF PNL FLD: NEGATIVE — SIGNIFICANT CHANGE UP
EGFR: 77 ML/MIN/1.73M2 — SIGNIFICANT CHANGE UP
EOSINOPHIL # BLD AUTO: 0.08 K/UL — SIGNIFICANT CHANGE UP (ref 0–0.5)
EOSINOPHIL NFR BLD AUTO: 1 % — SIGNIFICANT CHANGE UP (ref 0–6)
ETHANOL SERPL-MCNC: <10 MG/DL — SIGNIFICANT CHANGE UP
FENTANYL UR QL SCN: NEGATIVE — SIGNIFICANT CHANGE UP
GLUCOSE SERPL-MCNC: 104 MG/DL — HIGH (ref 70–99)
GLUCOSE UR QL: NEGATIVE MG/DL — SIGNIFICANT CHANGE UP
HCT VFR BLD CALC: 44.9 % — SIGNIFICANT CHANGE UP (ref 39–50)
HGB BLD-MCNC: 14.9 G/DL — SIGNIFICANT CHANGE UP (ref 13–17)
IANC: 6.07 K/UL — SIGNIFICANT CHANGE UP (ref 1.8–7.4)
IMM GRANULOCYTES NFR BLD AUTO: 0.4 % — SIGNIFICANT CHANGE UP (ref 0–0.9)
KETONES UR-MCNC: ABNORMAL MG/DL
LEUKOCYTE ESTERASE UR-ACNC: NEGATIVE — SIGNIFICANT CHANGE UP
LYMPHOCYTES # BLD AUTO: 1 K/UL — SIGNIFICANT CHANGE UP (ref 1–3.3)
LYMPHOCYTES # BLD AUTO: 13.1 % — SIGNIFICANT CHANGE UP (ref 13–44)
MCHC RBC-ENTMCNC: 27.2 PG — SIGNIFICANT CHANGE UP (ref 27–34)
MCHC RBC-ENTMCNC: 33.2 GM/DL — SIGNIFICANT CHANGE UP (ref 32–36)
MCV RBC AUTO: 82.1 FL — SIGNIFICANT CHANGE UP (ref 80–100)
METHADONE UR-MCNC: NEGATIVE — SIGNIFICANT CHANGE UP
MONOCYTES # BLD AUTO: 0.44 K/UL — SIGNIFICANT CHANGE UP (ref 0–0.9)
MONOCYTES NFR BLD AUTO: 5.8 % — SIGNIFICANT CHANGE UP (ref 2–14)
NEUTROPHILS # BLD AUTO: 6.07 K/UL — SIGNIFICANT CHANGE UP (ref 1.8–7.4)
NEUTROPHILS NFR BLD AUTO: 79.3 % — HIGH (ref 43–77)
NITRITE UR-MCNC: NEGATIVE — SIGNIFICANT CHANGE UP
NRBC # BLD: 0 /100 WBCS — SIGNIFICANT CHANGE UP (ref 0–0)
NRBC # FLD: 0 K/UL — SIGNIFICANT CHANGE UP (ref 0–0)
OPIATES UR-MCNC: NEGATIVE — SIGNIFICANT CHANGE UP
OXYCODONE UR-MCNC: NEGATIVE — SIGNIFICANT CHANGE UP
PCP SPEC-MCNC: SIGNIFICANT CHANGE UP
PCP UR-MCNC: NEGATIVE — SIGNIFICANT CHANGE UP
PH UR: 6.5 — SIGNIFICANT CHANGE UP (ref 5–8)
PLATELET # BLD AUTO: 201 K/UL — SIGNIFICANT CHANGE UP (ref 150–400)
POTASSIUM SERPL-MCNC: 3.9 MMOL/L — SIGNIFICANT CHANGE UP (ref 3.5–5.3)
POTASSIUM SERPL-SCNC: 3.9 MMOL/L — SIGNIFICANT CHANGE UP (ref 3.5–5.3)
PROT SERPL-MCNC: 7.8 G/DL — SIGNIFICANT CHANGE UP (ref 6–8.3)
PROT UR-MCNC: SIGNIFICANT CHANGE UP MG/DL
RBC # BLD: 5.47 M/UL — SIGNIFICANT CHANGE UP (ref 4.2–5.8)
RBC # FLD: 12.7 % — SIGNIFICANT CHANGE UP (ref 10.3–14.5)
SALICYLATES SERPL-MCNC: <0.3 MG/DL — LOW (ref 15–30)
SARS-COV-2 RNA SPEC QL NAA+PROBE: SIGNIFICANT CHANGE UP
SODIUM SERPL-SCNC: 139 MMOL/L — SIGNIFICANT CHANGE UP (ref 135–145)
SP GR SPEC: 1.02 — SIGNIFICANT CHANGE UP (ref 1–1.03)
THC UR QL: POSITIVE
TOXICOLOGY SCREEN, DRUGS OF ABUSE, SERUM RESULT: SIGNIFICANT CHANGE UP
TSH SERPL-MCNC: 1.53 UIU/ML — SIGNIFICANT CHANGE UP (ref 0.27–4.2)
UROBILINOGEN FLD QL: 0.2 MG/DL — SIGNIFICANT CHANGE UP (ref 0.2–1)
WBC # BLD: 7.65 K/UL — SIGNIFICANT CHANGE UP (ref 3.8–10.5)
WBC # FLD AUTO: 7.65 K/UL — SIGNIFICANT CHANGE UP (ref 3.8–10.5)

## 2024-08-09 PROCEDURE — 99285 EMERGENCY DEPT VISIT HI MDM: CPT

## 2024-08-09 NOTE — ED PROVIDER NOTE - DISPOSITION TYPE
Cervical MRI (11/9/22, [facility]) listed in Care Everywhere but no report. Writer called patient to find out if she had imaging and when/where it was completed so our office can request it prior to scheduling. Patient did not answer - a detailed voicemail was left asking for a call back.   ADMIT

## 2024-08-09 NOTE — ED PROVIDER NOTE - CHPI ED SYMPTOMS POS
p/t c/o of dysuria for 2 days, and lower back pain  denies any nausea or vomiting
ACTING OUT BEHAVIORS/AGITATION/ANXIETY/PARANOIA
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
## 2024-08-09 NOTE — ED BEHAVIORAL HEALTH ASSESSMENT NOTE - HPI (INCLUDE ILLNESS QUALITY, SEVERITY, DURATION, TIMING, CONTEXT, MODIFYING FACTORS, ASSOCIATED SIGNS AND SYMPTOMS)
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 with hx of schizophrenia vs schizoaffective disorder, substance use (daily alcohol and cannabis use), non adherent to meds and psych aftercare, with prior hx of psych admissions including (last [facility] L3 11/1/20222-12/20/2022), was on AOT (discontinued Jan 2024). Tonight, presented to the ED BIB EMS activated by parents due to erratic behavior; agitated and displaying bizarre behavior at home.     Patient evaluated at bedside. He is oddly-related, vague, and evasive although superficially cooperative. He states that today he drank a beer while out with coworkers ("As is my god-given right") and states there were "eyes on him" and colleagues reported him to his parents. States his mother calls EMS on him often. Patient has difficulty staying on topic, begins to discuss working with metal stating "I can melt iron to make anything I want to, anything my heart desires!" He denies all psychiatric symptoms and denies all psychiatric history, stating he was hospitalized in past for "being a child" rather than psychosis. Patient does not respond to questions regarding sleep, appetite, and ADLs ("I sleep when I'm tired"). Patient states he used to take Invega and stopped as soon as he was no longer forced to take it. Endorses 1-2 beers daily, cannabis daily. Denies nicotine, cocaine, amphetamine, prescription pill use. Evasive about psychedelic use.     He is seen bedside.  oted to attempt at minimizing symptoms; superficially cooperative. claims that his current ED admission is "all but a misunderstanding".. Endorsed that he does not know why he was brought here at the ED and later on, does not understand why his father called the police on him. 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 with hx of schizophrenia vs schizoaffective disorder, substance use (daily alcohol and cannabis use), non adherent to meds and psych aftercare, with prior hx of psych admissions including (last [facility] L3 11/1/20222-12/20/2022), was on AOT (discontinued Jan 2024). Tonight, presented to the ED BIB EMS activated by parents due to erratic behavior; agitated and displaying bizarre behavior at home.     Patient evaluated at bedside. He is oddly-related, vague, and evasive although superficially cooperative. He states that today he drank a beer while out with coworkers ("As is my god-given right") and states there were "eyes on him" and colleagues reported him to his parents. States his mother calls EMS on him often. Patient has difficulty staying on topic, begins to discuss working with metal stating "I can melt iron to make anything I want to, anything my heart desires!" He denies all psychiatric symptoms and denies all psychiatric history, stating he was hospitalized in past for "being a child" rather than psychosis. Patient does not respond to questions regarding sleep, appetite, and ADLs ("I sleep when I'm tired"). Patient states he used to take Invega and stopped as soon as he was no longer forced to take it. Endorses 1-2 beers daily, cannabis daily. Denies nicotine, cocaine, amphetamine, prescription pill use. Evasive about psychedelic use. Patient denies psychotic symptoms, including auditory hallucinations, paranoia. Denies any current symptoms of sujey, including decreased need for sleep, persistent elevated and/or irritable mood, decreased goal-directed behavior, and increase in reckless behaviors. Denies depressive symptoms, including low mood, anhedonia, hopelessness, and S/I. No current or history of self-injurious urges or behaviors. No VI or HI. No substance use. No access to guns or other weapons. Patient states his father has guns but denies any access to them.    Please see  note for full collateral. Briefly, per family patient has been more agitated lately, with worsening sleep and appetite. Today, pt jumped on furniture screaming, also recently kicked a door down. 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 with hx of schizophrenia vs schizoaffective disorder, substance use (daily alcohol and cannabis use), non adherent to meds and psych aftercare, with prior hx of psych admissions including (last [facility] L3 11/1/20222-12/20/2022), was on AOT (discontinued Jan 2024). Tonight, presented to the ED BIB EMS activated by parents due to erratic behavior; agitated and displaying bizarre behavior at home.     Patient evaluated at bedside. He is oddly-related, vague, and evasive although superficially cooperative. He states that today he drank a beer while out with coworkers ("As is my god-given right") and states there were "eyes on him" and colleagues reported him to his parents. States his mother calls EMS on him often. Patient has difficulty staying on topic, begins to discuss working with metal stating "I can melt iron to make anything I want to, anything my heart desires!" He denies all psychiatric symptoms and denies all psychiatric history, stating he was hospitalized in past for "being a child" rather than psychosis. Patient does not respond to questions regarding sleep, appetite, and ADLs ("I sleep when I'm tired"). Patient states he used to take Invega and stopped as soon as he was no longer forced to take it. Endorses 1-2 beers daily, cannabis daily. Denies nicotine, cocaine, amphetamine, prescription pill use. Evasive about psychedelic use. Patient denies psychotic symptoms, including auditory hallucinations, paranoia. Denies any current symptoms of sujey, including decreased need for sleep, persistent elevated and/or irritable mood, decreased goal-directed behavior, and increase in reckless behaviors. Denies depressive symptoms, including low mood, anhedonia, hopelessness, and S/I. No current or history of self-injurious urges or behaviors. No VI or HI. No substance use. No access to guns or other weapons. Patient states his father has guns but denies any access to them.    Please see  note for full collateral. Briefly, per family patient has been more agitated lately, with worsening sleep and appetite. Today, pt jumped on furniture screaming, also recently kicked a door down. Patient has also been reporting command auditory hallucinations to family (pt denies this). Family states patient is not at baseline and is advocating for admission.

## 2024-08-09 NOTE — ED BEHAVIORAL HEALTH ASSESSMENT NOTE - NSBHATTESTCOMMENTATTENDFT_PSY_A_CORE
Patient is a 26 y o male, domiciled, single, employed at group home, with past psychiatric hx of schizophrenia vs schizoaffective disorder, substance use (daily alcohol and cannabis use), with prior hx of inpatient psychiatric admissions (last [facility] L3 11/1/20222-12/20/2022), previously on AOT (discontinued Jan 2024), non adherent to medications and psychiatric outpatient follow up, no history of suicide attempts, who was BIB EMS activated by parents due to erratic behavior, agitation and displaying bizarre behavior at home. Patient is a 26 y o male, domiciled, single, employed at group home, with past psychiatric hx of schizophrenia vs schizoaffective disorder, substance use (daily alcohol and cannabis use), with prior hx of inpatient psychiatric admissions (last [facility] L3 11/1/20222-12/20/2022), previously on AOT (discontinued Jan 2024), non adherent to medications and psychiatric outpatient follow up, no history of suicide attempts, who was BIB EMS activated by parents due to erratic behavior, agitation and displaying bizarre behavior at home.    On evaluation, patient denies sx of acute sujey, psychosis, depression, SI/HI, however, presents with tangential and disorganized thought process, hyperverbal, w/ poor insight and judgement, and collateral significant for erratic and disorganized behavior at home, with worsening sleep and appetite, endorsed CAH at home, agitated as of late w aggression towards property the day prior to arrival to the ED and not at baseline, in context of medication and outpatient tx non adherence.  Patient's sx at this time appear to be him at risk for substantial harm and meet criteria for involuntary inpatient psychiatric hospitalization for stabilization and medication restart and optimization.
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
## 2024-08-09 NOTE — ED ADULT NURSE NOTE - NSFALLUNIVINTERV_ED_ALL_ED
Bed/Stretcher in lowest position, wheels locked, appropriate side rails in place/Call bell, personal items and telephone in reach/Instruct patient to call for assistance before getting out of bed/chair/stretcher/Non-slip footwear applied when patient is off stretcher/[place] to call system/Physically safe environment - no spills, clutter or unnecessary equipment/Purposeful proactive rounding/Room/bathroom lighting operational, light cord in reach

## 2024-08-09 NOTE — ED PROVIDER NOTE - CLINICAL SUMMARY MEDICAL DECISION MAKING FREE TEXT BOX
28 y/o M   BIBA secondary to aggression and threatening  behaviours .  Appears paranoid and internally preoccupied.  .   Denies falling, punching or kicking any objects. Denies /HI/AH/VH. .  Denies use of alochol or illicit drugs. . Denies pain, SOB, fever, chills, chest /abdominal discomfort.      Labs, Urine Tox/UA, EKG  Medical evaluation performed. There is no clinical evidence of intoxication or any acute medical problem requiring immediate intervention. Patient is awaiting psychiatric consultation. Final disposition will be determined by psychiatrist.
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
## 2024-08-09 NOTE — ED BEHAVIORAL HEALTH ASSESSMENT NOTE - SUMMARY
Assessment/Plan:    Plan:  1.	Legals: admit on 9.27  2.	Safety: routine observation, denies SI/HI/I/P on the unit. PRNs: Ativan/Haldol/Benadryl PO/IM  Of note, patient states he has a gun license, would verify that patient is SAFE acted prior to discharge  3.	Psychiatry:  4.	Group, milieu, individual therapy as appropriate.  5.	Medical: no acute medical issues, no significant PMH.  Admission labs WNL.  6.	Dispo: pending clinical improvement.  Patient continues to require inpatient hospitalization for stabilization and safety. 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 with hx of schizophrenia vs schizoaffective disorder, substance use (daily alcohol and cannabis use), non adherent to meds and psych aftercare, with prior hx of psych admissions including (last [facility] L3 11/1/20222-12/20/2022), was on AOT (discontinued Jan 2024). Tonight, presented to the ED BIB EMS activated by parents due to erratic behavior; agitated and displaying bizarre behavior at home.     Patient evaluated at bedside. He is oddly-related, vague, and evasive although superficially cooperative. He states that today he drank a beer while out with coworkers ("As is my god-given right") and states there were "eyes on him" and colleagues reported him to his parents. States his mother calls EMS on him often. Patient has difficulty staying on topic, begins to discuss working with metal stating "I can melt iron to make anything I want to, anything my heart desires!" He denies all psychiatric symptoms and denies all psychiatric history, stating he was hospitalized in past for "being a child" rather than psychosis. Patient does not respond to questions regarding sleep, appetite, and ADLs ("I sleep when I'm tired"). Patient states he used to take Invega and stopped as soon as he was no longer forced to take it. Endorses 1-2 beers daily, cannabis daily. Denies nicotine, cocaine, amphetamine, prescription pill use. Evasive about psychedelic use. Patient denies psychotic symptoms, including auditory hallucinations, paranoia. Denies any current symptoms of sujey, including decreased need for sleep, persistent elevated and/or irritable mood, decreased goal-directed behavior, and increase in reckless behaviors. Denies depressive symptoms, including low mood, anhedonia, hopelessness, and S/I. No current or history of self-injurious urges or behaviors. No VI or HI. No substance use. No access to guns or other weapons. Patient states his father has guns but denies any access to them.    Please see  note for full collateral. Briefly, per family patient has been more agitated lately, with worsening sleep and appetite. Today, pt jumped on furniture screaming, also recently kicked a door down. Patient has also been reporting command auditory hallucinations to family (pt denies this). Family states patient is not at baseline and is advocating for admission.    Plan:  1.	Legals: admit on 9.27  2.	Safety: routine observation, denies SI/HI/I/P on the unit. PRNs: Ativan/Haldol/Benadryl PO/IM  Of note, patient states he has a gun license, would verify that patient is SAFE acted prior to discharge  3.	Psychiatry:  4.	Group, milieu, individual therapy as appropriate.  5.	Medical: no acute medical issues, no significant PMH.  Admission labs WNL.  6.	Dispo: pending clinical improvement.  Patient continues to require inpatient hospitalization for stabilization and safety. 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 with hx of schizophrenia vs schizoaffective disorder, substance use (daily alcohol and cannabis use), non adherent to meds and psych aftercare, with prior hx of psych admissions including (last [facility] L3 11/1/20222-12/20/2022), was on AOT (discontinued Jan 2024). Tonight, presented to the ED BIB EMS activated by parents due to erratic behavior; agitated and displaying bizarre behavior at home. On eval, patient is oddly-related, vague, and evasive although superficially cooperative, with tangential thought process and over productive speech. Reports medication nonadherent, use of 1-2 beers daily and cannabis daily. Patient denies all psych symptoms and history, showing poor insight into his illness. Per family patient has been more agitated lately, with worsening sleep and appetite. Today, pt jumped on furniture screaming, also recently kicked a door down. Patient has also been reporting command auditory hallucinations to family (pt denies this). Family states patient is not at baseline and is advocating for admission.    Plan:  1.	Legals: admit on 9.27  2.	Safety: routine observation, denies SI/HI/I/P on the unit. PRNs: Ativan/Haldol/Benadryl PO/IM  Of note, patient states he has a gun license, would verify that patient is SAFE acted prior to discharge  3.	Psychiatry: start paliperidone   4.	Group, milieu, individual therapy as appropriate.  5.	Medical: no acute medical issues, no significant PMH.  Admission labs WNL.  6.	Dispo: pending clinical improvement.  Patient continues to require inpatient hospitalization for stabilization and safety. 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 with hx of schizophrenia vs schizoaffective disorder, substance use (daily alcohol and cannabis use), non adherent to meds and psych aftercare, with prior hx of psych admissions including (last [facility] L3 11/1/20222-12/20/2022), was on AOT (discontinued Jan 2024). Tonight, presented to the ED BIB EMS activated by parents due to erratic behavior; agitated and displaying bizarre behavior at home. On eval, patient is oddly-related, vague, and evasive although superficially cooperative, with tangential thought process and over productive speech. Reports medication nonadherent, use of 1-2 beers daily and cannabis daily. Patient denies all psych symptoms and history, showing poor insight into his illness. Per family patient has been more agitated lately, with worsening sleep and appetite. Today, pt jumped on furniture screaming, also recently kicked a door down. Patient has also been reporting command auditory hallucinations to family (pt denies this). Family states patient is not at baseline and is advocating for admission.    Plan:  1.	Legals: admit on 9.27  2.	Safety: routine observation, denies SI/HI/I/P on the unit. PRNs: Zyprexa 5mg PO/IM. Avoiding Haldol due to listed intolerance (EPS).  Of note, patient states he has a gun license, would verify that patient is SAFE acted prior to discharge  3.	Psychiatry: start risperidone 2 mg  4.	Group, milieu, individual therapy as appropriate.  5.	Medical: no acute medical issues, no significant PMH.  Admission labs WNL.  6.	Dispo: pending clinical improvement.  Patient continues to require inpatient hospitalization for stabilization and safety. 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 with hx of schizophrenia vs schizoaffective disorder, substance use (daily alcohol and cannabis use), non adherent to meds and psych aftercare, with prior hx of psych admissions including (last [facility] L3 11/1/20222-12/20/2022), was on AOT (discontinued Jan 2024). Tonight, presented to the ED BIB EMS activated by parents due to erratic behavior; agitated and displaying bizarre behavior at home. On eval, patient is oddly-related, vague, and evasive although superficially cooperative, with tangential thought process and over productive speech. Reports medication nonadherent, use of 1-2 beers daily and cannabis daily. Patient denies all psych symptoms and history, showing poor insight into his illness. Per family patient has been more agitated lately, with worsening sleep and appetite. Today, pt jumped on furniture screaming, also recently kicked a door down. Patient has also been reporting command auditory hallucinations to family (pt denies this). Family states patient is not at baseline and is advocating for admission.    Plan:  1.	Legals: admit on 9.27  2.	Safety: routine observation, denies SI/HI/I/P on the unit. PRNs: Zyprexa 5mg PO/IM. Avoiding Haldol due to listed intolerance (EPS).  Of note, patient states he has a gun license, would verify that patient is SAFE acted prior to discharge  3.	Psychiatry: start risperidone 1 mg  4.	Group, milieu, individual therapy as appropriate.  5.	Medical: no acute medical issues, no significant PMH.  Admission labs WNL.  6.	Dispo: pending clinical improvement.  Patient continues to require inpatient hospitalization for stabilization and safety. 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 with hx of schizophrenia vs schizoaffective disorder, substance use (daily alcohol and cannabis use), non adherent to meds and psych aftercare, with prior hx of psych admissions including (last [facility] L3 11/1/20222-12/20/2022), was on AOT (discontinued Jan 2024). Tonight, presented to the ED BIB EMS activated by parents due to erratic behavior; agitated and displaying bizarre behavior at home. On eval, patient is oddly-related, vague, and evasive although superficially cooperative, with tangential thought process and over productive speech. Reports medication nonadherent, use of 1-2 beers daily and cannabis daily. Patient denies all psych symptoms and history, showing poor insight into his illness. Per family patient has been more agitated lately, with worsening sleep and appetite. Today, pt jumped on furniture screaming, also recently kicked a door down. Patient has also been reporting command auditory hallucinations to family (pt denies this). Family states patient is not at baseline and is advocating for admission. Updated patient's mother on disposition and answered questions.    Plan:  1.	Legals: admit on 9.27  2.	Safety: routine observation, denies SI/HI/I/P on the unit. PRNs: Zyprexa 5mg PO/IM. Avoiding Haldol due to listed intolerance (EPS).  Of note, patient states he has a gun license, would verify that patient is SAFE acted prior to discharge  3.	Psychiatry: start risperidone 1 mg  4.	Group, milieu, individual therapy as appropriate.  5.	Medical: no acute medical issues, no significant PMH.  Admission labs WNL.  6.	Dispo: pending clinical improvement.  Patient continues to require inpatient hospitalization for stabilization and safety.
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
## 2024-08-09 NOTE — ED BEHAVIORAL HEALTH ASSESSMENT NOTE - VIOLENCE RISK FACTORS:
Substance abuse/Affective dysregulation/Impulsivity/Lack of insight into violence risk/need for treatment/Noncompliance with treatment

## 2024-08-09 NOTE — ED PROVIDER NOTE - OBJECTIVE STATEMENT
28 y/o M   BIBA secondary to aggression and threatening  behaviours .  Appears paranoid and internally preoccupied.  .   Denies falling, punching or kicking any objects. Denies /HI/AH/VH. .  Denies use of alochol or illicit drugs. . Denies pain, SOB, fever, chills, chest /abdominal discomfort. 28 y/o M   BIBA secondary to aggression and threatening  behaviour .  Appears paranoid and internally preoccupied.  Denies falling, punching or kicking any objects. Denies HI/AH/VH. .  Denies use of alochol or illicit drugs. . Denies pain, SOB, fever, chills, chest /abdominal discomfort.

## 2024-08-09 NOTE — ED PROVIDER NOTE - PROGRESS NOTE DETAILS
KULDIP: Patient was signed out to me pending medical clearance and psychiatric recommendations.  Labs are unremarkable patient has positive THC in urine COVID is negative.  At this time patient will be admitted to [facility] for his decompensation.

## 2024-08-09 NOTE — ED BEHAVIORAL HEALTH ASSESSMENT NOTE - DESCRIPTION
patient calm cooperative in ED    Vital Signs Last 24 Hrs  T(C): 37 (09 Aug 2024 21:34), Max: 37 (09 Aug 2024 21:34)  T(F): 98.6 (09 Aug 2024 21:34), Max: 98.6 (09 Aug 2024 21:34)  HR: 97 (09 Aug 2024 21:34) (97 - 97)  BP: 147/84 (09 Aug 2024 21:34) (147/84 - 147/84)  BP(mean): --  RR: 16 (09 Aug 2024 21:34) (16 - 16)  SpO2: 100% (09 Aug 2024 21:34) (100% - 100%)    Parameters below as of 09 Aug 2024 21:34  Patient On (Oxygen Delivery Method): room air denies Patient vague about social history. 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

## 2024-08-09 NOTE — ED BEHAVIORAL HEALTH ASSESSMENT NOTE - DIFFERENTIAL
schizoaffective disorder vs substance induced mood disorder (pt using cannabis daily) schizoaffective disorder vs substance induced mood disorder (pt using cannabis daily)  patient presents currently with some symptoms suggestive of mood episode including expansive mood, overproductive speech, grandiosity. Prior [facility] diagnosis was schizophrenia however

## 2024-08-10 DIAGNOSIS — F20.9 SCHIZOPHRENIA, UNSPECIFIED: ICD-10-CM

## 2024-08-10 DIAGNOSIS — R46.89 OTHER SYMPTOMS AND SIGNS INVOLVING APPEARANCE AND BEHAVIOR: ICD-10-CM

## 2024-08-10 PROCEDURE — 99222 1ST HOSP IP/OBS MODERATE 55: CPT

## 2024-08-10 RX ORDER — OLANZAPINE 7.5 MG/1
5 TABLET ORAL ONCE
Refills: 0 | Status: DISCONTINUED | OUTPATIENT
Start: 2024-08-10 | End: 2024-08-29

## 2024-08-10 RX ORDER — OLANZAPINE 7.5 MG/1
5 TABLET ORAL EVERY 8 HOURS
Refills: 0 | Status: DISCONTINUED | OUTPATIENT
Start: 2024-08-10 | End: 2024-08-29

## 2024-08-10 RX ORDER — RISPERIDONE 0.25 MG/1
1 TABLET, FILM COATED ORAL AT BEDTIME
Refills: 0 | Status: DISCONTINUED | OUTPATIENT
Start: 2024-08-10 | End: 2024-08-12

## 2024-08-10 NOTE — BH INPATIENT PSYCHIATRY ASSESSMENT NOTE - NSBHCHARTREVIEWVS_PSY_A_CORE FT
Vital Signs Last 24 Hrs  T(C): 36.3 (08-10-24 @ 09:37), Max: 37.2 (08-10-24 @ 05:01)  T(F): 97.3 (08-10-24 @ 09:37), Max: 98.9 (08-10-24 @ 05:01)  HR: 76 (08-10-24 @ 05:01) (76 - 97)  BP: 130/87 (08-10-24 @ 05:01) (130/87 - 147/84)  BP(mean): --  RR: 18 (08-10-24 @ 09:37) (16 - 18)  SpO2: 99% (08-10-24 @ 05:01) (99% - 100%)    Orthostatic VS  08-10-24 @ 09:37  Lying BP: --/-- HR: --  Sitting BP: 130/84 HR: 81  Standing BP: 127/88 HR: 86  Site: --  Mode: --

## 2024-08-10 NOTE — ED ADULT NURSE REASSESSMENT NOTE - NS ED NURSE REASSESS COMMENT FT1
0830 Pt calm transferred via EMS TO .
Break covering RN: patient received, appears to be resting comfortably in bed, no complaints noted at this time. Breathing even and unlabored, pallor/diaphoresis not noted. will continue to monitor.
126

## 2024-08-10 NOTE — BH INPATIENT PSYCHIATRY ASSESSMENT NOTE - NSBHASSESSSUMMFT_PSY_ALL_CORE
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 with hx of schizophrenia vs schizoaffective disorder, substance use (daily alcohol and cannabis use), non adherent to meds and psych aftercare, with prior hx of psych admissions including (last [facility] L3 11/1/20222-12/20/2022), was on AOT (discontinued Jan 2024). Tonight, presented to the ED BIB EMS activated by parents due to erratic behavior; agitated and displaying bizarre behavior at home. On eval, patient is oddly-related, vague, and evasive although superficially cooperative, with tangential thought process and over productive speech. Reports medication nonadherent, use of 1-2 beers daily and cannabis daily. Patient denies all psych symptoms and history, showing poor insight into his illness. Per family patient has been more agitated lately, with worsening sleep and appetite. Today, pt jumped on furniture screaming, also recently kicked a door down. Patient has also been reporting command auditory hallucinations to family (pt denies this). Family states patient is not at baseline and is advocating for admission. Updated patient's mother on disposition and answered questions.    Plan:  1.	Legals: admit on 9.27  2.	Safety: routine observation, denies SI/HI/I/P on the unit. PRNs: Zyprexa 5mg PO/IM. Avoiding Haldol due to listed intolerance (EPS).  Of note, patient states he has a gun license, would verify that patient is SAFE acted prior to discharge  3.	Psychiatry: start risperidone 1 mg  4.	Group, milieu, individual therapy as appropriate.  5.	Medical: no acute medical issues, no significant PMH.  Admission labs WNL.  6.	Dispo: pending clinical improvement.  Patient continues to require inpatient hospitalization for stabilization and safety.

## 2024-08-10 NOTE — BH PATIENT PROFILE - FALL HARM RISK - UNIVERSAL INTERVENTIONS
Bed in lowest position, wheels locked, appropriate side rails in place/Call bell, personal items and telephone in reach/Instruct patient to call for assistance before getting out of bed or chair/Non-slip footwear when patient is out of bed/[place] to call system/Physically safe environment - no spills, clutter or unnecessary equipment/Purposeful Proactive Rounding/Room/bathroom lighting operational, light cord in reach

## 2024-08-10 NOTE — BH INPATIENT PSYCHIATRY ASSESSMENT NOTE - DESCRIPTION
Patient vague about social history. 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

## 2024-08-10 NOTE — BH INPATIENT PSYCHIATRY ASSESSMENT NOTE - ATTENDING COMMENTS
Patient seen by me individually for initial inpatient interview.  I was physically present during the service to the patient and personally examined the patient and I was directly involved in the management plan and recommendations of the care provided to the patient. I have reviewed the admission record and reviewed the patient’s physical examination, review of systems and admission labs. I have discussed the case with the NP, and I have reviewed the NP’s admission assessment. I agree with the above admission progress notes’ contents and the treatment plan and recommendations described above as per the Np’s note, and I have made changes as indicated.    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 with hx of schizophrenia vs schizoaffective disorder, substance use (daily alcohol and cannabis use), non adherent to meds and psych aftercare, with prior hx of psych admissions including (last [facility] L3 11/1/20222-12/20/2022), was on AOT (discontinued Jan 2024). Tonight, presented to the ED BIB EMS activated by parents due to erratic behavior; agitated and displaying bizarre behavior at home. On eval, patient is oddly-related, vague, and evasive although superficially cooperative, with tangential thought process and over productive speech. Reports medication nonadherent, use of 1-2 beers daily and cannabis daily. Patient denies all psych symptoms and history, showing poor insight into his illness. Per family patient has been more agitated lately, with worsening sleep and appetite. Today, pt jumped on furniture screaming, also recently kicked a door down. Patient has also been reporting command auditory hallucinations to family (pt denies this). Family states patient is not at baseline and is advocating for admission. Updated patient's mother on disposition and answered questions.    Patient irritable and agitated on interview  States he will not eat, drink or take medications until he is discharged   With very impaired insight and judgement     Plan:  1.	Legals: admit on 9.27 status  2.	Safety: routine observation, denies SI/HI/I/P on the unit. PRNs: Zyprexa 5mg PO/IM. Avoiding Haldol due to listed intolerance (EPS).  Of note, patient states he has a gun license, would verify that patient is SAFE acted prior to discharge  3.	Psychiatry: start risperidone 1 mg

## 2024-08-10 NOTE — BH PATIENT PROFILE - NSFALLSECTIONLABEL_GEN_A_CORE
Alexys Patel (: 1946) is a 78 y.o. female, patient, here for evaluation of the following chief complaint(s):  Hip Pain (Left, groin)       SUBJECTIVE/OBJECTIVE:  Alexys Patel presents today. To review, she started with acute groin pain around , then I saw her again in December. She had an intra-articular injection after that visit, which provided moderate but very temporary relief of local hip symptoms. Symptoms have continued to wax and wane. Actually, today she states she is pain-free. However, in general she can have significant symptoms with all activities. She has periods of time where it does not bother her much, then may limit her significantly. When flared up she has trouble with simple ADLs. Intermittent wakening night pain in the groin. Relates buttock pain intermittently with activities, not as severe as the groin pain. She takes chronic tramadol twice daily, Tylenol. She has been on prednisone for a Sjogren's flare involving her kidneys. Initially physical therapy helped significantly. PHYSICAL EXAM:  Vitals: Ht 5' 6.2\" (1.681 m)   Wt 180 lb (81.6 kg)   BMI 28.88 kg/m²   Body mass index is 28.88 kg/m². 78y.o. year old F, no distress. Ambulates without limp or Trendelenburg gait. Negative Stinchfield left hip. Minimal groin discomfort at extremes of rotation which is well-preserved. No trochanteric tenderness. Symmetrical palpable distal pulses. No gross motor or sensory deficits in lower extremities. No distal edema. IMAGING:  Radiographs: X-rays of left hip from January demonstrate mild loss of proximal hip joint space. XR Results (most recent):  Results from Appointment encounter on 22    XR HIP LT W OR WO PELV 2-3 VWS    Narrative  3 x-ray views of left hip including AP pelvis, AP and frog lateral images demonstrate mild osteoarthritis, with mild loss of proximal joint space. Labral calcification.   No significant progression compared to x-rays from 8 months ago. Severe lumbar degenerative disc disease evident. ASSESSMENT/PLAN:  1. Tear of left acetabular labrum, subsequent encounter  -     XR HIP LT W OR WO PELV 2-3 VWS; Future  -     MRI HIP LT WO CONT; Future  Mild osteoarthritis of left hip. She is frustrated. I will send her for an MRI of the left hip to evaluate acetabular labrum. She can have a significant labral tear associated with the arthritis. She will return to see me in the office after the study is complete. May need to consider looking at her lumbar spine in the future as well. No follow-ups on file. Review Of Systems  ROS    Positive for: Musculoskeletal  Last edited by Juanjo Sanchez on 7/25/2022 10:00 AM.         Patient denies any recent fever, chills, nausea, vomiting, chest pain, or shortness of breath. Allergies   Allergen Reactions    Prednisone Other (comments)     Effects pts vision permanentely    Augmentin [Amoxicillin-Pot Clavulanate] Rash     Rash of lips    Entex [Phenylephrine-Guaifenesin] Rash     Heart racing    Levofloxacin Myalgia     Knees hurt    Lotemax [Loteprednol Etabonate] Other (comments)     headaches    Omnicef [Cefdinir] Rash     Mouth blisters     Other Plant, Animal, Environmental Other (comments)     Styrofoam cups, blisters to mouth    Penicillin G Rash     Rash on lips    Scallops Nausea and Vomiting     And sensitive skin    Shellfish Derived Nausea and Vomiting     Scallops only       Current Outpatient Medications   Medication Sig    predniSONE (DELTASONE) 5 mg tablet Take 5 mg by mouth in the morning. cholecalciferol (VITAMIN D3) (1000 Units /25 mcg) tablet Take 2,000 Units by mouth in the morning. ascorbic acid, vitamin C, (VITAMIN C) 500 mg tablet Take 1,000 mg by mouth in the morning. acetaminophen (TYLENOL) 500 mg tablet Take 1,000 mg by mouth two (2) times a day.     azaTHIOprine (IMURAN) 50 mg tablet Take 125 mg by mouth in the morning. traMADoL (ULTRAM) 50 mg tablet Take 50 mg by mouth two (2) times daily as needed. venlafaxine (EFFEXOR) 37.5 mg tablet Take 37.5 mg by mouth two (2) times a day. sodium bicarbonate 650 mg tablet Take 650 mg by mouth three (3) times daily. potassium chloride (K-DUR, KLOR-CON M20) 20 mEq tablet Take 20 mEq by mouth. fexofenadine (ALLEGRA) 180 mg tablet Take 180 mg by mouth daily. fluticasone propionate (FLONASE) 50 mcg/actuation nasal spray 2 Sprays by Both Nostrils route nightly. carboxymethylcellulose sodium (REFRESH LIQUIGEL) 1 % dlgl ophthalmic solution Apply 1 drop to eye four (4) times daily as needed. cycloSPORINE (RESTASIS) 0.05 % dpet Administer 1 drop to both eyes two (2) times a day. No current facility-administered medications for this visit.        Past Medical History:   Diagnosis Date    Arthritis     osteo    Cancer ([facility] [address]     squamous cell on nose lt side    Chronic kidney disease     seeing dr alvarez for overactive bladder/ kidneys dr Vane Plascencia    Chronic tubulointerstitial nephritis     Erythema nodosum     RIGHT LEG AND FOOT    History of shingles     Ill-defined condition     possible tubulointerstitual disease    Ill-defined condition     raynaud's phenomenon    Ill-defined condition     erythema nodosum rt leg and foot    Ill-defined condition     plantar fiascitis    Ill-defined disease     lickens planus lt side of tongue    Psychiatric disorder     depression    Raynaud's phenomenon (by history or observed)     Sjogren's disease ([facility] [address]     Tinnitus         Past Surgical History:   Procedure Laterality Date    HX APPENDECTOMY      HX CATARACT REMOVAL Bilateral 2016    HX CHOLECYSTECTOMY  2015    HX COLONOSCOPY  2008    HX GYN      3 vaginal delivery    HX HYSTERECTOMY      HX KNEE REPLACEMENT  01/2017    LEFT TOTAL KNEE REPLACEMENT     HX ORTHOPAEDIC  2003    right knee 2003- TORN CARTILAGE REPAIR, ARTHROSCOPIC    HX OTHER SURGICAL      STRESS TEST IN 2010 - NORMAL PER PATIENT    HX UROLOGICAL      RIGHT ADRENAL MASS     MS ABDOMEN SURGERY PROC UNLISTED  1/27/15    DAVINCI RIGHT ADRENALECTOMY and CHOLECYSTECOMTY       Family History   Problem Relation Age of Onset    Cancer Mother         LEUKEMIA    Cancer Father         LYMPHOMA    Cancer Brother         COLON CANCER    Anesth Problems Neg Hx         Social History     Socioeconomic History    Marital status:      Spouse name: Not on file    Number of children: Not on file    Years of education: Not on file    Highest education level: Not on file   Occupational History    Not on file   Tobacco Use    Smoking status: Never    Smokeless tobacco: Former   Substance and Sexual Activity    Alcohol use: Yes     Alcohol/week: 14.0 standard drinks     Types: 14 Shots of liquor per week     Comment: socially    Drug use: No    Sexual activity: Not on file   Other Topics Concern    Not on file   Social History Narrative    Not on file     Social Determinants of Health     Financial Resource Strain: Not on file   Food Insecurity: Not on file   Transportation Needs: Not on file   Physical Activity: Not on file   Stress: Not on file   Social Connections: Not on file   Intimate Partner Violence: Not on file   Housing Stability: Not on file       Orders Placed This Encounter    XR HIP LT W OR WO PELV 2-3 VWS     Standing Status:   Future     Number of Occurrences:   1     Standing Expiration Date:   7/26/2023    MRI HIP LT WO CONT     Standing Status:   Future     Standing Expiration Date:   7/25/2023     Order Specific Question:   Arthrogram study     Answer:   No        An electronic signature was used to authenticate this note.   -- Aaron Campbell MD .

## 2024-08-10 NOTE — BH INPATIENT PSYCHIATRY ASSESSMENT NOTE - CURRENT MEDICATION
MEDICATIONS  (STANDING):  risperiDONE   Tablet 1 milliGRAM(s) Oral at bedtime    MEDICATIONS  (PRN):  OLANZapine 5 milliGRAM(s) Oral every 8 hours PRN agitation  OLANZapine Injectable 5 milliGRAM(s) IntraMuscular Once PRN agitation

## 2024-08-10 NOTE — BH PATIENT PROFILE - FUNCTIONAL ASSESSMENT - DAILY ACTIVITY ASSESSMENT TYPE
Admitted for septic workup and evaluation,send blood and urine cx,serial lactate levels,monitor vitals closley,ivfs hydration,monitor urine output and renal profile,iv abx initiated -on ceftriaxone Admission

## 2024-08-10 NOTE — BH INPATIENT PSYCHIATRY ASSESSMENT NOTE - NSBHMETABOLIC_PSY_ALL_CORE_FT
BMI: BMI (kg/m2): 22.9 (08-10-24 @ 09:37)  HbA1c:   Glucose:   BP: 130/87 (08-10-24 @ 05:01) (130/87 - 147/84)Vital Signs Last 24 Hrs  T(C): 36.3 (08-10-24 @ 09:37), Max: 37.2 (08-10-24 @ 05:01)  T(F): 97.3 (08-10-24 @ 09:37), Max: 98.9 (08-10-24 @ 05:01)  HR: 76 (08-10-24 @ 05:01) (76 - 97)  BP: 130/87 (08-10-24 @ 05:01) (130/87 - 147/84)  BP(mean): --  RR: 18 (08-10-24 @ 09:37) (16 - 18)  SpO2: 99% (08-10-24 @ 05:01) (99% - 100%)    Orthostatic VS  08-10-24 @ 09:37  Lying BP: --/-- HR: --  Sitting BP: 130/84 HR: 81  Standing BP: 127/88 HR: 86  Site: --  Mode: --    Lipid Panel:

## 2024-08-10 NOTE — BH PATIENT PROFILE - FUNCTIONAL ASSESSMENT - BASIC MOBILITY 3.
[Other: _____] : [unfilled] [Initial Eval - Existing Diagnosis] : an initial evaluation of an existing diagnosis 4 = No assist / stand by assistance

## 2024-08-10 NOTE — BH INPATIENT PSYCHIATRY ASSESSMENT NOTE - HPI (INCLUDE ILLNESS QUALITY, SEVERITY, DURATION, TIMING, CONTEXT, MODIFYING FACTORS, ASSOCIATED SIGNS AND SYMPTOMS)
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 with hx of schizophrenia vs schizoaffective disorder, substance use (daily alcohol and cannabis use), non adherent to meds and psych aftercare, with prior hx of psych admissions including (last [facility] L3 11/1/20222-12/20/2022), was on AOT (discontinued Jan 2024). Tonight, presented to the ED BIB EMS activated by parents due to erratic behavior; agitated and displaying bizarre behavior at home.     Patient evaluated at bedside. He is oddly-related, vague, and evasive although superficially cooperative. He states that today he drank a beer while out with coworkers ("As is my god-given right") and states there were "eyes on him" and colleagues reported him to his parents. States his mother calls EMS on him often. Patient has difficulty staying on topic, begins to discuss working with metal stating "I can melt iron to make anything I want to, anything my heart desires!" He denies all psychiatric symptoms and denies all psychiatric history, stating he was hospitalized in past for "being a child" rather than psychosis. Patient does not respond to questions regarding sleep, appetite, and ADLs ("I sleep when I'm tired"). Patient states he used to take Invega and stopped as soon as he was no longer forced to take it. Endorses 1-2 beers daily, cannabis daily. Denies nicotine, cocaine, amphetamine, prescription pill use. Evasive about psychedelic use. Patient denies psychotic symptoms, including auditory hallucinations, paranoia. Denies any current symptoms of sujey, including decreased need for sleep, persistent elevated and/or irritable mood, decreased goal-directed behavior, and increase in reckless behaviors. Denies depressive symptoms, including low mood, anhedonia, hopelessness, and S/I. No current or history of self-injurious urges or behaviors. No VI or HI. No substance use. No access to guns or other weapons. Patient states his father has guns but denies any access to them.    Please see  note for full collateral. Briefly, per family patient has been more agitated lately, with worsening sleep and appetite. Today, pt jumped on furniture screaming, also recently kicked a door down. Patient has also been reporting command auditory hallucinations to family (pt denies this). Family states patient is not at baseline and is advocating for admission. 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 with hx of schizophrenia vs schizoaffective disorder, substance use (daily alcohol and cannabis use), non adherent to meds and psych aftercare, with prior hx of psych admissions including (last [facility] L3 11/1/20222-12/20/2022), was on AOT (discontinued Jan 2024). Tonight, presented to the ED BIB EMS activated by parents due to erratic behavior; agitated and displaying bizarre behavior at home.     Patient evaluated at bedside. He is oddly-related, vague, and evasive although superficially cooperative. He states that today he drank a beer while out with coworkers ("As is my god-given right") and states there were "eyes on him" and colleagues reported him to his parents. States his mother calls EMS on him often. Patient has difficulty staying on topic, begins to discuss working with metal stating "I can melt iron to make anything I want to, anything my heart desires!" He denies all psychiatric symptoms and denies all psychiatric history, stating he was hospitalized in past for "being a child" rather than psychosis. Patient does not respond to questions regarding sleep, appetite, and ADLs ("I sleep when I'm tired"). Patient states he used to take Invega and stopped as soon as he was no longer forced to take it. Endorses 1-2 beers daily, cannabis daily. Denies nicotine, cocaine, amphetamine, prescription pill use. Evasive about psychedelic use. Patient denies psychotic symptoms, including auditory hallucinations, paranoia. Denies any current symptoms of sujey, including decreased need for sleep, persistent elevated and/or irritable mood, decreased goal-directed behavior, and increase in reckless behaviors. Denies depressive symptoms, including low mood, anhedonia, hopelessness, and S/I. No current or history of self-injurious urges or behaviors. No VI or HI. No substance use. No access to guns or other weapons. Patient states his father has guns but denies any access to them.    Please see  note for full collateral. Briefly, per family patient has been more agitated lately, with worsening sleep and appetite. Today, pt jumped on furniture screaming, also recently kicked a door down. Patient has also been reporting command auditory hallucinations to family (pt denies this). Family states patient is not at baseline and is advocating for admission."    Patient refused to participate in interview. He is out on the unit, in tenuous control.

## 2024-08-10 NOTE — BH INPATIENT PSYCHIATRY ASSESSMENT NOTE - RISK ASSESSMENT
Risk factors include: single, male, insomnia, agitation, impulsivity, active psychosis, active mood episode, active substance use, low frustration tolerance, noncompliant with treatment/not receiving treatment, , limited insight into symptoms, absence of outpatient follow-up, unable to care for self secondary to psychiatric illness.  Protective factors include: Young, healthy, denies SI/I/P, no history of suicide attempts, no history of NSSIB, identifies reasons for living,  future oriented, engaged in work, stable housing, strong social supports,
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
## 2024-08-10 NOTE — BH PATIENT PROFILE - HOME MEDICATIONS
paliperidone 156 mg/mL intramuscular suspension, extended release , 156 milligram(s) intramuscular every 4 weeks; (loaded 234 on 11/30/2022, 156 on 12/8/2022 and first maintenance dose due around 1/5/2022)

## 2024-08-10 NOTE — BH INPATIENT PSYCHIATRY ASSESSMENT NOTE - NSBHATTESTATTENDBILLTIME_PSY_A_CORE
I attest my time as attending is greater than PATSY time spent on qualifying patient care activities. I independently performed the documented

## 2024-08-11 PROCEDURE — 99232 SBSQ HOSP IP/OBS MODERATE 35: CPT

## 2024-08-11 RX ADMIN — RISPERIDONE 1 MILLIGRAM(S): 0.25 TABLET, FILM COATED ORAL at 20:10

## 2024-08-11 NOTE — BH INPATIENT PSYCHIATRY PROGRESS NOTE - NSBHFUPINTERVALHXFT_PSY_A_CORE
Chart reviewed and case discussed with treatment team. No events reported overnight. Sleep and appetite is poor. Patient has been refusing all medications and stated that he was going to refuse to eat so that he can go home. With support and encouragement patient eventually ate lunch but does not want medication. Patient stated that he does not like how it makes him feel. Continues to report that he is only here because he drank one beer in front of his mother.

## 2024-08-11 NOTE — BH INPATIENT PSYCHIATRY PROGRESS NOTE - NSBHCHARTREVIEWVS_PSY_A_CORE FT
Vital Signs Last 24 Hrs  T(C): 36.4 (08-11-24 @ 06:46), Max: 36.4 (08-11-24 @ 06:46)  T(F): 97.6 (08-11-24 @ 06:46), Max: 97.6 (08-11-24 @ 06:46)  HR: --  BP: --  BP(mean): --  RR: 16 (08-11-24 @ 06:46) (16 - 16)  SpO2: --    Orthostatic VS  08-11-24 @ 06:46  Lying BP: 103/58 HR: 77  Sitting BP: --/-- HR: --  Standing BP: --/-- HR: --  Site: --  Mode: --  Orthostatic VS  08-10-24 @ 09:37  Lying BP: --/-- HR: --  Sitting BP: 130/84 HR: 81  Standing BP: 127/88 HR: 86  Site: --  Mode: --

## 2024-08-11 NOTE — BH INPATIENT PSYCHIATRY PROGRESS NOTE - NSBHMETABOLIC_PSY_ALL_CORE_FT
BMI: BMI (kg/m2): 22.9 (08-10-24 @ 09:37)  HbA1c:   Glucose:   BP: 130/87 (08-10-24 @ 05:01) (130/87 - 147/84)Vital Signs Last 24 Hrs  T(C): 36.4 (08-11-24 @ 06:46), Max: 36.4 (08-11-24 @ 06:46)  T(F): 97.6 (08-11-24 @ 06:46), Max: 97.6 (08-11-24 @ 06:46)  HR: --  BP: --  BP(mean): --  RR: 16 (08-11-24 @ 06:46) (16 - 16)  SpO2: --    Orthostatic VS  08-11-24 @ 06:46  Lying BP: 103/58 HR: 77  Sitting BP: --/-- HR: --  Standing BP: --/-- HR: --  Site: --  Mode: --  Orthostatic VS  08-10-24 @ 09:37  Lying BP: --/-- HR: --  Sitting BP: 130/84 HR: 81  Standing BP: 127/88 HR: 86  Site: --  Mode: --    Lipid Panel:

## 2024-08-11 NOTE — BH INPATIENT PSYCHIATRY PROGRESS NOTE - NSBHASSESSSUMMFT_PSY_ALL_CORE
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 with hx of schizophrenia vs schizoaffective disorder, substance use (daily alcohol and cannabis use), non adherent to meds and psych aftercare, with prior hx of psych admissions including (last [facility] L3 11/1/20222-12/20/2022), was on AOT (discontinued Jan 2024). Tonight, presented to the ED BIB EMS activated by parents due to erratic behavior; agitated and displaying bizarre behavior at home. On eval, patient is oddly-related, vague, and evasive although superficially cooperative, with tangential thought process and over productive speech. Reports medication nonadherent, use of 1-2 beers daily and cannabis daily. Patient denies all psych symptoms and history, showing poor insight into his illness. Per family patient has been more agitated lately, with worsening sleep and appetite. Today, pt jumped on furniture screaming, also recently kicked a door down. Patient has also been reporting command auditory hallucinations to family (pt denies this). Family states patient is not at baseline and is advocating for admission. Updated patient's mother on disposition and answered questions.    Plan:  1.	Legals: admit on 9.27  2.	Safety: routine observation, denies SI/HI/I/P on the unit. PRNs: Zyprexa 5mg PO/IM. Avoiding Haldol due to listed intolerance (EPS).  Of note, patient states he has a gun license, would verify that patient is SAFE acted prior to discharge  3.	Psychiatry: start risperidone 1 mg  4.	Group, milieu, individual therapy as appropriate.  5.	Medical: no acute medical issues, no significant PMH.  Admission labs WNL.  6.	Dispo: pending clinical improvement.  Patient continues to require inpatient hospitalization for stabilization and safety.

## 2024-08-12 PROCEDURE — 99232 SBSQ HOSP IP/OBS MODERATE 35: CPT

## 2024-08-12 RX ORDER — PALIPERIDONE 3 MG/1
3 TABLET, EXTENDED RELEASE ORAL AT BEDTIME
Refills: 0 | Status: DISCONTINUED | OUTPATIENT
Start: 2024-08-12 | End: 2024-08-15

## 2024-08-12 RX ADMIN — PALIPERIDONE 3 MILLIGRAM(S): 3 TABLET, EXTENDED RELEASE ORAL at 20:28
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
## 2024-08-12 NOTE — BH INPATIENT PSYCHIATRY PROGRESS NOTE - NSBHCHARTREVIEWVS_PSY_A_CORE FT
Vital Signs Last 24 Hrs  T(C): 36.6 (08-12-24 @ 08:13), Max: 36.8 (08-11-24 @ 19:23)  T(F): 97.9 (08-12-24 @ 08:13), Max: 98.2 (08-11-24 @ 19:23)  HR: --  BP: --  BP(mean): --  RR: 18 (08-12-24 @ 07:29) (17 - 18)  SpO2: --    Orthostatic VS  08-12-24 @ 08:13  Lying BP: --/-- HR: --  Sitting BP: 129/68 HR: 84  Standing BP: 120/72 HR: 110  Site: --  Mode: --  Orthostatic VS  08-11-24 @ 19:23  Lying BP: --/-- HR: --  Sitting BP: 123/76 HR: 75  Standing BP: 120/79 HR: 78  Site: --  Mode: --  Orthostatic VS  08-11-24 @ 06:46  Lying BP: 103/58 HR: 77  Sitting BP: --/-- HR: --  Standing BP: --/-- HR: --  Site: --  Mode: --
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
## 2024-08-12 NOTE — BH SOCIAL WORK INITIAL PSYCHOSOCIAL EVALUATION - NSBHTREATHX_PSY_ALL_CORE
Pharmacist chart review completed for refill of Capecitabine indicates no medication or significant health changes since last pharmacist counseling. No questions for the pharmacist. Communicated with patient over phone. Patient's prescription was billed through Medicare Part D. Medication shipped on 12/5 via [employer] to [address] [place] [id number] for delivery in 1-2 business days.     Shelley Lott   [facility]  Phone: [phone]  [email or web address]           
No

## 2024-08-12 NOTE — BH INPATIENT PSYCHIATRY PROGRESS NOTE - CURRENT MEDICATION
MEDICATIONS  (STANDING):  risperiDONE   Tablet 1 milliGRAM(s) Oral at bedtime    MEDICATIONS  (PRN):  OLANZapine 5 milliGRAM(s) Oral every 8 hours PRN agitation  OLANZapine Injectable 5 milliGRAM(s) IntraMuscular Once PRN agitation   MEDICATIONS  (STANDING):  paliperidone ER 3 milliGRAM(s) Oral at bedtime    MEDICATIONS  (PRN):  OLANZapine 5 milliGRAM(s) Oral every 8 hours PRN agitation  OLANZapine Injectable 5 milliGRAM(s) IntraMuscular Once PRN agitation

## 2024-08-12 NOTE — BH INPATIENT PSYCHIATRY PROGRESS NOTE - NSBHASSESSSUMMFT_PSY_ALL_CORE
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 with hx of schizophrenia vs schizoaffective disorder, substance use (daily alcohol and cannabis use), non adherent to meds and psych aftercare, with prior hx of psych admissions including (last [facility] L3 11/1/20222-12/20/2022), was on AOT (discontinued Jan 2024). Tonight, presented to the ED BIB EMS activated by parents due to erratic behavior; agitated and displaying bizarre behavior at home. On eval, patient is oddly-related, vague, and evasive although superficially cooperative, with tangential thought process and over productive speech. Reports medication nonadherent, use of 1-2 beers daily and cannabis daily. Patient denies all psych symptoms and history, showing poor insight into his illness. Per family patient has been more agitated lately, with worsening sleep and appetite. Today, pt jumped on furniture screaming, also recently kicked a door down. Patient has also been reporting command auditory hallucinations to family (pt denies this). Family states patient is not at baseline and is advocating for admission. Updated patient's mother on disposition and answered questions.    Treatment Plan  1. admitted to unit, legal status 9.27  2. safety  - routine checks as pt denies SIIP/HIIP and is able to contract for safety  - haldol and ativan PO/IM PRN for agitation and anxiety  - patient states he has a gun license, would verify that patient is SAFE acted prior to discharge  3. psychiatric  - stop risperidal  - start invega 3mg for psychosis  - past trials: invega sustenna HEALY, haldol (EPS)  4. medical  -  5. encourage I/G/M therapy  6. 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 with hx of schizophrenia vs schizoaffective disorder, substance use (daily alcohol and cannabis use), non adherent to meds and psych aftercare, with prior hx of psych admissions including (last [facility] L3 11/1/20222-12/20/2022), was on AOT (discontinued Jan 2024). Tonight, presented to the ED BIB EMS activated by parents due to erratic behavior; agitated and displaying bizarre behavior at home. On eval, patient is oddly-related, vague, and evasive although superficially cooperative, with tangential thought process and over productive speech. Reports medication nonadherent, use of 1-2 beers daily and cannabis daily. Patient denies all psych symptoms and history, showing poor insight into his illness. Per family patient has been more agitated lately, with worsening sleep and appetite. Today, pt jumped on furniture screaming, also recently kicked a door down. Patient has also been reporting command auditory hallucinations to family (pt denies this). Family states patient is not at baseline and is advocating for admission. Updated patient's mother on disposition and answered questions.    Treatment Plan  1. admitted to unit, legal status 9.27  2. safety  - routine checks as pt denies SIIP/HIIP and is able to contract for safety  - haldol and ativan PO/IM PRN for agitation and anxiety  - patient states he has a gun license, would verify that patient is SAFE acted prior to discharge  3. psychiatric  - stop risperidal  - start invega 3mg for psychosis  - past trials: invega sustenna HEALY 39mg (Jan 2024), haldol (EPS)  4. medical  -  5. encourage I/G/M therapy  6. dispo  - patient states he has a gun license, would verify that patient is SAFE acted prior to discharge

## 2024-08-12 NOTE — BH INPATIENT PSYCHIATRY PROGRESS NOTE - NSBHMETABOLIC_PSY_ALL_CORE_FT
BMI: BMI (kg/m2): 22.9 (08-10-24 @ 09:37)  HbA1c:   Glucose:   BP: 130/87 (08-10-24 @ 05:01) (130/87 - 147/84)Vital Signs Last 24 Hrs  T(C): 36.6 (08-12-24 @ 08:13), Max: 36.8 (08-11-24 @ 19:23)  T(F): 97.9 (08-12-24 @ 08:13), Max: 98.2 (08-11-24 @ 19:23)  HR: --  BP: --  BP(mean): --  RR: 18 (08-12-24 @ 07:29) (17 - 18)  SpO2: --    Orthostatic VS  08-12-24 @ 08:13  Lying BP: --/-- HR: --  Sitting BP: 129/68 HR: 84  Standing BP: 120/72 HR: 110  Site: --  Mode: --  Orthostatic VS  08-11-24 @ 19:23  Lying BP: --/-- HR: --  Sitting BP: 123/76 HR: 75  Standing BP: 120/79 HR: 78  Site: --  Mode: --  Orthostatic VS  08-11-24 @ 06:46  Lying BP: 103/58 HR: 77  Sitting BP: --/-- HR: --  Standing BP: --/-- HR: --  Site: --  Mode: --    Lipid Panel:

## 2024-08-13 PROCEDURE — 99232 SBSQ HOSP IP/OBS MODERATE 35: CPT

## 2024-08-13 RX ADMIN — PALIPERIDONE 3 MILLIGRAM(S): 3 TABLET, EXTENDED RELEASE ORAL at 20:41

## 2024-08-13 NOTE — BH INPATIENT PSYCHIATRY PROGRESS NOTE - CURRENT MEDICATION
MEDICATIONS  (STANDING):  paliperidone ER 3 milliGRAM(s) Oral at bedtime    MEDICATIONS  (PRN):  OLANZapine 5 milliGRAM(s) Oral every 8 hours PRN agitation  OLANZapine Injectable 5 milliGRAM(s) IntraMuscular Once PRN agitation

## 2024-08-13 NOTE — BH INPATIENT PSYCHIATRY PROGRESS NOTE - NSBHFUPINTERVALHXFT_PSY_A_CORE
Chart and history reviewed and discussed with treatment team. No events reported overnight. Pt reports no complaints regarding switch in medication, stating that he slept well. Pt continues to fixate on discharge, perseverative, oddly related at times. Letter left in pt's chart regarding hospital stay per pt request. No behavioral issues noted. Medication compliant. Sleep and appetite maintained. Denies SI/HI/AVH. Continue to monitor for safety.

## 2024-08-13 NOTE — BH INPATIENT PSYCHIATRY PROGRESS NOTE - NSBHMETABOLIC_PSY_ALL_CORE_FT
BMI: BMI (kg/m2): 22.9 (08-10-24 @ 09:37)  HbA1c:   Glucose:   BP: --Vital Signs Last 24 Hrs  T(C): 36.8 (08-13-24 @ 08:42), Max: 37.1 (08-12-24 @ 19:16)  T(F): 98.2 (08-13-24 @ 08:42), Max: 98.8 (08-12-24 @ 19:16)  HR: --  BP: --  BP(mean): --  RR: 17 (08-12-24 @ 23:51) (17 - 17)  SpO2: --    Orthostatic VS  08-13-24 @ 08:42  Lying BP: 127/72 HR: 62  Sitting BP: 118/68 HR: 86  Standing BP: --/-- HR: --  Site: --  Mode: electronic  Orthostatic VS  08-12-24 @ 19:16  Lying BP: --/-- HR: --  Sitting BP: 112/63 HR: 89  Standing BP: 104/69 HR: 94  Site: --  Mode: --  Orthostatic VS  08-12-24 @ 08:13  Lying BP: --/-- HR: --  Sitting BP: 129/68 HR: 84  Standing BP: 120/72 HR: 110  Site: --  Mode: --  Orthostatic VS  08-11-24 @ 19:23  Lying BP: --/-- HR: --  Sitting BP: 123/76 HR: 75  Standing BP: 120/79 HR: 78  Site: --  Mode: --    Lipid Panel:

## 2024-08-13 NOTE — BH INPATIENT PSYCHIATRY PROGRESS NOTE - NSBHCHARTREVIEWVS_PSY_A_CORE FT
Vital Signs Last 24 Hrs  T(C): 36.8 (08-13-24 @ 08:42), Max: 37.1 (08-12-24 @ 19:16)  T(F): 98.2 (08-13-24 @ 08:42), Max: 98.8 (08-12-24 @ 19:16)  HR: --  BP: --  BP(mean): --  RR: 17 (08-12-24 @ 23:51) (17 - 17)  SpO2: --    Orthostatic VS  08-13-24 @ 08:42  Lying BP: 127/72 HR: 62  Sitting BP: 118/68 HR: 86  Standing BP: --/-- HR: --  Site: --  Mode: electronic  Orthostatic VS  08-12-24 @ 19:16  Lying BP: --/-- HR: --  Sitting BP: 112/63 HR: 89  Standing BP: 104/69 HR: 94  Site: --  Mode: --  Orthostatic VS  08-12-24 @ 08:13  Lying BP: --/-- HR: --  Sitting BP: 129/68 HR: 84  Standing BP: 120/72 HR: 110  Site: --  Mode: --  Orthostatic VS  08-11-24 @ 19:23  Lying BP: --/-- HR: --  Sitting BP: 123/76 HR: 75  Standing BP: 120/79 HR: 78  Site: --  Mode: --

## 2024-08-14 PROCEDURE — 99232 SBSQ HOSP IP/OBS MODERATE 35: CPT

## 2024-08-14 RX ADMIN — PALIPERIDONE 3 MILLIGRAM(S): 3 TABLET, EXTENDED RELEASE ORAL at 20:29

## 2024-08-14 NOTE — BH INPATIENT PSYCHIATRY PROGRESS NOTE - NSBHASSESSSUMMFT_PSY_ALL_CORE
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 with hx of schizophrenia vs schizoaffective disorder, substance use (daily alcohol and cannabis use), non adherent to meds and psych aftercare, with prior hx of psych admissions including (last [facility] L3 11/1/20222-12/20/2022), was on AOT (discontinued Jan 2024). Tonight, presented to the ED BIB EMS activated by parents due to erratic behavior; agitated and displaying bizarre behavior at home. On eval, patient is oddly-related, vague, and evasive although superficially cooperative, with tangential thought process and over productive speech. Reports medication nonadherent, use of 1-2 beers daily and cannabis daily. Patient denies all psych symptoms and history, showing poor insight into his illness. Per family patient has been more agitated lately, with worsening sleep and appetite. Today, pt jumped on furniture screaming, also recently kicked a door down. Patient has also been reporting command auditory hallucinations to family (pt denies this). Family states patient is not at baseline and is advocating for admission. Updated patient's mother on disposition and answered questions.    Treatment Plan  1. admitted to unit, legal status 9.27  2. safety  - routine checks as pt denies SIIP/HIIP and is able to contract for safety  - haldol and ativan PO/IM PRN for agitation and anxiety  - patient states he has a gun license, would verify that patient is SAFE acted prior to discharge  3. psychiatric  - stop risperidal  - c/w invega 3mg for psychosis  - past trials: invega sustenna HEALY 39mg (Jan 2024), haldol (EPS)  4. medical  -  5. encourage I/G/M therapy  6. dispo  - patient states he has a gun license, would verify that patient is SAFE acted prior to discharge

## 2024-08-14 NOTE — BH INPATIENT PSYCHIATRY PROGRESS NOTE - NSBHCHARTREVIEWVS_PSY_A_CORE FT
Vital Signs Last 24 Hrs  T(C): 36.3 (08-14-24 @ 08:46), Max: 36.3 (08-14-24 @ 08:46)  T(F): 97.4 (08-14-24 @ 08:46), Max: 97.4 (08-14-24 @ 08:46)  HR: --  BP: --  BP(mean): --  RR: --  SpO2: --    Orthostatic VS  08-14-24 @ 08:46  Lying BP: --/-- HR: --  Sitting BP: 114/64 HR: 77  Standing BP: 110/72 HR: 78  Site: upper left arm  Mode: electronic  Orthostatic VS  08-13-24 @ 19:56  Lying BP: --/-- HR: --  Sitting BP: 132/71 HR: 88  Standing BP: 134/72 HR: 102  Site: --  Mode: --  Orthostatic VS  08-13-24 @ 08:42  Lying BP: 127/72 HR: 62  Sitting BP: 118/68 HR: 86  Standing BP: --/-- HR: --  Site: --  Mode: electronic  Orthostatic VS  08-12-24 @ 19:16  Lying BP: --/-- HR: --  Sitting BP: 112/63 HR: 89  Standing BP: 104/69 HR: 94  Site: --  Mode: --

## 2024-08-14 NOTE — BH INPATIENT PSYCHIATRY PROGRESS NOTE - NSBHMETABOLIC_PSY_ALL_CORE_FT
BMI: BMI (kg/m2): 22.9 (08-10-24 @ 09:37)  HbA1c:   Glucose:   BP: --Vital Signs Last 24 Hrs  T(C): 36.3 (08-14-24 @ 08:46), Max: 36.3 (08-14-24 @ 08:46)  T(F): 97.4 (08-14-24 @ 08:46), Max: 97.4 (08-14-24 @ 08:46)  HR: --  BP: --  BP(mean): --  RR: --  SpO2: --    Orthostatic VS  08-14-24 @ 08:46  Lying BP: --/-- HR: --  Sitting BP: 114/64 HR: 77  Standing BP: 110/72 HR: 78  Site: upper left arm  Mode: electronic  Orthostatic VS  08-13-24 @ 19:56  Lying BP: --/-- HR: --  Sitting BP: 132/71 HR: 88  Standing BP: 134/72 HR: 102  Site: --  Mode: --  Orthostatic VS  08-13-24 @ 08:42  Lying BP: 127/72 HR: 62  Sitting BP: 118/68 HR: 86  Standing BP: --/-- HR: --  Site: --  Mode: electronic  Orthostatic VS  08-12-24 @ 19:16  Lying BP: --/-- HR: --  Sitting BP: 112/63 HR: 89  Standing BP: 104/69 HR: 94  Site: --  Mode: --    Lipid Panel:

## 2024-08-14 NOTE — BH INPATIENT PSYCHIATRY PROGRESS NOTE - CURRENT MEDICATION
MEDICATIONS  (STANDING):  paliperidone ER 3 milliGRAM(s) Oral at bedtime    MEDICATIONS  (PRN):  melatonin. 3 milliGRAM(s) Oral at bedtime PRN Insomnia  OLANZapine 5 milliGRAM(s) Oral every 8 hours PRN agitation  OLANZapine Injectable 5 milliGRAM(s) IntraMuscular Once PRN agitation

## 2024-08-14 NOTE — BH INPATIENT PSYCHIATRY PROGRESS NOTE - NSBHFUPINTERVALHXFT_PSY_A_CORE
Chart and history reviewed and discussed with treatment team. No events reported overnight. pt seen sitting alone during fresh air break. Pt presents with poor eye sight, superficially cooperative, vague in communication. Pt states that he does not want the HEALY No behavioral issues noted. Medication compliant. Sleep and appetite maintained. Denies SI/HI/AVH. Continue to monitor for safety.  Chart and history reviewed and discussed with treatment team. No events reported overnight. pt seen sitting alone during fresh air break. Pt presents with poor eye sight, superficially cooperative, vague in communication. Pt states that he does not want the HEALY because he "wants to be in control of his medications." Attempted to educate pt on benefits of HEALY and risk of decompensation and need for AOT again, though pt not in agreement at this time. Discussed increasing dose with pt not in agreement stating that this dose is "fine." No behavioral issues noted. Medication compliant. Sleep and appetite maintained. Denies SI/HI/AVH. Continue to monitor for safety.

## 2024-08-15 PROCEDURE — 99232 SBSQ HOSP IP/OBS MODERATE 35: CPT

## 2024-08-15 RX ORDER — PALIPERIDONE 3 MG/1
4.5 TABLET, EXTENDED RELEASE ORAL AT BEDTIME
Refills: 0 | Status: DISCONTINUED | OUTPATIENT
Start: 2024-08-15 | End: 2024-08-16

## 2024-08-15 RX ADMIN — PALIPERIDONE 4.5 MILLIGRAM(S): 3 TABLET, EXTENDED RELEASE ORAL at 20:23

## 2024-08-15 RX ADMIN — Medication 3 MILLIGRAM(S): at 00:42

## 2024-08-15 NOTE — BH INPATIENT PSYCHIATRY PROGRESS NOTE - NSBHASSESSSUMMFT_PSY_ALL_CORE
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 with hx of schizophrenia vs schizoaffective disorder, substance use (daily alcohol and cannabis use), non adherent to meds and psych aftercare, with prior hx of psych admissions including (last [facility] L3 11/1/20222-12/20/2022), was on AOT (discontinued Jan 2024). Tonight, presented to the ED BIB EMS activated by parents due to erratic behavior; agitated and displaying bizarre behavior at home. On eval, patient is oddly-related, vague, and evasive although superficially cooperative, with tangential thought process and over productive speech. Reports medication nonadherent, use of 1-2 beers daily and cannabis daily. Patient denies all psych symptoms and history, showing poor insight into his illness. Per family patient has been more agitated lately, with worsening sleep and appetite. Today, pt jumped on furniture screaming, also recently kicked a door down. Patient has also been reporting command auditory hallucinations to family (pt denies this). Family states patient is not at baseline and is advocating for admission. Updated patient's mother on disposition and answered questions.    Treatment Plan  1. admitted to unit, legal status 9.27  2. safety  - routine checks as pt denies SIIP/HIIP and is able to contract for safety  - haldol and ativan PO/IM PRN for agitation and anxiety  - patient states he has a gun license, would verify that patient is SAFE acted prior to discharge  3. psychiatric  - stop risperidal  - increase invega 4.5mg for psychosis  - past trials: invega sustenna HEALY 39mg (last given Jan 2024), haldol (EPS), risperidal (galactorrhea)  4. medical  -  5. encourage I/G/M therapy  6. dispo  - patient states he has a gun license, would verify that patient is SAFE acted prior to discharge

## 2024-08-15 NOTE — BH INPATIENT PSYCHIATRY PROGRESS NOTE - NSBHMETABOLIC_PSY_ALL_CORE_FT
BMI: BMI (kg/m2): 22.9 (08-10-24 @ 09:37)  HbA1c:   Glucose:   BP: --Vital Signs Last 24 Hrs  T(C): 36.6 (08-15-24 @ 07:29), Max: 36.9 (08-14-24 @ 19:10)  T(F): 97.8 (08-15-24 @ 07:29), Max: 98.5 (08-14-24 @ 19:10)  HR: --  BP: --  BP(mean): --  RR: 18 (08-15-24 @ 07:29) (18 - 18)  SpO2: --    Orthostatic VS  08-15-24 @ 07:29  Lying BP: --/-- HR: --  Sitting BP: 122/70 HR: 77  Standing BP: 123/79 HR: 83  Site: --  Mode: --  Orthostatic VS  08-14-24 @ 19:10  Lying BP: --/-- HR: --  Sitting BP: 117/70 HR: 71  Standing BP: 109/73 HR: 74  Site: --  Mode: --  Orthostatic VS  08-14-24 @ 08:46  Lying BP: --/-- HR: --  Sitting BP: 114/64 HR: 77  Standing BP: 110/72 HR: 78  Site: upper left arm  Mode: electronic  Orthostatic VS  08-13-24 @ 19:56  Lying BP: --/-- HR: --  Sitting BP: 132/71 HR: 88  Standing BP: 134/72 HR: 102  Site: --  Mode: --    Lipid Panel:

## 2024-08-15 NOTE — BH INPATIENT PSYCHIATRY PROGRESS NOTE - NSBHFUPINTERVALHXFT_PSY_A_CORE
Chart and history reviewed and discussed with treatment team. No events reported overnight. Pt seen enjoying fresh air break. Pt more engaged and sociable on approach, improved and maintained eye contact throughout assessment. Pt reports improvement to sleep and mood with invega, open to increasing dose for further symptom management. Pt less guarded, less odd and has been noted to be engaging appropriately with others. No behavioral issues noted. Medication compliant. Sleep and appetite maintained. Denies SI/HI/AVH. Continue to monitor for safety.

## 2024-08-15 NOTE — BH INPATIENT PSYCHIATRY PROGRESS NOTE - NSBHCHARTREVIEWVS_PSY_A_CORE FT
Physical Therapy  Patient not seen in therapy today.     Patient with nursing    Re-attempt plan: later today, tomorrow and per established plan of care    RN in room, restarting peritoneal dialysis.             OBJECTIVE                                                                                                                                      Vital Signs Last 24 Hrs  T(C): 36.6 (08-15-24 @ 07:29), Max: 36.9 (08-14-24 @ 19:10)  T(F): 97.8 (08-15-24 @ 07:29), Max: 98.5 (08-14-24 @ 19:10)  HR: --  BP: --  BP(mean): --  RR: 18 (08-15-24 @ 07:29) (18 - 18)  SpO2: --    Orthostatic VS  08-15-24 @ 07:29  Lying BP: --/-- HR: --  Sitting BP: 122/70 HR: 77  Standing BP: 123/79 HR: 83  Site: --  Mode: --  Orthostatic VS  08-14-24 @ 19:10  Lying BP: --/-- HR: --  Sitting BP: 117/70 HR: 71  Standing BP: 109/73 HR: 74  Site: --  Mode: --  Orthostatic VS  08-14-24 @ 08:46  Lying BP: --/-- HR: --  Sitting BP: 114/64 HR: 77  Standing BP: 110/72 HR: 78  Site: upper left arm  Mode: electronic  Orthostatic VS  08-13-24 @ 19:56  Lying BP: --/-- HR: --  Sitting BP: 132/71 HR: 88  Standing BP: 134/72 HR: 102  Site: --  Mode: --

## 2024-08-16 PROCEDURE — 99232 SBSQ HOSP IP/OBS MODERATE 35: CPT

## 2024-08-16 RX ORDER — PALIPERIDONE 3 MG/1
3 TABLET, EXTENDED RELEASE ORAL AT BEDTIME
Refills: 0 | Status: DISCONTINUED | OUTPATIENT
Start: 2024-08-16 | End: 2024-08-19

## 2024-08-16 RX ADMIN — PALIPERIDONE 3 MILLIGRAM(S): 3 TABLET, EXTENDED RELEASE ORAL at 20:51

## 2024-08-16 NOTE — BH INPATIENT PSYCHIATRY PROGRESS NOTE - NSBHATTESTAPPBILLTIME_PSY_A_CORE
I attest my time as PATSY is greater than 50% of the total combined time spent on qualifying patient care activities. I have reviewed and verified the documentation.

## 2024-08-16 NOTE — BH INPATIENT PSYCHIATRY PROGRESS NOTE - NSBHCHARTREVIEWVS_PSY_A_CORE FT
Vital Signs Last 24 Hrs  T(C): 36.5 (08-16-24 @ 08:24), Max: 36.7 (08-15-24 @ 19:13)  T(F): 97.7 (08-16-24 @ 08:24), Max: 98 (08-15-24 @ 19:13)  HR: --  BP: --  BP(mean): --  RR: 17 (08-16-24 @ 07:16) (17 - 17)  SpO2: --    Orthostatic VS  08-16-24 @ 08:24  Lying BP: --/-- HR: --  Sitting BP: 109/69 HR: 73  Standing BP: 110/74 HR: 77  Site: upper left arm  Mode: electronic  Orthostatic VS  08-15-24 @ 19:13  Lying BP: --/-- HR: --  Sitting BP: 128/80 HR: 65  Standing BP: 110/74 HR: 74  Site: --  Mode: --  Orthostatic VS  08-15-24 @ 07:29  Lying BP: --/-- HR: --  Sitting BP: 122/70 HR: 77  Standing BP: 123/79 HR: 83  Site: --  Mode: --  Orthostatic VS  08-14-24 @ 19:10  Lying BP: --/-- HR: --  Sitting BP: 117/70 HR: 71  Standing BP: 109/73 HR: 74  Site: --  Mode: --

## 2024-08-16 NOTE — BH INPATIENT PSYCHIATRY PROGRESS NOTE - NSBHASSESSSUMMFT_PSY_ALL_CORE
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 with hx of schizophrenia vs schizoaffective disorder, substance use (daily alcohol and cannabis use), non adherent to meds and psych aftercare, with prior hx of psych admissions including (last [facility] L[facility] 11/1/20222-12/20/2022), was on AOT (discontinued Jan 2024). Tonight, presented to the ED BIB EMS activated by parents due to erratic behavior; agitated and displaying bizarre behavior at home. On eval, patient is oddly-related, vague, and evasive although superficially cooperative, with tangential thought process and over productive speech. Reports medication nonadherent, use of 1-2 beers daily and cannabis daily. Patient denies all psych symptoms and history, showing poor insight into his illness. Per family patient has been more agitated lately, with worsening sleep and appetite. Today, pt jumped on furniture screaming, also recently kicked a door down. Patient has also been reporting command auditory hallucinations to family (pt denies this). Family states patient is not at baseline and is advocating for admission. Updated patient's mother on disposition and answered questions.    Treatment Plan  1. admitted to unit, legal status 9.27  2. safety  - routine checks as pt denies SIIP/HIIP and is able to contract for safety  - haldol and ativan PO/IM PRN for agitation and anxiety  - patient states he has a gun license, would verify that patient is SAFE acted prior to discharge  3. psychiatric  - stop risperidal  - c/w invega 3mg for psychosis  - past trials: invega sustenna HEALY 39mg (last given Jan 2024), haldol (EPS), risperidal (galactorrhea)  4. medical  -  5. encourage I/G/M therapy  6. dispo  - patient states he has a gun license, would verify that patient is SAFE acted prior to discharge

## 2024-08-16 NOTE — BH INPATIENT PSYCHIATRY PROGRESS NOTE - NSBHMETABOLIC_PSY_ALL_CORE_FT
BMI: BMI (kg/m2): 22.9 (08-10-24 @ 09:37)  HbA1c:   Glucose:   BP: --Vital Signs Last 24 Hrs  T(C): 36.5 (08-16-24 @ 08:24), Max: 36.7 (08-15-24 @ 19:13)  T(F): 97.7 (08-16-24 @ 08:24), Max: 98 (08-15-24 @ 19:13)  HR: --  BP: --  BP(mean): --  RR: 17 (08-16-24 @ 07:16) (17 - 17)  SpO2: --    Orthostatic VS  08-16-24 @ 08:24  Lying BP: --/-- HR: --  Sitting BP: 109/69 HR: 73  Standing BP: 110/74 HR: 77  Site: upper left arm  Mode: electronic  Orthostatic VS  08-15-24 @ 19:13  Lying BP: --/-- HR: --  Sitting BP: 128/80 HR: 65  Standing BP: 110/74 HR: 74  Site: --  Mode: --  Orthostatic VS  08-15-24 @ 07:29  Lying BP: --/-- HR: --  Sitting BP: 122/70 HR: 77  Standing BP: 123/79 HR: 83  Site: --  Mode: --  Orthostatic VS  08-14-24 @ 19:10  Lying BP: --/-- HR: --  Sitting BP: 117/70 HR: 71  Standing BP: 109/73 HR: 74  Site: --  Mode: --    Lipid Panel:

## 2024-08-17 RX ADMIN — PALIPERIDONE 3 MILLIGRAM(S): 3 TABLET, EXTENDED RELEASE ORAL at 20:53

## 2024-08-18 RX ADMIN — OLANZAPINE 5 MILLIGRAM(S): 7.5 TABLET ORAL at 20:05

## 2024-08-18 RX ADMIN — Medication 3 MILLIGRAM(S): at 20:05

## 2024-08-18 RX ADMIN — PALIPERIDONE 3 MILLIGRAM(S): 3 TABLET, EXTENDED RELEASE ORAL at 20:05

## 2024-08-19 PROCEDURE — 99232 SBSQ HOSP IP/OBS MODERATE 35: CPT

## 2024-08-19 RX ORDER — PALIPERIDONE 3 MG/1
4.5 TABLET, EXTENDED RELEASE ORAL AT BEDTIME
Refills: 0 | Status: DISCONTINUED | OUTPATIENT
Start: 2024-08-19 | End: 2024-08-23

## 2024-08-19 RX ADMIN — PALIPERIDONE 4.5 MILLIGRAM(S): 3 TABLET, EXTENDED RELEASE ORAL at 20:07

## 2024-08-19 RX ADMIN — Medication 3 MILLIGRAM(S): at 20:08

## 2024-08-19 NOTE — BH INPATIENT PSYCHIATRY PROGRESS NOTE - NSBHCHARTREVIEWVS_PSY_A_CORE FT
Vital Signs Last 24 Hrs  T(C): 36.4 (08-18-24 @ 19:10), Max: 36.4 (08-18-24 @ 19:10)  T(F): 97.5 (08-18-24 @ 19:10), Max: 97.5 (08-18-24 @ 19:10)  HR: --  BP: --  BP(mean): --  RR: --  SpO2: --    Orthostatic VS  08-18-24 @ 19:10  Lying BP: --/-- HR: --  Sitting BP: 106/63 HR: 76  Standing BP: 111/68 HR: 80  Site: --  Mode: --  Orthostatic VS  08-17-24 @ 20:18  Lying BP: --/-- HR: --  Sitting BP: 127/89 HR: 100  Standing BP: 124/82 HR: 106  Site: --  Mode: --   Vital Signs Last 24 Hrs  T(C): 36.1 (08-19-24 @ 20:27), Max: 36.1 (08-19-24 @ 20:27)  T(F): 96.9 (08-19-24 @ 20:27), Max: 96.9 (08-19-24 @ 20:27)  HR: --  BP: --  BP(mean): --  RR: --  SpO2: --    Orthostatic VS  08-19-24 @ 20:27  Lying BP: --/-- HR: --  Sitting BP: 92/68 HR: 85  Standing BP: 116/70 HR: 80  Site: --  Mode: --  Orthostatic VS  08-18-24 @ 19:10  Lying BP: --/-- HR: --  Sitting BP: 106/63 HR: 76  Standing BP: 111/68 HR: 80  Site: --  Mode: --

## 2024-08-19 NOTE — BH INPATIENT PSYCHIATRY PROGRESS NOTE - NSBHMETABOLIC_PSY_ALL_CORE_FT
BMI: BMI (kg/m2): 22.9 (08-10-24 @ 09:37)  HbA1c:   Glucose:   BP: --Vital Signs Last 24 Hrs  T(C): 36.4 (08-18-24 @ 19:10), Max: 36.4 (08-18-24 @ 19:10)  T(F): 97.5 (08-18-24 @ 19:10), Max: 97.5 (08-18-24 @ 19:10)  HR: --  BP: --  BP(mean): --  RR: --  SpO2: --    Orthostatic VS  08-18-24 @ 19:10  Lying BP: --/-- HR: --  Sitting BP: 106/63 HR: 76  Standing BP: 111/68 HR: 80  Site: --  Mode: --  Orthostatic VS  08-17-24 @ 20:18  Lying BP: --/-- HR: --  Sitting BP: 127/89 HR: 100  Standing BP: 124/82 HR: 106  Site: --  Mode: --    Lipid Panel:  BMI: BMI (kg/m2): 22.9 (08-10-24 @ 09:37)  HbA1c:   Glucose:   BP: --Vital Signs Last 24 Hrs  T(C): 36.1 (08-19-24 @ 20:27), Max: 36.1 (08-19-24 @ 20:27)  T(F): 96.9 (08-19-24 @ 20:27), Max: 96.9 (08-19-24 @ 20:27)  HR: --  BP: --  BP(mean): --  RR: --  SpO2: --    Orthostatic VS  08-19-24 @ 20:27  Lying BP: --/-- HR: --  Sitting BP: 92/68 HR: 85  Standing BP: 116/70 HR: 80  Site: --  Mode: --  Orthostatic VS  08-18-24 @ 19:10  Lying BP: --/-- HR: --  Sitting BP: 106/63 HR: 76  Standing BP: 111/68 HR: 80  Site: --  Mode: --    Lipid Panel:

## 2024-08-19 NOTE — BH INPATIENT PSYCHIATRY PROGRESS NOTE - NSBHASSESSSUMMFT_PSY_ALL_CORE
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 with hx of schizophrenia vs schizoaffective disorder, substance use (daily alcohol and cannabis use), non adherent to meds and psych aftercare, with prior hx of psych admissions including (last [facility] 11/1/20222-12/20/2022), was on AOT (discontinued Jan 2024). Tonight, presented to the ED BIB EMS activated by parents due to erratic behavior; agitated and displaying bizarre behavior at home. On eval, patient is oddly-related, vague, and evasive although superficially cooperative, with tangential thought process and over productive speech. Reports medication nonadherent, use of 1-2 beers daily and cannabis daily. Patient denies all psych symptoms and history, showing poor insight into his illness. Per family patient has been more agitated lately, with worsening sleep and appetite. Today, pt jumped on furniture screaming, also recently kicked a door down. Patient has also been reporting command auditory hallucinations to family (pt denies this). Family states patient is not at baseline and is advocating for admission. Updated patient's mother on disposition and answered questions.    Treatment Plan  1. admitted to unit, legal status 9.27  2. safety  - routine checks as pt denies SIIP/HIIP and is able to contract for safety  - haldol and ativan PO/IM PRN for agitation and anxiety  - patient states he has a gun license, would verify that patient is SAFE acted prior to discharge  3. psychiatric  - stop risperidal  - increase invega 4.5mg for psychosis  - past trials: invega sustenna HEALY 39mg (last given Jan 2024), haldol (EPS), risperidal (galactorrhea)  4. medical  -  5. encourage I/G/M therapy  6. 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 with hx of schizophrenia vs schizoaffective disorder, substance use (daily alcohol and cannabis use), non adherent to meds and psych aftercare, with prior hx of psych admissions including (last [facility] 11/1/20222-12/20/2022), was on AOT (discontinued Jan 2024). Tonight, presented to the ED BIB EMS activated by parents due to erratic behavior; agitated and displaying bizarre behavior at home. On eval, patient is oddly-related, vague, and evasive although superficially cooperative, with tangential thought process and over productive speech. Reports medication nonadherent, use of 1-2 beers daily and cannabis daily. Patient denies all psych symptoms and history, showing poor insight into his illness. Per family patient has been more agitated lately, with worsening sleep and appetite. Today, pt jumped on furniture screaming, also recently kicked a door down. Patient has also been reporting command auditory hallucinations to family (pt denies this). Family states patient is not at baseline and is advocating for admission. Updated patient's mother on disposition and answered questions.    Treatment Plan  1. admitted to unit, legal status 9.27  2. safety  - routine checks as pt denies SIIP/HIIP and is able to contract for safety  - haldol and ativan PO/IM PRN for agitation and anxiety  - patient states he has a gun license, would verify that patient is SAFE acted prior to discharge  3. psychiatric  - stop risperidal  - increase invega 4.5mg for psychosis  - past trials: invega sustenna HEALY 39mg (last given Jan 2024), haldol (EPS), risperidal (galactorrhea)  4. medical  -  5. encourage I/G/M therapy  6. dispo  - [facility] referral  - patient states he has a gun license, would verify that patient is SAFE acted prior to discharge

## 2024-08-19 NOTE — BH INPATIENT PSYCHIATRY PROGRESS NOTE - NSBHFUPINTERVALHXFT_PSY_A_CORE
Chart and history reviewed and discussed with treatment team. No events reported overnight. Seen with SW. Pt seen outside enjoying fresh air break. Discussed discharge planning, with pt in agreement for referral to be made to [facility]. Pt in agreement for increase in invega, though does not want HEALY at this time. No behavioral issues noted. Medication compliant. Sleep and appetite maintained. Denies SI/HI/AVH. Continue to monitor for safety.

## 2024-08-19 NOTE — BH INPATIENT PSYCHIATRY PROGRESS NOTE - CURRENT MEDICATION
MEDICATIONS  (STANDING):  paliperidone ER 3 milliGRAM(s) Oral at bedtime    MEDICATIONS  (PRN):  melatonin. 3 milliGRAM(s) Oral at bedtime PRN Insomnia  OLANZapine 5 milliGRAM(s) Oral every 8 hours PRN agitation  OLANZapine Injectable 5 milliGRAM(s) IntraMuscular Once PRN agitation   MEDICATIONS  (STANDING):  paliperidone ER 4.5 milliGRAM(s) Oral at bedtime    MEDICATIONS  (PRN):  melatonin. 3 milliGRAM(s) Oral at bedtime PRN Insomnia  OLANZapine 5 milliGRAM(s) Oral every 8 hours PRN agitation  OLANZapine Injectable 5 milliGRAM(s) IntraMuscular Once PRN agitation

## 2024-08-20 PROCEDURE — 99232 SBSQ HOSP IP/OBS MODERATE 35: CPT

## 2024-08-20 RX ADMIN — PALIPERIDONE 4.5 MILLIGRAM(S): 3 TABLET, EXTENDED RELEASE ORAL at 20:07

## 2024-08-20 RX ADMIN — Medication 3 MILLIGRAM(S): at 20:06

## 2024-08-20 NOTE — BH INPATIENT PSYCHIATRY PROGRESS NOTE - CURRENT MEDICATION
MEDICATIONS  (STANDING):  paliperidone ER 4.5 milliGRAM(s) Oral at bedtime    MEDICATIONS  (PRN):  melatonin. 3 milliGRAM(s) Oral at bedtime PRN Insomnia  OLANZapine 5 milliGRAM(s) Oral every 8 hours PRN agitation  OLANZapine Injectable 5 milliGRAM(s) IntraMuscular Once PRN agitation

## 2024-08-20 NOTE — BH TREATMENT PLAN - NSTXDCOTHRINTERSW_PSY_ALL_CORE
SW will provide support, insight, discharge planning, psychoeducation, and will maintain collateral contact as needed.
SW will provide support, insight, discharge planning, psychoeducation, and will maintain contact with identified supports.

## 2024-08-20 NOTE — BH TREATMENT PLAN - NSDCCRITERIA_PSY_ALL_CORE
symptom management, safety planning, care coordination 
symptom management, safety planning, care coordination

## 2024-08-20 NOTE — BH TREATMENT PLAN - NSTXIMPULSINTERPR_PSY_ALL_CORE
Pt made some progress. Pt has maintained good behavioral control over this past week. Pt has identified his coping skills such as go for walk. Pt stated he know spend sometime to play music even he cannot really notes, but he should enjoy music and arts. Writer provided support. Pt is disorganized, repeatedly make comments about inheritance, law and he is entitled. Pt stated he is entitled to inheritance his parent’s wealth and money. Pt admitted he had arguments with his parents about inheritance for years and now he stopped, but according to law he is entitled. Pt has minimized all symptom and showed poor insight into his illness. Over this past week, pt attended 80% of group attendance. During the group session, pt was able to tolerate group structure, not attentive, participated with prompting. Pt was visible on the unit, self-isolated, interacts minimally with others. Pt showed fair ADLs. Writer will encourage pt continues to pause before act out.
Psychiatric rehabilitation recommends patient attends 2-3 groups per day, engages in individual sessions and participates in activities on the milieu in order to explore strategies to improve behavioral control

## 2024-08-20 NOTE — BH TREATMENT PLAN - NSCMSPTSTRENGTHS_PSY_ALL_CORE
Future/goal oriented/Physically healthy/Supportive family
Future/goal oriented/Physically healthy/Supportive family

## 2024-08-20 NOTE — BH INPATIENT PSYCHIATRY PROGRESS NOTE - NSBHASSESSSUMMFT_PSY_ALL_CORE
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 with hx of schizophrenia vs schizoaffective disorder, substance use (daily alcohol and cannabis use), non adherent to meds and psych aftercare, with prior hx of psych admissions including (last [facility] L[facility] 11/1/20222-12/20/2022), was on AOT (discontinued Jan 2024). Tonight, presented to the ED BIB EMS activated by parents due to erratic behavior; agitated and displaying bizarre behavior at home. On eval, patient is oddly-related, vague, and evasive although superficially cooperative, with tangential thought process and over productive speech. Reports medication nonadherent, use of 1-2 beers daily and cannabis daily. Patient denies all psych symptoms and history, showing poor insight into his illness. Per family patient has been more agitated lately, with worsening sleep and appetite. Today, pt jumped on furniture screaming, also recently kicked a door down. Patient has also been reporting command auditory hallucinations to family (pt denies this). Family states patient is not at baseline and is advocating for admission. Updated patient's mother on disposition and answered questions.    Treatment Plan  1. admitted to unit, legal status 9.27  2. safety  - routine checks as pt denies SIIP/HIIP and is able to contract for safety  - haldol and ativan PO/IM PRN for agitation and anxiety  - patient states he has a gun license, would verify that patient is SAFE acted prior to discharge  3. psychiatric  - stop risperidal  - c/w invega 4.5mg for psychosis  - past trials: invega sustenna HEALY 39mg (last given Jan 2024), haldol (EPS), risperidal (galactorrhea)  4. medical  -  5. encourage I/G/M therapy  6. dispo  - [facility] referral  - patient states he has a gun license, would verify that patient is SAFE acted prior to discharge

## 2024-08-20 NOTE — BH INPATIENT PSYCHIATRY PROGRESS NOTE - NSBHMETABOLIC_PSY_ALL_CORE_FT
BMI: BMI (kg/m2): 22.9 (08-10-24 @ 09:37)  HbA1c:   Glucose:   BP: --Vital Signs Last 24 Hrs  T(C): 36.1 (08-19-24 @ 20:27), Max: 36.1 (08-19-24 @ 20:27)  T(F): 96.9 (08-19-24 @ 20:27), Max: 96.9 (08-19-24 @ 20:27)  HR: --  BP: --  BP(mean): --  RR: --  SpO2: --    Orthostatic VS  08-19-24 @ 20:27  Lying BP: --/-- HR: --  Sitting BP: 92/68 HR: 85  Standing BP: 116/70 HR: 80  Site: --  Mode: --  Orthostatic VS  08-18-24 @ 19:10  Lying BP: --/-- HR: --  Sitting BP: 106/63 HR: 76  Standing BP: 111/68 HR: 80  Site: --  Mode: --    Lipid Panel:

## 2024-08-20 NOTE — BH TREATMENT PLAN - NSTXPATIENTPARTICIPATE_PSY_ALL_CORE
Patient participated in identification of needs/problems/goals for treatment/Patient participated in development of after care plan
Patient participated in identification of needs/problems/goals for treatment/Patient participated in defining interventions/Patient participated in development of after care plan

## 2024-08-20 NOTE — BH INPATIENT PSYCHIATRY PROGRESS NOTE - NSBHCHARTREVIEWVS_PSY_A_CORE FT
Vital Signs Last 24 Hrs  T(C): 36.1 (08-19-24 @ 20:27), Max: 36.1 (08-19-24 @ 20:27)  T(F): 96.9 (08-19-24 @ 20:27), Max: 96.9 (08-19-24 @ 20:27)  HR: --  BP: --  BP(mean): --  RR: --  SpO2: --    Orthostatic VS  08-19-24 @ 20:27  Lying BP: --/-- HR: --  Sitting BP: 92/68 HR: 85  Standing BP: 116/70 HR: 80  Site: --  Mode: --  Orthostatic VS  08-18-24 @ 19:10  Lying BP: --/-- HR: --  Sitting BP: 106/63 HR: 76  Standing BP: 111/68 HR: 80  Site: --  Mode: --

## 2024-08-20 NOTE — BH INPATIENT PSYCHIATRY PROGRESS NOTE - NSBHFUPINTERVALHXFT_PSY_A_CORE
Chart and history reviewed and discussed with treatment team. No events reported overnight. Pt reports improvement to sleep and mood with increase in invega. Discussed [facility] discharge plan, with pt still in agreement with plan. Notable improvement to mood, affect, and eye contact, though pt remains guarded at times. No behavioral issues noted. Medication compliant. Sleep and appetite maintained. Denies SI/HI/AVH. Continue to monitor for safety.

## 2024-08-21 PROCEDURE — 99232 SBSQ HOSP IP/OBS MODERATE 35: CPT

## 2024-08-21 RX ADMIN — Medication 3 MILLIGRAM(S): at 20:13

## 2024-08-21 RX ADMIN — PALIPERIDONE 4.5 MILLIGRAM(S): 3 TABLET, EXTENDED RELEASE ORAL at 20:11

## 2024-08-21 NOTE — BH INPATIENT PSYCHIATRY PROGRESS NOTE - NSBHCHARTREVIEWVS_PSY_A_CORE FT
Vital Signs Last 24 Hrs  T(C): 36.3 (08-21-24 @ 08:49), Max: 36.3 (08-21-24 @ 08:49)  T(F): 97.3 (08-21-24 @ 08:49), Max: 97.3 (08-21-24 @ 08:49)  HR: --  BP: --  BP(mean): --  RR: 17 (08-21-24 @ 08:49) (17 - 17)  SpO2: --    Orthostatic VS  08-21-24 @ 08:49  Lying BP: --/-- HR: --  Sitting BP: 107/66 HR: 79  Standing BP: 116/64 HR: 104  Site: upper left arm  Mode: electronic  Orthostatic VS  08-19-24 @ 20:27  Lying BP: --/-- HR: --  Sitting BP: 92/68 HR: 85  Standing BP: 116/70 HR: 80  Site: --  Mode: --   Vital Signs Last 24 Hrs  T(C): 36.7 (08-22-24 @ 06:10), Max: 36.7 (08-22-24 @ 06:10)  T(F): 98 (08-22-24 @ 06:10), Max: 98 (08-22-24 @ 06:10)  HR: --  BP: --  BP(mean): --  RR: --  SpO2: --    Orthostatic VS  08-22-24 @ 06:10  Lying BP: --/-- HR: --  Sitting BP: 109/72 HR: 59  Standing BP: 115/76 HR: 74  Site: --  Mode: --  Orthostatic VS  08-21-24 @ 19:33  Lying BP: --/-- HR: --  Sitting BP: 126/79 HR: 76  Standing BP: 115/70 HR: 72  Site: --  Mode: --  Orthostatic VS  08-21-24 @ 08:49  Lying BP: --/-- HR: --  Sitting BP: 107/66 HR: 79  Standing BP: 116/64 HR: 104  Site: upper left arm  Mode: electronic

## 2024-08-21 NOTE — BH INPATIENT PSYCHIATRY PROGRESS NOTE - NSBHFUPINTERVALHXFT_PSY_A_CORE
Chart and history reviewed and discussed with treatment team. No events reported overnight. Pt reports improvement to sleep and mood. Notable improvement to mood, affect, and eye contact, though pt remains guarded at times. Seen resting in bed, stating that he feels "fine" and denying any issues. Pt later seen visible on the unit, enjoying fresh air break, socializing with others. No behavioral issues noted. Medication compliant. Sleep and appetite maintained. Denies SI/HI/AVH. Continue to monitor for safety.     NP and SW spoke with pt's mom after obtaining consent. Discussed mom's concerns regarding medication compliance and discharge planning.

## 2024-08-21 NOTE — BH INPATIENT PSYCHIATRY PROGRESS NOTE - NSBHMETABOLIC_PSY_ALL_CORE_FT
BMI: BMI (kg/m2): 22.9 (08-10-24 @ 09:37)  HbA1c:   Glucose:   BP: --Vital Signs Last 24 Hrs  T(C): 36.3 (08-21-24 @ 08:49), Max: 36.3 (08-21-24 @ 08:49)  T(F): 97.3 (08-21-24 @ 08:49), Max: 97.3 (08-21-24 @ 08:49)  HR: --  BP: --  BP(mean): --  RR: 17 (08-21-24 @ 08:49) (17 - 17)  SpO2: --    Orthostatic VS  08-21-24 @ 08:49  Lying BP: --/-- HR: --  Sitting BP: 107/66 HR: 79  Standing BP: 116/64 HR: 104  Site: upper left arm  Mode: electronic  Orthostatic VS  08-19-24 @ 20:27  Lying BP: --/-- HR: --  Sitting BP: 92/68 HR: 85  Standing BP: 116/70 HR: 80  Site: --  Mode: --    Lipid Panel:  BMI: BMI (kg/m2): 22.9 (08-10-24 @ 09:37)  HbA1c:   Glucose:   BP: --Vital Signs Last 24 Hrs  T(C): 36.7 (08-22-24 @ 06:10), Max: 36.7 (08-22-24 @ 06:10)  T(F): 98 (08-22-24 @ 06:10), Max: 98 (08-22-24 @ 06:10)  HR: --  BP: --  BP(mean): --  RR: --  SpO2: --    Orthostatic VS  08-22-24 @ 06:10  Lying BP: --/-- HR: --  Sitting BP: 109/72 HR: 59  Standing BP: 115/76 HR: 74  Site: --  Mode: --  Orthostatic VS  08-21-24 @ 19:33  Lying BP: --/-- HR: --  Sitting BP: 126/79 HR: 76  Standing BP: 115/70 HR: 72  Site: --  Mode: --  Orthostatic VS  08-21-24 @ 08:49  Lying BP: --/-- HR: --  Sitting BP: 107/66 HR: 79  Standing BP: 116/64 HR: 104  Site: upper left arm  Mode: electronic    Lipid Panel:

## 2024-08-22 PROCEDURE — 99232 SBSQ HOSP IP/OBS MODERATE 35: CPT

## 2024-08-22 RX ADMIN — Medication 3 MILLIGRAM(S): at 20:10

## 2024-08-22 RX ADMIN — PALIPERIDONE 4.5 MILLIGRAM(S): 3 TABLET, EXTENDED RELEASE ORAL at 20:10

## 2024-08-22 NOTE — BH INPATIENT PSYCHIATRY PROGRESS NOTE - NSBHFUPINTERVALHXFT_PSY_A_CORE
Chart and history reviewed and discussed with treatment team. No events reported overnight. Seen with SW. Pt in agreement for substance counseling and to convert to HEALY. Pt fixated on "being an adult" and discussed how he wants to "live with his parents and be on disability" for about a year to ensure stability and to help him stay out of the hospital. No behavioral issues noted. Medication compliant. Sleep and appetite maintained. Denies SI/HI/AVH. Continue to monitor for safety.

## 2024-08-22 NOTE — BH INPATIENT PSYCHIATRY PROGRESS NOTE - NSBHMETABOLIC_PSY_ALL_CORE_FT
BMI: BMI (kg/m2): 22.9 (08-10-24 @ 09:37)  HbA1c:   Glucose:   BP: --Vital Signs Last 24 Hrs  T(C): 36.7 (08-22-24 @ 06:10), Max: 36.7 (08-22-24 @ 06:10)  T(F): 98 (08-22-24 @ 06:10), Max: 98 (08-22-24 @ 06:10)  HR: --  BP: --  BP(mean): --  RR: --  SpO2: --    Orthostatic VS  08-22-24 @ 06:10  Lying BP: --/-- HR: --  Sitting BP: 109/72 HR: 59  Standing BP: 115/76 HR: 74  Site: --  Mode: --  Orthostatic VS  08-21-24 @ 19:33  Lying BP: --/-- HR: --  Sitting BP: 126/79 HR: 76  Standing BP: 115/70 HR: 72  Site: --  Mode: --  Orthostatic VS  08-21-24 @ 08:49  Lying BP: --/-- HR: --  Sitting BP: 107/66 HR: 79  Standing BP: 116/64 HR: 104  Site: upper left arm  Mode: electronic    Lipid Panel:  BMI: BMI (kg/m2): 22.9 (08-10-24 @ 09:37)  HbA1c:   Glucose:   BP: --Vital Signs Last 24 Hrs  T(C): 36.4 (08-23-24 @ 08:09), Max: 36.5 (08-22-24 @ 19:47)  T(F): 97.6 (08-23-24 @ 08:09), Max: 97.7 (08-22-24 @ 19:47)  HR: --  BP: --  BP(mean): --  RR: --  SpO2: --    Orthostatic VS  08-23-24 @ 08:09  Lying BP: --/-- HR: --  Sitting BP: 108/61 HR: 92  Standing BP: 115/81 HR: 107  Site: --  Mode: --  Orthostatic VS  08-22-24 @ 19:47  Lying BP: --/-- HR: --  Sitting BP: 131/66 HR: 81  Standing BP: 119/80 HR: 84  Site: --  Mode: --  Orthostatic VS  08-22-24 @ 06:10  Lying BP: --/-- HR: --  Sitting BP: 109/72 HR: 59  Standing BP: 115/76 HR: 74  Site: --  Mode: --  Orthostatic VS  08-21-24 @ 19:33  Lying BP: --/-- HR: --  Sitting BP: 126/79 HR: 76  Standing BP: 115/70 HR: 72  Site: --  Mode: --    Lipid Panel:

## 2024-08-22 NOTE — BH INPATIENT PSYCHIATRY PROGRESS NOTE - NSBHASSESSSUMMFT_PSY_ALL_CORE
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 with hx of schizophrenia vs schizoaffective disorder, substance use (daily alcohol and cannabis use), non adherent to meds and psych aftercare, with prior hx of psych admissions including (last [facility] L[facility] 11/1/20222-12/20/2022), was on AOT (discontinued Jan 2024). Tonight, presented to the ED BIB EMS activated by parents due to erratic behavior; agitated and displaying bizarre behavior at home. On eval, patient is oddly-related, vague, and evasive although superficially cooperative, with tangential thought process and over productive speech. Reports medication nonadherent, use of 1-2 beers daily and cannabis daily. Patient denies all psych symptoms and history, showing poor insight into his illness. Per family patient has been more agitated lately, with worsening sleep and appetite. Today, pt jumped on furniture screaming, also recently kicked a door down. Patient has also been reporting command auditory hallucinations to family (pt denies this). Family states patient is not at baseline and is advocating for admission. Updated patient's mother on disposition and answered questions.    Treatment Plan  1. admitted to unit, legal status 9.27  2. safety  - routine checks as pt denies SIIP/HIIP and is able to contract for safety  - haldol and ativan PO/IM PRN for agitation and anxiety  - patient states he has a gun license, would verify that patient is SAFE acted prior to discharge  3. psychiatric  - stop risperidal  - c/w invega 4.5mg for psychosis  	- convert to invega sustenna HEALY  - past trials: invega sustenna HEALY 39mg (last given Jan 2024), haldol (EPS), risperidal (galactorrhea)  4. medical  -  5. encourage I/G/M therapy  6. dispo  - [facility] referral  - patient states he has a gun license, would verify that patient is SAFE acted prior to discharge

## 2024-08-22 NOTE — BH INPATIENT PSYCHIATRY PROGRESS NOTE - NSBHCHARTREVIEWVS_PSY_A_CORE FT
Vital Signs Last 24 Hrs  T(C): 36.7 (08-22-24 @ 06:10), Max: 36.7 (08-22-24 @ 06:10)  T(F): 98 (08-22-24 @ 06:10), Max: 98 (08-22-24 @ 06:10)  HR: --  BP: --  BP(mean): --  RR: --  SpO2: --    Orthostatic VS  08-22-24 @ 06:10  Lying BP: --/-- HR: --  Sitting BP: 109/72 HR: 59  Standing BP: 115/76 HR: 74  Site: --  Mode: --  Orthostatic VS  08-21-24 @ 19:33  Lying BP: --/-- HR: --  Sitting BP: 126/79 HR: 76  Standing BP: 115/70 HR: 72  Site: --  Mode: --  Orthostatic VS  08-21-24 @ 08:49  Lying BP: --/-- HR: --  Sitting BP: 107/66 HR: 79  Standing BP: 116/64 HR: 104  Site: upper left arm  Mode: electronic   Vital Signs Last 24 Hrs  T(C): 36.4 (08-23-24 @ 08:09), Max: 36.5 (08-22-24 @ 19:47)  T(F): 97.6 (08-23-24 @ 08:09), Max: 97.7 (08-22-24 @ 19:47)  HR: --  BP: --  BP(mean): --  RR: --  SpO2: --    Orthostatic VS  08-23-24 @ 08:09  Lying BP: --/-- HR: --  Sitting BP: 108/61 HR: 92  Standing BP: 115/81 HR: 107  Site: --  Mode: --  Orthostatic VS  08-22-24 @ 19:47  Lying BP: --/-- HR: --  Sitting BP: 131/66 HR: 81  Standing BP: 119/80 HR: 84  Site: --  Mode: --  Orthostatic VS  08-22-24 @ 06:10  Lying BP: --/-- HR: --  Sitting BP: 109/72 HR: 59  Standing BP: 115/76 HR: 74  Site: --  Mode: --  Orthostatic VS  08-21-24 @ 19:33  Lying BP: --/-- HR: --  Sitting BP: 126/79 HR: 76  Standing BP: 115/70 HR: 72  Site: --  Mode: --

## 2024-08-23 LAB
ALBUMIN SERPL ELPH-MCNC: 4.4 G/DL — SIGNIFICANT CHANGE UP (ref 3.3–5)
ALP SERPL-CCNC: 48 U/L — SIGNIFICANT CHANGE UP (ref 40–120)
ALT FLD-CCNC: 19 U/L — SIGNIFICANT CHANGE UP (ref 4–41)
ANION GAP SERPL CALC-SCNC: 12 MMOL/L — SIGNIFICANT CHANGE UP (ref 7–14)
AST SERPL-CCNC: 19 U/L — SIGNIFICANT CHANGE UP (ref 4–40)
BILIRUB SERPL-MCNC: 0.5 MG/DL — SIGNIFICANT CHANGE UP (ref 0.2–1.2)
BUN SERPL-MCNC: 19 MG/DL — SIGNIFICANT CHANGE UP (ref 7–23)
CALCIUM SERPL-MCNC: 9.5 MG/DL — SIGNIFICANT CHANGE UP (ref 8.4–10.5)
CHLORIDE SERPL-SCNC: 103 MMOL/L — SIGNIFICANT CHANGE UP (ref 98–107)
CO2 SERPL-SCNC: 26 MMOL/L — SIGNIFICANT CHANGE UP (ref 22–31)
CREAT SERPL-MCNC: 0.95 MG/DL — SIGNIFICANT CHANGE UP (ref 0.5–1.3)
EGFR: 113 ML/MIN/1.73M2 — SIGNIFICANT CHANGE UP
GLUCOSE SERPL-MCNC: 88 MG/DL — SIGNIFICANT CHANGE UP (ref 70–99)
POTASSIUM SERPL-MCNC: 4.4 MMOL/L — SIGNIFICANT CHANGE UP (ref 3.5–5.3)
POTASSIUM SERPL-SCNC: 4.4 MMOL/L — SIGNIFICANT CHANGE UP (ref 3.5–5.3)
PROT SERPL-MCNC: 7.3 G/DL — SIGNIFICANT CHANGE UP (ref 6–8.3)
SODIUM SERPL-SCNC: 141 MMOL/L — SIGNIFICANT CHANGE UP (ref 135–145)

## 2024-08-23 PROCEDURE — 99232 SBSQ HOSP IP/OBS MODERATE 35: CPT

## 2024-08-23 RX ORDER — PALIPERIDONE 3 MG/1
234 TABLET, EXTENDED RELEASE ORAL ONCE
Refills: 0 | Status: COMPLETED | OUTPATIENT
Start: 2024-08-23 | End: 2024-08-23

## 2024-08-23 RX ORDER — PALIPERIDONE 3 MG/1
156 TABLET, EXTENDED RELEASE ORAL ONCE
Refills: 0 | Status: COMPLETED | OUTPATIENT
Start: 2024-08-28 | End: 2024-08-28

## 2024-08-23 RX ADMIN — PALIPERIDONE 234 MILLIGRAM(S): 3 TABLET, EXTENDED RELEASE ORAL at 16:54

## 2024-08-23 NOTE — BH INPATIENT PSYCHIATRY PROGRESS NOTE - NSBHCHARTREVIEWVS_PSY_A_CORE FT
Vital Signs Last 24 Hrs  T(C): 36.4 (08-23-24 @ 08:09), Max: 36.5 (08-22-24 @ 19:47)  T(F): 97.6 (08-23-24 @ 08:09), Max: 97.7 (08-22-24 @ 19:47)  HR: --  BP: --  BP(mean): --  RR: 17 (08-23-24 @ 10:54) (17 - 17)  SpO2: --    Orthostatic VS  08-23-24 @ 08:09  Lying BP: --/-- HR: --  Sitting BP: 108/61 HR: 92  Standing BP: 115/81 HR: 107  Site: --  Mode: --  Orthostatic VS  08-22-24 @ 19:47  Lying BP: --/-- HR: --  Sitting BP: 131/66 HR: 81  Standing BP: 119/80 HR: 84  Site: --  Mode: --  Orthostatic VS  08-22-24 @ 06:10  Lying BP: --/-- HR: --  Sitting BP: 109/72 HR: 59  Standing BP: 115/76 HR: 74  Site: --  Mode: --  Orthostatic VS  08-21-24 @ 19:33  Lying BP: --/-- HR: --  Sitting BP: 126/79 HR: 76  Standing BP: 115/70 HR: 72  Site: --  Mode: --   Vital Signs Last 24 Hrs  T(C): 36.6 (08-26-24 @ 08:36), Max: 36.7 (08-25-24 @ 19:27)  T(F): 97.9 (08-26-24 @ 08:36), Max: 98 (08-25-24 @ 19:27)  HR: --  BP: --  BP(mean): --  RR: 16 (08-26-24 @ 08:36) (16 - 18)  SpO2: --    Orthostatic VS  08-26-24 @ 08:36  Lying BP: --/-- HR: --  Sitting BP: 92/53 HR: 72  Standing BP: 108/67 HR: 79  Site: --  Mode: --  Orthostatic VS  08-25-24 @ 19:27  Lying BP: --/-- HR: --  Sitting BP: 111/68 HR: 80  Standing BP: 105/72 HR: 83  Site: --  Mode: --  Orthostatic VS  08-25-24 @ 08:43  Lying BP: --/-- HR: --  Sitting BP: 101/59 HR: 79  Standing BP: --/-- HR: --  Site: upper left arm  Mode: electronic  Orthostatic VS  08-24-24 @ 19:08  Lying BP: --/-- HR: --  Sitting BP: 136/83 HR: 65  Standing BP: 120/72 HR: 108  Site: --  Mode: --

## 2024-08-23 NOTE — BH INPATIENT PSYCHIATRY PROGRESS NOTE - NSBHMETABOLIC_PSY_ALL_CORE_FT
BMI: BMI (kg/m2): 22.9 (08-10-24 @ 09:37)  HbA1c:   Glucose:   BP: --Vital Signs Last 24 Hrs  T(C): 36.4 (08-23-24 @ 08:09), Max: 36.5 (08-22-24 @ 19:47)  T(F): 97.6 (08-23-24 @ 08:09), Max: 97.7 (08-22-24 @ 19:47)  HR: --  BP: --  BP(mean): --  RR: 17 (08-23-24 @ 10:54) (17 - 17)  SpO2: --    Orthostatic VS  08-23-24 @ 08:09  Lying BP: --/-- HR: --  Sitting BP: 108/61 HR: 92  Standing BP: 115/81 HR: 107  Site: --  Mode: --  Orthostatic VS  08-22-24 @ 19:47  Lying BP: --/-- HR: --  Sitting BP: 131/66 HR: 81  Standing BP: 119/80 HR: 84  Site: --  Mode: --  Orthostatic VS  08-22-24 @ 06:10  Lying BP: --/-- HR: --  Sitting BP: 109/72 HR: 59  Standing BP: 115/76 HR: 74  Site: --  Mode: --  Orthostatic VS  08-21-24 @ 19:33  Lying BP: --/-- HR: --  Sitting BP: 126/79 HR: 76  Standing BP: 115/70 HR: 72  Site: --  Mode: --    Lipid Panel:  BMI: BMI (kg/m2): 22.9 (08-10-24 @ 09:37)  HbA1c:   Glucose:   BP: --Vital Signs Last 24 Hrs  T(C): 36.6 (08-26-24 @ 08:36), Max: 36.7 (08-25-24 @ 19:27)  T(F): 97.9 (08-26-24 @ 08:36), Max: 98 (08-25-24 @ 19:27)  HR: --  BP: --  BP(mean): --  RR: 16 (08-26-24 @ 08:36) (16 - 18)  SpO2: --    Orthostatic VS  08-26-24 @ 08:36  Lying BP: --/-- HR: --  Sitting BP: 92/53 HR: 72  Standing BP: 108/67 HR: 79  Site: --  Mode: --  Orthostatic VS  08-25-24 @ 19:27  Lying BP: --/-- HR: --  Sitting BP: 111/68 HR: 80  Standing BP: 105/72 HR: 83  Site: --  Mode: --  Orthostatic VS  08-25-24 @ 08:43  Lying BP: --/-- HR: --  Sitting BP: 101/59 HR: 79  Standing BP: --/-- HR: --  Site: upper left arm  Mode: electronic  Orthostatic VS  08-24-24 @ 19:08  Lying BP: --/-- HR: --  Sitting BP: 136/83 HR: 65  Standing BP: 120/72 HR: 108  Site: --  Mode: --    Lipid Panel:

## 2024-08-23 NOTE — BH INPATIENT PSYCHIATRY PROGRESS NOTE - NSBHFUPINTERVALHXFT_PSY_A_CORE
Chart and history reviewed and discussed with treatment team. No events reported overnight. Pt seen visible on the unit, reading a book. Discussed restarting invega sustenna HEALY with pt in agreement, plan to receive loading dose this afternoon. Pt accepted for interview for [facility] on Monday 8/26. No behavioral issues noted. Medication compliant. Sleep and appetite maintained. Denies SI/HI/AVH. Continue to monitor for safety.

## 2024-08-23 NOTE — BH INPATIENT PSYCHIATRY PROGRESS NOTE - NSBHASSESSSUMMFT_PSY_ALL_CORE
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 with hx of schizophrenia vs schizoaffective disorder, substance use (daily alcohol and cannabis use), non adherent to meds and psych aftercare, with prior hx of psych admissions including (last [facility] 11/1/20222-12/20/2022), was on AOT (discontinued Jan 2024). Tonight, presented to the ED BIB EMS activated by parents due to erratic behavior; agitated and displaying bizarre behavior at home. On eval, patient is oddly-related, vague, and evasive although superficially cooperative, with tangential thought process and over productive speech. Reports medication nonadherent, use of 1-2 beers daily and cannabis daily. Patient denies all psych symptoms and history, showing poor insight into his illness. Per family patient has been more agitated lately, with worsening sleep and appetite. Today, pt jumped on furniture screaming, also recently kicked a door down. Patient has also been reporting command auditory hallucinations to family (pt denies this). Family states patient is not at baseline and is advocating for admission. Updated patient's mother on disposition and answered questions.    Treatment Plan  1. admitted to unit, legal status 9.27  2. safety  - routine checks as pt denies SIIP/HIIP and is able to contract for safety  - haldol and ativan PO/IM PRN for agitation and anxiety  - patient states he has a gun license, would verify that patient is SAFE acted prior to discharge  3. psychiatric  - stop risperidal  - stop invega 4.5mg for HEALY  	- invega sustenna HEALY 234mg loading dose to be given 8/23  	- invega sustenna HEALY 156mg to be given 8/28 (next dose due 9/27)  - past trials: invega sustenna HEALY (last given Jan 2024), haldol (EPS), risperidal (galactorrhea)  4. medical  -  5. encourage I/G/M therapy  6. dispo  - [facility] referral  - patient states he has a gun license, would verify that patient is SAFE acted prior to discharge

## 2024-08-26 PROCEDURE — 99232 SBSQ HOSP IP/OBS MODERATE 35: CPT

## 2024-08-26 NOTE — BH INPATIENT PSYCHIATRY PROGRESS NOTE - CURRENT MEDICATION
MEDICATIONS  (STANDING):    MEDICATIONS  (PRN):  melatonin. 3 milliGRAM(s) Oral at bedtime PRN Insomnia  OLANZapine 5 milliGRAM(s) Oral every 8 hours PRN agitation  OLANZapine Injectable 5 milliGRAM(s) IntraMuscular Once PRN agitation   MEDICATIONS  (STANDING):  paliperidone Injectable, Long Acting 156 milliGRAM(s) IntraMuscular once    MEDICATIONS  (PRN):  melatonin. 3 milliGRAM(s) Oral at bedtime PRN Insomnia  OLANZapine 5 milliGRAM(s) Oral every 8 hours PRN agitation  OLANZapine Injectable 5 milliGRAM(s) IntraMuscular Once PRN agitation

## 2024-08-26 NOTE — BH INPATIENT PSYCHIATRY PROGRESS NOTE - NSBHMETABOLIC_PSY_ALL_CORE_FT
BMI: BMI (kg/m2): 22.9 (08-10-24 @ 09:37)  HbA1c:   Glucose:   BP: --Vital Signs Last 24 Hrs  T(C): 36.6 (08-26-24 @ 08:36), Max: 36.7 (08-25-24 @ 19:27)  T(F): 97.9 (08-26-24 @ 08:36), Max: 98 (08-25-24 @ 19:27)  HR: --  BP: --  BP(mean): --  RR: 16 (08-26-24 @ 08:36) (16 - 18)  SpO2: --    Orthostatic VS  08-26-24 @ 08:36  Lying BP: --/-- HR: --  Sitting BP: 92/53 HR: 72  Standing BP: 108/67 HR: 79  Site: --  Mode: --  Orthostatic VS  08-25-24 @ 19:27  Lying BP: --/-- HR: --  Sitting BP: 111/68 HR: 80  Standing BP: 105/72 HR: 83  Site: --  Mode: --  Orthostatic VS  08-25-24 @ 08:43  Lying BP: --/-- HR: --  Sitting BP: 101/59 HR: 79  Standing BP: --/-- HR: --  Site: upper left arm  Mode: electronic  Orthostatic VS  08-24-24 @ 19:08  Lying BP: --/-- HR: --  Sitting BP: 136/83 HR: 65  Standing BP: 120/72 HR: 108  Site: --  Mode: --    Lipid Panel:  BMI: BMI (kg/m2): 22.9 (08-10-24 @ 09:37)  HbA1c:   Glucose:   BP: --Vital Signs Last 24 Hrs  T(C): 36.7 (08-27-24 @ 19:07), Max: 36.7 (08-27-24 @ 19:07)  T(F): 98.1 (08-27-24 @ 19:07), Max: 98.1 (08-27-24 @ 19:07)  HR: --  BP: --  BP(mean): --  RR: --  SpO2: --    Orthostatic VS  08-27-24 @ 19:07  Lying BP: --/-- HR: --  Sitting BP: 129/87 HR: 85  Standing BP: 121/81 HR: 86  Site: --  Mode: --  Orthostatic VS  08-26-24 @ 19:15  Lying BP: --/-- HR: --  Sitting BP: 141/77 HR: 91  Standing BP: 122/90 HR: 96  Site: --  Mode: --    Lipid Panel:

## 2024-08-26 NOTE — BH INPATIENT PSYCHIATRY PROGRESS NOTE - NSBHASSESSSUMMFT_PSY_ALL_CORE
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 with hx of schizophrenia vs schizoaffective disorder, substance use (daily alcohol and cannabis use), non adherent to meds and psych aftercare, with prior hx of psych admissions including (last [facility] L[facility] 11/1/20222-12/20/2022), was on AOT (discontinued Jan 2024). Tonight, presented to the ED BIB EMS activated by parents due to erratic behavior; agitated and displaying bizarre behavior at home. On eval, patient is oddly-related, vague, and evasive although superficially cooperative, with tangential thought process and over productive speech. Reports medication nonadherent, use of 1-2 beers daily and cannabis daily. Patient denies all psych symptoms and history, showing poor insight into his illness. Per family patient has been more agitated lately, with worsening sleep and appetite. Today, pt jumped on furniture screaming, also recently kicked a door down. Patient has also been reporting command auditory hallucinations to family (pt denies this). Family states patient is not at baseline and is advocating for admission. Updated patient's mother on disposition and answered questions.    Treatment Plan  1. admitted to unit, legal status 9.27  2. safety  - routine checks as pt denies SIIP/HIIP and is able to contract for safety  - haldol and ativan PO/IM PRN for agitation and anxiety  - patient states he has a gun license, would verify that patient is SAFE acted prior to discharge  3. psychiatric  - stop risperidal  - stop invega 4.5mg for HEALY  	- invega sustenna HEALY 234mg loading dose to be given 8/23  	- invega sustenna HEALY 156mg to be given 8/28 (next dose due 9/27)  - past trials: invega sustenna HEALY (last given Jan 2024), haldol (EPS), risperidal (galactorrhea)  4. medical  -  5. encourage I/G/M therapy  6. dispo  - [facility] acceptance

## 2024-08-26 NOTE — BH INPATIENT PSYCHIATRY PROGRESS NOTE - NSBHFUPINTERVALHXFT_PSY_A_CORE
Chart and history reviewed and discussed with treatment team. No events reported overnight. Seen with SW. Pt in agreement with continuation towards HEALY, next dose due this Wednesday. Pt accepted to [facility] after interview with plan for discharge this Thursday. No behavioral issues noted. Medication compliant. Sleep and appetite maintained. Denies SI/HI/AVH. Continue to monitor for safety.

## 2024-08-27 PROCEDURE — 90853 GROUP PSYCHOTHERAPY: CPT

## 2024-08-27 PROCEDURE — 99232 SBSQ HOSP IP/OBS MODERATE 35: CPT

## 2024-08-27 NOTE — BH PSYCHOLOGY - GROUP THERAPY NOTE - TOKEN PULL-DIAGNOSIS
Primary Diagnosis:  Schizophrenia, unspecified [F20.9]        Problem Dx:   Schizophrenia, unspecified type [F20.9]

## 2024-08-27 NOTE — BH PSYCHOLOGY - GROUP THERAPY NOTE - NSPSYCHOLGRPCOGINT_PSY_A_CORE FT
Cognitive/behavioral therapy, Acceptance and Commitment therapy, Emotion regulation/coping skills taught, Psychoed

## 2024-08-27 NOTE — BH INPATIENT PSYCHIATRY PROGRESS NOTE - NSBHMETABOLIC_PSY_ALL_CORE_FT
BMI: BMI (kg/m2): 22.9 (08-10-24 @ 09:37)  HbA1c:   Glucose:   BP: --Vital Signs Last 24 Hrs  T(C): 36.4 (08-26-24 @ 19:15), Max: 36.4 (08-26-24 @ 19:15)  T(F): 97.6 (08-26-24 @ 19:15), Max: 97.6 (08-26-24 @ 19:15)  HR: --  BP: --  BP(mean): --  RR: 17 (08-27-24 @ 08:36) (16 - 17)  SpO2: --    Orthostatic VS  08-26-24 @ 19:15  Lying BP: --/-- HR: --  Sitting BP: 141/77 HR: 91  Standing BP: 122/90 HR: 96  Site: --  Mode: --  Orthostatic VS  08-26-24 @ 08:36  Lying BP: --/-- HR: --  Sitting BP: 92/53 HR: 72  Standing BP: 108/67 HR: 79  Site: --  Mode: --  Orthostatic VS  08-25-24 @ 19:27  Lying BP: --/-- HR: --  Sitting BP: 111/68 HR: 80  Standing BP: 105/72 HR: 83  Site: --  Mode: --    Lipid Panel:  BMI: BMI (kg/m2): 22.9 (08-10-24 @ 09:37)  HbA1c:   Glucose:   BP: --Vital Signs Last 24 Hrs  T(C): 36.7 (08-27-24 @ 19:07), Max: 36.7 (08-27-24 @ 19:07)  T(F): 98.1 (08-27-24 @ 19:07), Max: 98.1 (08-27-24 @ 19:07)  HR: --  BP: --  BP(mean): --  RR: --  SpO2: --    Orthostatic VS  08-27-24 @ 19:07  Lying BP: --/-- HR: --  Sitting BP: 129/87 HR: 85  Standing BP: 121/81 HR: 86  Site: --  Mode: --  Orthostatic VS  08-26-24 @ 19:15  Lying BP: --/-- HR: --  Sitting BP: 141/77 HR: 91  Standing BP: 122/90 HR: 96  Site: --  Mode: --    Lipid Panel:

## 2024-08-27 NOTE — BH INPATIENT PSYCHIATRY PROGRESS NOTE - NSBHCHARTREVIEWVS_PSY_A_CORE FT
Vital Signs Last 24 Hrs  T(C): 36.4 (08-26-24 @ 19:15), Max: 36.4 (08-26-24 @ 19:15)  T(F): 97.6 (08-26-24 @ 19:15), Max: 97.6 (08-26-24 @ 19:15)  HR: --  BP: --  BP(mean): --  RR: 17 (08-27-24 @ 08:36) (16 - 17)  SpO2: --    Orthostatic VS  08-26-24 @ 19:15  Lying BP: --/-- HR: --  Sitting BP: 141/77 HR: 91  Standing BP: 122/90 HR: 96  Site: --  Mode: --  Orthostatic VS  08-26-24 @ 08:36  Lying BP: --/-- HR: --  Sitting BP: 92/53 HR: 72  Standing BP: 108/67 HR: 79  Site: --  Mode: --  Orthostatic VS  08-25-24 @ 19:27  Lying BP: --/-- HR: --  Sitting BP: 111/68 HR: 80  Standing BP: 105/72 HR: 83  Site: --  Mode: --   Vital Signs Last 24 Hrs  T(C): 36.7 (08-27-24 @ 19:07), Max: 36.7 (08-27-24 @ 19:07)  T(F): 98.1 (08-27-24 @ 19:07), Max: 98.1 (08-27-24 @ 19:07)  HR: --  BP: --  BP(mean): --  RR: --  SpO2: --    Orthostatic VS  08-27-24 @ 19:07  Lying BP: --/-- HR: --  Sitting BP: 129/87 HR: 85  Standing BP: 121/81 HR: 86  Site: --  Mode: --  Orthostatic VS  08-26-24 @ 19:15  Lying BP: --/-- HR: --  Sitting BP: 141/77 HR: 91  Standing BP: 122/90 HR: 96  Site: --  Mode: --

## 2024-08-27 NOTE — BH PSYCHOLOGY - GROUP THERAPY NOTE - NSPSYCHOLGRPCOGPT_PSY_A_CORE FT
Patient attended Cognitive Behavioral Therapy Group incorporating ACT-based concepts. The group started with a brief check-in asking Pts to share who their role model is. Members then engaged in a positive affirmations mindfulness meditation that focused on improving self-esteem. Lastly, group focused on engaging in a discussion about common unhelpful thinking patterns humans tend to fall into. Group members took turns reading out loud from a worksheet and gave examples of ways they may fall into each thinking pitfall. The goal of the worksheet was to increase awareness of one’s own thoughts without placing judgement on them. Group facilitator(s) explained concepts, reinforced participation, and engaged patients in the discussion.

## 2024-08-27 NOTE — BH PSYCHOLOGY - GROUP THERAPY NOTE - NSBHPSYCHOLRESPCOMMENT_PSY_A_CORE FT
The patient appeared adequately groomed and casually dressed. Pt appeared somewhat engaged in the group as evidenced by his willingness to participate with prompting during the discussion. Pt joined shortly after group check-in. Pt followed along the unhelpful thinking habits worksheet and read aloud when prompted to do so. Speech WNL. PT was oriented X3. Pt was appropriate and supportive with peers.

## 2024-08-27 NOTE — BH INPATIENT PSYCHIATRY PROGRESS NOTE - NSBHFUPINTERVALHXFT_PSY_A_CORE
Chart and history reviewed and discussed with treatment team. No events reported overnight. Seen with SW. Pt in agreement with continuation towards HEALY, next dose due this Wednesday. Continued encouragement towards groups and fresh air break in anticipation for discharge. No behavioral issues noted. Medication compliant. Sleep and appetite maintained. Denies SI/HI/AVH. Continue to monitor for safety.

## 2024-08-27 NOTE — BH INPATIENT PSYCHIATRY PROGRESS NOTE - NSBHASSESSSUMMFT_PSY_ALL_CORE
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 with hx of schizophrenia vs schizoaffective disorder, substance use (daily alcohol and cannabis use), non adherent to meds and psych aftercare, with prior hx of psych admissions including (last [facility] 11/1/20222-12/20/2022), was on AOT (discontinued Jan 2024). Tonight, presented to the ED BIB EMS activated by parents due to erratic behavior; agitated and displaying bizarre behavior at home. On eval, patient is oddly-related, vague, and evasive although superficially cooperative, with tangential thought process and over productive speech. Reports medication nonadherent, use of 1-2 beers daily and cannabis daily. Patient denies all psych symptoms and history, showing poor insight into his illness. Per family patient has been more agitated lately, with worsening sleep and appetite. Today, pt jumped on furniture screaming, also recently kicked a door down. Patient has also been reporting command auditory hallucinations to family (pt denies this). Family states patient is not at baseline and is advocating for admission. Updated patient's mother on disposition and answered questions.    Treatment Plan  1. admitted to unit, legal status 9.27  2. safety  - routine checks as pt denies SIIP/HIIP and is able to contract for safety  - haldol and ativan PO/IM PRN for agitation and anxiety  3. psychiatric  - stop risperidal  - stop invega 4.5mg for HEALY  	- invega sustenna HEALY 234mg loading dose to be given 8/23  	- invega sustenna HEALY 156mg to be given 8/28 (next dose due 9/27)  - past trials: invega sustenna HEALY (last given Jan 2024), haldol (EPS), risperidal (galactorrhea)  4. medical  -  5. encourage I/G/M therapy  6. dispo  - [facility] acceptance

## 2024-08-28 PROCEDURE — 99232 SBSQ HOSP IP/OBS MODERATE 35: CPT

## 2024-08-28 RX ORDER — HYDROXYZINE HCL 25 MG
1 TABLET ORAL
Qty: 120 | Refills: 0
Start: 2024-08-28 | End: 2024-09-26

## 2024-08-28 RX ADMIN — PALIPERIDONE 156 MILLIGRAM(S): 3 TABLET, EXTENDED RELEASE ORAL at 13:35

## 2024-08-28 NOTE — BH INPATIENT PSYCHIATRY DISCHARGE NOTE - REASON FOR ADMISSION
you were admitted to [facility] for erratic behavior; agitated and displaying bizarre behavior at home in the context of substance use

## 2024-08-28 NOTE — BH INPATIENT PSYCHIATRY PROGRESS NOTE - NSBHATTESTTYPEVISIT_PSY_A_CORE
On-site Attending supervising PATSY (99XXX codes)
PATSY without on-site Attending supervision
PATSY without on-site Attending supervision
On-site Attending supervising PATSY (99XXX codes)

## 2024-08-28 NOTE — BH DISCHARGE NOTE NURSING/SOCIAL WORK/PSYCH REHAB - NSCDUDCCRISIS_PSY_A_CORE
[facility] Well  1 [phone]) [facility]-WELL ([phone])  Text "WELL" to 08339  Website: www.[employer]/.[employer] 1 [phone] 621-HOPE (7322) Website: www.[facility].org/.  [facility] - [facility] – Crisis Care for Children, Adults and Families  [address] [place] [address]  Mobile Crisis Hotline – (813) 659-1092/.National Suicide Prevention Lifeline 5 (640) 881-3399/.  [facility]  1 (574) LIFENET (779-9237)/.  [facility]  (911) 832-4589/.  [facility] Behavioral Health Helpline / [facility] Mobile Crisis  (205) 611-QPKE (7171)/.  [facility] Response Crisis Hotline  (708) 377-8968  24 hour telephone crisis intervention and suicide prevention hotline concerned with all mental health issues/.  [facility]  [phone] [place] [phone]   Hours:  Monday through Friday from 9 AM to 3 PM/988 Suicide and Crisis Lifeline

## 2024-08-28 NOTE — BH INPATIENT PSYCHIATRY PROGRESS NOTE - NSTXIMPULSGOAL_PSY_ALL_CORE
Will be able to demonstrate the ability to pause before acting out negatively

## 2024-08-28 NOTE — BH INPATIENT PSYCHIATRY PROGRESS NOTE - NSBHMSETHTPROC_PSY_A_CORE
Perseverative/Impaired reasoning

## 2024-08-28 NOTE — BH INPATIENT PSYCHIATRY DISCHARGE NOTE - DISCHARGE SERVICE FOR PATIENT
Post-Care Instructions: Patient instructed to not lie down for 4 hours and limit physical activity for 24 hours.
Right Pupillary Line Units: 0
Additional Area 1 Units: 18
Show Right And Left Periorbital Units: No
Show Anterior Platysmal Band Units: Yes
Depressor Anguli Oris Units: 12
Additional Area 2 Location: obicularis oris
Detail Level: Detailed
Lot #: [id number]
Periorbital Skin Units: 72
Additional Area 3 Location: nasalis
Dilution (U/0.1 Cc): 2.5
Additional Area 4 Location: masseters
Expiration Date (Month Year): 9/25
Price (Use Numbers Only, No Special Characters Or $): 306
Glabellar Complex Units: 48
Additional Area 1 Location: [facility]
Consent: Written consent obtained. Risks include but not limited to lid/brow ptosis, bruising, swelling, diplopia, temporary effect, incomplete chemical denervation.
on the discharge service for the patient. I have reviewed and made amendments to the documentation where necessary.

## 2024-08-28 NOTE — BH INPATIENT PSYCHIATRY DISCHARGE NOTE - NSDCMRMEDTOKEN_GEN_ALL_CORE_FT
hydrOXYzine hydrochloride 50 mg oral tablet: 1 tab(s) orally every 6 hours as needed for  anxiety  melatonin 3 mg oral tablet: 1 tab(s) orally once a day (at bedtime) as needed for Insomnia  paliperidone 156 mg/mL intramuscular suspension, extended release: 156 milligram(s) intramuscular every 4 weeks loaded 234 on 8/23/24, 156 on 8/28/24 and first maintenance dose due around 9/25/24

## 2024-08-28 NOTE — BH INPATIENT PSYCHIATRY PROGRESS NOTE - NSBHCONSBHPROVCNTCTNOFT_PSY_A_CORE
defer to primary team

## 2024-08-28 NOTE — BH INPATIENT PSYCHIATRY PROGRESS NOTE - NSDCCRITERIA_PSY_ALL_CORE
symptom management, safety planning, care coordination 

## 2024-08-28 NOTE — BH INPATIENT PSYCHIATRY DISCHARGE NOTE - NSBHFUPINTERVALHXFT_PSY_A_CORE
Discharge Progress Note Date and Time: 08-29-24 @ 14:52    Pt has continued to show improvement in symptoms. Pt states that depression is much improved, denying anxiety. On day of discharge, pt denies SI/HI/AVH and behavior has been well controlled. Pt reports that sleep and appetite have returned to baseline. Pt has been fully engaged in treatment on the unit, compliant with medications, and is in agreement to continue care in the outpatient setting. Pt is supported by family, who are protective factors. Pt is able to safety plan appropriately. Pt's risk cannot be further reduced with continued inpatient hospitalization. Pt is no longer an acute danger to self or others, and is stable for discharge home.

## 2024-08-28 NOTE — BH INPATIENT PSYCHIATRY PROGRESS NOTE - NSBHATTESTBILLONSITE_PSY_A_CORE
PATSY to bill
Attending to bill
PATSY to bill
PATSY to bill
Attending to bill
Attending to bill
PATSY to bill
Attending to bill
Attending to bill

## 2024-08-28 NOTE — BH INPATIENT PSYCHIATRY PROGRESS NOTE - NSBHMETABOLIC_PSY_ALL_CORE_FT
BMI: BMI (kg/m2): 22.9 (08-10-24 @ 09:37)  HbA1c:   Glucose:   BP: --Vital Signs Last 24 Hrs  T(C): 36.7 (08-27-24 @ 19:07), Max: 36.7 (08-27-24 @ 19:07)  T(F): 98.1 (08-27-24 @ 19:07), Max: 98.1 (08-27-24 @ 19:07)  HR: --  BP: --  BP(mean): --  RR: --  SpO2: --    Orthostatic VS  08-27-24 @ 19:07  Lying BP: --/-- HR: --  Sitting BP: 129/87 HR: 85  Standing BP: 121/81 HR: 86  Site: --  Mode: --  Orthostatic VS  08-26-24 @ 19:15  Lying BP: --/-- HR: --  Sitting BP: 141/77 HR: 91  Standing BP: 122/90 HR: 96  Site: --  Mode: --    Lipid Panel:  BMI: BMI (kg/m2): 22.9 (08-10-24 @ 09:37)  HbA1c:   Glucose:   BP: --Vital Signs Last 24 Hrs  T(C): 36.5 (08-29-24 @ 08:15), Max: 36.7 (08-28-24 @ 19:32)  T(F): 97.7 (08-29-24 @ 08:15), Max: 98 (08-28-24 @ 19:32)  HR: --  BP: --  BP(mean): --  RR: 18 (08-28-24 @ 20:55) (18 - 18)  SpO2: --    Orthostatic VS  08-28-24 @ 19:32  Lying BP: --/-- HR: --  Sitting BP: 128/83 HR: 75  Standing BP: 130/78 HR: 80  Site: --  Mode: --  Orthostatic VS  08-27-24 @ 19:07  Lying BP: --/-- HR: --  Sitting BP: 129/87 HR: 85  Standing BP: 121/81 HR: 86  Site: --  Mode: --    Lipid Panel:

## 2024-08-28 NOTE — BH INPATIENT PSYCHIATRY PROGRESS NOTE - NSBHMSEAFFQUAL_PSY_A_CORE
Elevated/Irritable
Euthymic
Elevated/Irritable
Euthymic
Elevated/Irritable
Elevated/Irritable
Euthymic
Elevated/Irritable
Euthymic

## 2024-08-28 NOTE — BH DISCHARGE NOTE NURSING/SOCIAL WORK/PSYCH REHAB - DISCHARGE INSTRUCTIONS AFTERCARE APPOINTMENTS
In order to check the location, date, or time of your aftercare appointment, please refer to your Discharge Instructions Document given to you upon leaving the hospital.  If you have lost the instructions please call [phone]
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
## 2024-08-28 NOTE — BH INPATIENT PSYCHIATRY PROGRESS NOTE - NSTXDCOTHRDATEEST_PSY_ALL_CORE
Nov 8/5/20    Rx filled per protocol  
REFILL FUROSEMIDE #90 DAYS TO [facility]  
31-Jan-2024
19-Aug-2024
12-Aug-2024
19-Aug-2024
12-Aug-2024
19-Aug-2024
12-Aug-2024
19-Aug-2024
31-Jan-2024
12-Aug-2024
19-Aug-2024
12-Aug-2024
12-Aug-2024

## 2024-08-28 NOTE — BH INPATIENT PSYCHIATRY PROGRESS NOTE - OTHER
neutral
guarded and irritable but more cooperative
neutral
guarded and irritable but more cooperative
neutral
guarded and irritable but more cooperative
neutral
guarded and irritable but more cooperative
neutral
neutral

## 2024-08-28 NOTE — BH INPATIENT PSYCHIATRY PROGRESS NOTE - CURRENT MEDICATION
MEDICATIONS  (STANDING):  paliperidone Injectable, Long Acting 156 milliGRAM(s) IntraMuscular once    MEDICATIONS  (PRN):  melatonin. 3 milliGRAM(s) Oral at bedtime PRN Insomnia  OLANZapine 5 milliGRAM(s) Oral every 8 hours PRN agitation  OLANZapine Injectable 5 milliGRAM(s) IntraMuscular Once PRN agitation   MEDICATIONS  (STANDING):    MEDICATIONS  (PRN):  melatonin. 3 milliGRAM(s) Oral at bedtime PRN Insomnia  OLANZapine 5 milliGRAM(s) Oral every 8 hours PRN agitation  OLANZapine Injectable 5 milliGRAM(s) IntraMuscular Once PRN agitation
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
## 2024-08-28 NOTE — BH INPATIENT PSYCHIATRY PROGRESS NOTE - NSBHFUPINTERVALCCFT_PSY_A_CORE
Patient seen for follow up for psychosis.

## 2024-08-28 NOTE — BH DISCHARGE NOTE NURSING/SOCIAL WORK/PSYCH REHAB - PATIENT PORTAL LINK FT
You can access the FollowMyHealth Patient Portal offered by [facility] by registering at the following website: [email or web address]. By joining [facility]’s FollowMyHealth portal, you will also be able to view your health information using other applications (apps) compatible with our system.

## 2024-08-28 NOTE — BH INPATIENT PSYCHIATRY PROGRESS NOTE - NSBHMSEIMPULSE_PSY_A_CORE
Normal
Normal
Impaired
Normal
Normal
Impaired
Normal
Impaired
Normal

## 2024-08-28 NOTE — BH INPATIENT PSYCHIATRY PROGRESS NOTE - NSICDXBHPRIMARYDX_PSY_ALL_CORE
Schizophrenia, unspecified   F20.9  

## 2024-08-28 NOTE — BH INPATIENT PSYCHIATRY PROGRESS NOTE - NSTXIMPULSDATEEST_PSY_ALL_CORE
11-Aug-2024

## 2024-08-28 NOTE — BH DISCHARGE NOTE NURSING/SOCIAL WORK/PSYCH REHAB - NSDCPRGOAL_PSY_ALL_CORE
Pt made progress towards his discharge. Pt has demonstrated good behavioral control since his admission. Pt has identified his coping skills such as breathing, think before act, walk away to manage symptom. Pt has demonstrated compliant with medication. Pt has received HEALY prior to his discharge. Pt stated calmer as benefit of medication compliance. Pt has verbally agreed to comply with outpatient treatment and medication. During this hospitalization, pt showed 90% of group attendance. During the group session, pt was able to tolerate group structure, somewhat attentive, and participated relevantly. Pt was visible on the unit, interacts with others. PT maintained fair ADLs. Pt has participated in his safety plan.

## 2024-08-28 NOTE — BH INPATIENT PSYCHIATRY PROGRESS NOTE - NSBHCHARTREVIEWVS_PSY_A_CORE FT
Vital Signs Last 24 Hrs  T(C): 36.7 (08-27-24 @ 19:07), Max: 36.7 (08-27-24 @ 19:07)  T(F): 98.1 (08-27-24 @ 19:07), Max: 98.1 (08-27-24 @ 19:07)  HR: --  BP: --  BP(mean): --  RR: --  SpO2: --    Orthostatic VS  08-27-24 @ 19:07  Lying BP: --/-- HR: --  Sitting BP: 129/87 HR: 85  Standing BP: 121/81 HR: 86  Site: --  Mode: --  Orthostatic VS  08-26-24 @ 19:15  Lying BP: --/-- HR: --  Sitting BP: 141/77 HR: 91  Standing BP: 122/90 HR: 96  Site: --  Mode: --   Vital Signs Last 24 Hrs  T(C): 36.5 (08-29-24 @ 08:15), Max: 36.7 (08-28-24 @ 19:32)  T(F): 97.7 (08-29-24 @ 08:15), Max: 98 (08-28-24 @ 19:32)  HR: --  BP: --  BP(mean): --  RR: 18 (08-28-24 @ 20:55) (18 - 18)  SpO2: --    Orthostatic VS  08-28-24 @ 19:32  Lying BP: --/-- HR: --  Sitting BP: 128/83 HR: 75  Standing BP: 130/78 HR: 80  Site: --  Mode: --  Orthostatic VS  08-27-24 @ 19:07  Lying BP: --/-- HR: --  Sitting BP: 129/87 HR: 85  Standing BP: 121/81 HR: 86  Site: --  Mode: --

## 2024-08-28 NOTE — BH INPATIENT PSYCHIATRY PROGRESS NOTE - NSTXIMPULSDATETRGT_PSY_ALL_CORE
18-Aug-2024
18-Aug-2024
25-Aug-2024
28-Aug-2024
25-Aug-2024
28-Aug-2024
18-Aug-2024
25-Aug-2024
28-Aug-2024
18-Aug-2024
25-Aug-2024
18-Aug-2024
18-Aug-2024
25-Aug-2024
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
## 2024-08-28 NOTE — BH INPATIENT PSYCHIATRY PROGRESS NOTE - NSTXIMPULSPROGRES_PSY_ALL_CORE
Met - goal discontinued
Met - goal discontinued
Improving
Met - goal discontinued
Improving
Improving

## 2024-08-28 NOTE — BH INPATIENT PSYCHIATRY DISCHARGE NOTE - NSBHMETABOLIC_PSY_ALL_CORE_FT
BMI: BMI (kg/m2): 22.9 (08-10-24 @ 09:37)  HbA1c:   Glucose:   BP: --Vital Signs Last 24 Hrs  T(C): 36.7 (08-27-24 @ 19:07), Max: 36.7 (08-27-24 @ 19:07)  T(F): 98.1 (08-27-24 @ 19:07), Max: 98.1 (08-27-24 @ 19:07)  HR: --  BP: --  BP(mean): --  RR: --  SpO2: --    Orthostatic VS  08-27-24 @ 19:07  Lying BP: --/-- HR: --  Sitting BP: 129/87 HR: 85  Standing BP: 121/81 HR: 86  Site: --  Mode: --  Orthostatic VS  08-26-24 @ 19:15  Lying BP: --/-- HR: --  Sitting BP: 141/77 HR: 91  Standing BP: 122/90 HR: 96  Site: --  Mode: --    Lipid Panel:

## 2024-08-28 NOTE — BH INPATIENT PSYCHIATRY DISCHARGE NOTE - HOSPITAL COURSE
Pt was restarted on invega PO with plan to convert to invega sustenna HEALY, but only the "lowest dose." On medication, pt's presentation began to improve, becoming more visible on the unit, less guarded on approach, improved eye contact, and able to sustain appropriate conversation. Pt was eventually titrated up to 4.5mg PO with good effect, and sleep was noted to improve with melatonin 3mg. Pt encouraged for continued invega titration, though declined. Discussions were held with pt's family regarding outpatient follow up, who voiced concerns of pt's ability to maintain compliance upon discharge, requesting ACT/AOT again. Family made aware that referrals could not be made at this time due to pt's lack of rehospitalization. Pt was in agreement for substance abuse counseling, and accepted to [facility]. Invega sustenna HEALY was 234mg was given on 8/23, with 156mg given prior to discharge on 8/28.    Treatment Plan  1. admitted to unit, legal status 9.27  2. safety  - routine checks as pt denies SIIP/HIIP and is able to contract for safety  - haldol and ativan PO/IM PRN for agitation and anxiety  3. psychiatric  - stop risperidal  - stop invega 4.5mg for HEALY  	- invega sustenna HEALY 234mg loading dose to be given 8/23  	- invega sustenna HEALY 156mg to be given 8/28 (next dose due 9/27)  - past trials: invega sustenna HEALY (last given Jan 2024), haldol (EPS), risperidal (galactorrhea)  4. medical  - denies  5. encourage I/G/M therapy  6. dispo  - [facility] acceptance

## 2024-08-28 NOTE — BH INPATIENT PSYCHIATRY PROGRESS NOTE - NSBHATTESTBILLING_PSY_A_CORE
01689-Lymgiyhgxe OBS or IP - moderate complexity OR 35-49 mins
99337-Xezyigcyqo OBS or IP - moderate complexity OR 35-49 mins
[id number]-Mmdqqhicra OBS or IP - moderate complexity OR 35-49 mins
55852-Dwbkkssfzp OBS or IP - moderate complexity OR 35-49 mins
85935-Iyoajpeqro OBS or IP - moderate complexity OR 35-49 mins
22294-Hnhzwkltmi OBS or IP - moderate complexity OR 35-49 mins
35195-Sgcgwpbndl OBS or IP - moderate complexity OR 35-49 mins
09060-Unuhanrsoy OBS or IP - moderate complexity OR 35-49 mins
11958-Usvzkhtbjh OBS or IP - moderate complexity OR 35-49 mins
19821-Knxljnnzzh OBS or IP - moderate complexity OR 35-49 mins
74034-Dbcoaugrsf OBS or IP - moderate complexity OR 35-49 mins
37407-Gmkygtasml OBS or IP - moderate complexity OR 35-49 mins
01604-Kpmzbfsnrh OBS or IP - moderate complexity OR 35-49 mins
[id number]-Uiiddpybxm OBS or IP - moderate complexity OR 35-49 mins

## 2024-08-28 NOTE — BH INPATIENT PSYCHIATRY PROGRESS NOTE - ATTENDING COMMENTS
Chart was reviewed and case discussed with the Psych NP. I agree with the assessment and plan as documented in the Psych NP's progress note and was directly involved in medical decision making.  

## 2024-08-28 NOTE — BH INPATIENT PSYCHIATRY DISCHARGE NOTE - NSBHASSESSSUMMFT_PSY_ALL_CORE
On day of discharge, pt encouraged to continue medication regiment. Pt educated on importance of monitoring for worsening symptoms, and to call 911 or go to the nearest emergency department if not feeling safe in the community. Pt provided with numbers to Suicide Prevention and [facility] and educated on their use. Pt voiced understanding to education. Pt was provided with one month supply of medication and a follow up appointment with outpatient psychiatric services. Pt reported feeling safe for discharge and to continue treatment with [facility].     Pt is at a chronic risk to self injury due to, but not limited by: serious mental illness, hx of substance use. Protective factors include: supportive family, substance abuse treatment, HEALY.

## 2024-08-28 NOTE — BH INPATIENT PSYCHIATRY PROGRESS NOTE - NSBHMSEMOOD_PSY_A_CORE
Irritable
Irritable
Other
Irritable
Other

## 2024-08-28 NOTE — BH INPATIENT PSYCHIATRY PROGRESS NOTE - PRN MEDS
MEDICATIONS  (PRN):  melatonin. 3 milliGRAM(s) Oral at bedtime PRN Insomnia  OLANZapine 5 milliGRAM(s) Oral every 8 hours PRN agitation  OLANZapine Injectable 5 milliGRAM(s) IntraMuscular Once PRN agitation  
MEDICATIONS  (PRN):  OLANZapine 5 milliGRAM(s) Oral every 8 hours PRN agitation  OLANZapine Injectable 5 milliGRAM(s) IntraMuscular Once PRN agitation  
MEDICATIONS  (PRN):  melatonin. 3 milliGRAM(s) Oral at bedtime PRN Insomnia  OLANZapine 5 milliGRAM(s) Oral every 8 hours PRN agitation  OLANZapine Injectable 5 milliGRAM(s) IntraMuscular Once PRN agitation  
MEDICATIONS  (PRN):  OLANZapine 5 milliGRAM(s) Oral every 8 hours PRN agitation  OLANZapine Injectable 5 milliGRAM(s) IntraMuscular Once PRN agitation  
MEDICATIONS  (PRN):  melatonin. 3 milliGRAM(s) Oral at bedtime PRN Insomnia  OLANZapine 5 milliGRAM(s) Oral every 8 hours PRN agitation  OLANZapine Injectable 5 milliGRAM(s) IntraMuscular Once PRN agitation  
MEDICATIONS  (PRN):  melatonin. 3 milliGRAM(s) Oral at bedtime PRN Insomnia  OLANZapine 5 milliGRAM(s) Oral every 8 hours PRN agitation  OLANZapine Injectable 5 milliGRAM(s) IntraMuscular Once PRN agitation  
MEDICATIONS  (PRN):  OLANZapine 5 milliGRAM(s) Oral every 8 hours PRN agitation  OLANZapine Injectable 5 milliGRAM(s) IntraMuscular Once PRN agitation  
MEDICATIONS  (PRN):  melatonin. 3 milliGRAM(s) Oral at bedtime PRN Insomnia  OLANZapine 5 milliGRAM(s) Oral every 8 hours PRN agitation  OLANZapine Injectable 5 milliGRAM(s) IntraMuscular Once PRN agitation  

## 2024-08-28 NOTE — BH INPATIENT PSYCHIATRY PROGRESS NOTE - NSBHMSEBEHAV_PSY_A_CORE
Cooperative/Other
Cooperative
Cooperative
Cooperative/Other
Cooperative
Cooperative/Other
Cooperative
Cooperative/Other
Cooperative
Cooperative

## 2024-08-28 NOTE — BH INPATIENT PSYCHIATRY PROGRESS NOTE - NSBHFUPINTERVALHXFT_PSY_A_CORE
Chart and history reviewed and discussed with treatment team. No events reported overnight. pt seen visible on the unit, in agreement to continue to receive invega sustenna, second dose due today. Pt reports no issues with injection other than injection cite pain. Discussed upcoming discharge, with pt stating that he feels safe for discharge. No behavioral issues noted. Medication compliant. Sleep and appetite maintained. Denies SI/HI/AVH. Continue to monitor for safety.  Chart and history reviewed and discussed with treatment team. No events reported overnight. pt seen visible on the unit, in agreement to continue to receive invega sustenna, second dose due today. Pt reports no issues with injection other than injection cite pain. Discussed upcoming discharge, with pt stating that he feels safe for discharge. No behavioral issues noted. Medication compliant. Sleep and appetite maintained. Denies SI/HI/AVH. Continue to monitor for safety.     NP and SW spoke with pt's mother. Mom made aware of discharge plan.

## 2024-08-28 NOTE — BH INPATIENT PSYCHIATRY DISCHARGE NOTE - ATTENDING DISCHARGE PHYSICAL EXAMINATION:
I have personally seen and evaluated patient prior to discharge. Chart reviewed and discharge plan discussed with the NP as follows. Pt prior to discharge was calm, cooperative, friendly and smiling. Patient has been attending groups with active participation.  Pt interacting well with others. No recent behavioral problems and no episodes of aggressive or dangerous behavior. No problems with sleep, appetite or energy level. Denied any active and current manic, hypomanic, depressive, psychotic or anxiety symptoms. Denied any current SI/HI/AH/VH/PI. No overt delusions.  Patient is more organized and commits to safety and to ongoing outpatient treatment.   Pt asks relevant questions about his discharge plan and about the medication regimen. Pt articulate a plan for living life after discharge. Medication risks/benefits/side effects were discussed with the patient.  Patient expressed understanding and agreed with the treatment plan. Further, instructed the patient to seek help immediately by dialing "911" or by going to the nearest ER if symptoms worsen.   Chronic risk factors include hx of schizophrenia, hx of noncompliance, erratic behaviors in setting of psychosis   Protective factors include domiciled, employed, higher education, supportive friends/family network, accepting of HEALY, compliant with psychiatric medications, motivated to engage in outpateint psychiatric treatment, future oriented thinking, expresses concern for well being, prior high level of functioning, much aspirations for return to career and wish to pursue pleasurable activities.  As such, its chronic risk factors are mitigated by their protective factors. Pt does not pose an acute elevated risk of imminent danger to harm to self or others. Does not require further inpatient psychiatric admission at this time. Psychiatrically cleared for discharge.   Pt urged to avoid using any alcohol or street drugs, particularly marijuana & K2, cocaine and methamphetamine (crystal meth) once discharged.  Safety plan filled out by patient and submitted into chart.

## 2024-08-28 NOTE — BH INPATIENT PSYCHIATRY PROGRESS NOTE - NSBHCONSULTIPREASON_PSY_A_CORE
danger to others; mental illness expected to respond to inpatient care

## 2024-08-28 NOTE — BH INPATIENT PSYCHIATRY PROGRESS NOTE - NSTXDCOTHRDATETRGT_PSY_ALL_CORE
02-Sep-2024
26-Aug-2024
02-Sep-2024
19-Aug-2024
26-Aug-2024
19-Aug-2024
19-Aug-2024
26-Aug-2024
19-Aug-2024
26-Aug-2024
19-Aug-2024
19-Aug-2024
26-Aug-2024

## 2024-08-29 VITALS — TEMPERATURE: 98 F

## 2024-09-04 ENCOUNTER — OUTPATIENT (OUTPATIENT)
Dept: OUTPATIENT SERVICES | Facility: HOSPITAL | Age: 29
LOS: 1 days | Discharge: HOME | End: 2024-09-04

## 2024-09-04 DIAGNOSIS — F20.9 SCHIZOPHRENIA, UNSPECIFIED: ICD-10-CM

## 2024-09-04 DIAGNOSIS — F12.20 CANNABIS DEPENDENCE, UNCOMPLICATED: ICD-10-CM

## 2024-09-04 DIAGNOSIS — F10.20 ALCOHOL DEPENDENCE, UNCOMPLICATED: ICD-10-CM

## 2024-09-04 DIAGNOSIS — F16.20 HALLUCINOGEN DEPENDENCE, UNCOMPLICATED: ICD-10-CM

## 2024-09-18 PROCEDURE — 90792 PSYCH DIAG EVAL W/MED SRVCS: CPT

## 2024-09-18 NOTE — BH DISCHARGE NOTE NURSING/SOCIAL WORK/PSYCH REHAB - NSDCBELONGINGSDISPO_PSY_ALL_CORE
Dr. Myers Returned to patient

## 2024-10-01 ENCOUNTER — TRANSCRIPTION ENCOUNTER (OUTPATIENT)
Age: 29
End: 2024-10-01

## 2024-11-14 NOTE — ED PROVIDER NOTE - NSPSYCHIATRIC_ED_A_ED
Surgery orders entered.  If you could please find out what pharmacy she uses and I will send in Bactroban prescription.  I tried to reach her but this service was bad and call failed.  Hold Ozempic. In-Voluntary (2PC)

## 2024-11-21 NOTE — BH INPATIENT PSYCHIATRY PROGRESS NOTE - NSBHMSETHTASSOC_PSY_A_CORE
Normal
no
Normal

## 2024-12-04 NOTE — BH INPATIENT PSYCHIATRY ASSESSMENT NOTE - PATIENT'S CHIEF COMPLAINT
- Diabetes is AT GOAL, as indicated by the most recent A1C reviewed on 12/4/2024.  - Therapy Goals: Discussed the aim to achieve optimal control without causing hypoglycemia, Targeting an A1C of <7%.  - CGM: Freestyle Latanya data was downloaded, personally interpreted, and reviewed with the patient during this visit. See noted below.  - Diabetic Supplies and Medications: Reviewed to ensure continued refills and compliance.  - Preventive Care: Advised the patient to schedule periodic eye exams and maintain regular foot care monitoring.  - Annual Monitoring: Reviewed the importance of yearly lipid profile (with statin use) and urine protein/creatinine tests.  - hypoglycemia on Freestyle Latanya 3:  Likely sensor issue and not actual hypoglycemic events.  Given patient without any symptoms, I did discuss this with him 12/4/2024    Plan  - Changes:  Continue to benefit from Mounjaro given much better glucose control and weight loss  - INCREASE MOUNJARO TO 7.5 MG once a week injection  - Dietary Modifications: Encouraged portion size control and reduction of carbohydrate intake to better manage diabetes.  - Given good glucose control I advise that he can stop CGM monitoring if too expensive  - Hypoglycemia Management: Discussed symptoms and treatment of hypoglycemia. The patient is informed  - Clear written instructions provided on AVS. Follow-up scheduled within the next 6 months with lab work prior.   - Appointment date and time were provided to the patient today.    "Don't be intoxicated with another man's wife"

## 2024-12-11 NOTE — BH INPATIENT PSYCHIATRY PROGRESS NOTE - NSBHCHARTREVIEWVS_PSY_A_CORE FT
BMP  Lab Results   Component Value Date     12/03/2024    K 4.4 12/03/2024     12/03/2024    CO2 27 12/03/2024    BUN 28 (H) 12/03/2024    CREATININE 1.35 (H) 12/03/2024    CALCIUM 9.8 12/03/2024    ANIONGAP 13 12/03/2024    EGFRNORACEVR 41 (L) 12/03/2024     Stable continue monitoring.  Refuses ACEI/ARB or other treatment.  Has been counseled to avoid nephrotoxic agents including NSAIDs.   Vital Signs Last 24 Hrs  T(C): 36.6 (08-26-24 @ 08:36), Max: 36.7 (08-25-24 @ 19:27)  T(F): 97.9 (08-26-24 @ 08:36), Max: 98 (08-25-24 @ 19:27)  HR: --  BP: --  BP(mean): --  RR: 16 (08-26-24 @ 08:36) (16 - 18)  SpO2: --    Orthostatic VS  08-26-24 @ 08:36  Lying BP: --/-- HR: --  Sitting BP: 92/53 HR: 72  Standing BP: 108/67 HR: 79  Site: --  Mode: --  Orthostatic VS  08-25-24 @ 19:27  Lying BP: --/-- HR: --  Sitting BP: 111/68 HR: 80  Standing BP: 105/72 HR: 83  Site: --  Mode: --  Orthostatic VS  08-25-24 @ 08:43  Lying BP: --/-- HR: --  Sitting BP: 101/59 HR: 79  Standing BP: --/-- HR: --  Site: upper left arm  Mode: electronic  Orthostatic VS  08-24-24 @ 19:08  Lying BP: --/-- HR: --  Sitting BP: 136/83 HR: 65  Standing BP: 120/72 HR: 108  Site: --  Mode: --   Vital Signs Last 24 Hrs  T(C): 36.7 (08-27-24 @ 19:07), Max: 36.7 (08-27-24 @ 19:07)  T(F): 98.1 (08-27-24 @ 19:07), Max: 98.1 (08-27-24 @ 19:07)  HR: --  BP: --  BP(mean): --  RR: --  SpO2: --    Orthostatic VS  08-27-24 @ 19:07  Lying BP: --/-- HR: --  Sitting BP: 129/87 HR: 85  Standing BP: 121/81 HR: 86  Site: --  Mode: --  Orthostatic VS  08-26-24 @ 19:15  Lying BP: --/-- HR: --  Sitting BP: 141/77 HR: 91  Standing BP: 122/90 HR: 96  Site: --  Mode: --

## 2024-12-23 NOTE — BH INPATIENT PSYCHIATRY PROGRESS NOTE - NSBHMSELANG_PSY_A_CORE
No abnormalities noted
Not applicable
No abnormalities noted

## 2025-02-12 ENCOUNTER — INPATIENT (INPATIENT)
Facility: HOSPITAL | Age: 30
LOS: 13 days | Discharge: HOME | End: 2025-02-26
Attending: STUDENT IN AN ORGANIZED HEALTH CARE EDUCATION/TRAINING PROGRAM | Admitting: PSYCHIATRY & NEUROLOGY
Payer: MEDICAID

## 2025-02-12 VITALS
HEIGHT: 68 IN | SYSTOLIC BLOOD PRESSURE: 141 MMHG | TEMPERATURE: 98 F | OXYGEN SATURATION: 100 % | HEART RATE: 97 BPM | WEIGHT: 160.06 LBS | DIASTOLIC BLOOD PRESSURE: 99 MMHG | RESPIRATION RATE: 16 BRPM

## 2025-02-12 LAB
APAP SERPL-MCNC: <10 UG/ML — LOW (ref 15–25)
BASOPHILS # BLD AUTO: 0.02 K/UL — SIGNIFICANT CHANGE UP (ref 0–0.2)
BASOPHILS NFR BLD AUTO: 0.3 % — SIGNIFICANT CHANGE UP (ref 0–2)
EOSINOPHIL # BLD AUTO: 0.05 K/UL — SIGNIFICANT CHANGE UP (ref 0–0.5)
EOSINOPHIL NFR BLD AUTO: 0.8 % — SIGNIFICANT CHANGE UP (ref 0–6)
ETHANOL SERPL-MCNC: <10 MG/DL — SIGNIFICANT CHANGE UP
HCT VFR BLD CALC: 45.9 % — SIGNIFICANT CHANGE UP (ref 39–50)
HGB BLD-MCNC: 15 G/DL — SIGNIFICANT CHANGE UP (ref 13–17)
IANC: 4.56 K/UL — SIGNIFICANT CHANGE UP (ref 1.8–7.4)
IMM GRANULOCYTES NFR BLD AUTO: 0.2 % — SIGNIFICANT CHANGE UP (ref 0–0.9)
LYMPHOCYTES # BLD AUTO: 1.12 K/UL — SIGNIFICANT CHANGE UP (ref 1–3.3)
LYMPHOCYTES # BLD AUTO: 18.3 % — SIGNIFICANT CHANGE UP (ref 13–44)
MCHC RBC-ENTMCNC: 26.9 PG — LOW (ref 27–34)
MCHC RBC-ENTMCNC: 32.7 G/DL — SIGNIFICANT CHANGE UP (ref 32–36)
MCV RBC AUTO: 82.3 FL — SIGNIFICANT CHANGE UP (ref 80–100)
MONOCYTES # BLD AUTO: 0.37 K/UL — SIGNIFICANT CHANGE UP (ref 0–0.9)
MONOCYTES NFR BLD AUTO: 6 % — SIGNIFICANT CHANGE UP (ref 2–14)
NEUTROPHILS # BLD AUTO: 4.56 K/UL — SIGNIFICANT CHANGE UP (ref 1.8–7.4)
NEUTROPHILS NFR BLD AUTO: 74.4 % — SIGNIFICANT CHANGE UP (ref 43–77)
NRBC # BLD AUTO: 0 K/UL — SIGNIFICANT CHANGE UP (ref 0–0)
NRBC # FLD: 0 K/UL — SIGNIFICANT CHANGE UP (ref 0–0)
NRBC BLD AUTO-RTO: 0 /100 WBCS — SIGNIFICANT CHANGE UP (ref 0–0)
PLATELET # BLD AUTO: 211 K/UL — SIGNIFICANT CHANGE UP (ref 150–400)
RBC # BLD: 5.58 M/UL — SIGNIFICANT CHANGE UP (ref 4.2–5.8)
RBC # FLD: 12.8 % — SIGNIFICANT CHANGE UP (ref 10.3–14.5)
SALICYLATES SERPL-MCNC: <0.3 MG/DL — LOW (ref 15–30)
TOXICOLOGY SCREEN, DRUGS OF ABUSE, SERUM RESULT: SIGNIFICANT CHANGE UP
WBC # BLD: 6.13 K/UL — SIGNIFICANT CHANGE UP (ref 3.8–10.5)
WBC # FLD AUTO: 6.13 K/UL — SIGNIFICANT CHANGE UP (ref 3.8–10.5)

## 2025-02-12 PROCEDURE — 99285 EMERGENCY DEPT VISIT HI MDM: CPT

## 2025-02-12 RX ORDER — OLANZAPINE 10 MG/1
5 TABLET ORAL EVERY 6 HOURS
Refills: 0 | Status: DISCONTINUED | OUTPATIENT
Start: 2025-02-13 | End: 2025-02-26

## 2025-02-12 RX ORDER — PALIPERIDONE 9 MG/1
3 TABLET, EXTENDED RELEASE ORAL AT BEDTIME
Refills: 0 | Status: DISCONTINUED | OUTPATIENT
Start: 2025-02-12 | End: 2025-02-13

## 2025-02-12 RX ORDER — PALIPERIDONE 9 MG/1
3 TABLET, EXTENDED RELEASE ORAL AT BEDTIME
Refills: 0 | Status: DISCONTINUED | OUTPATIENT
Start: 2025-02-13 | End: 2025-02-14

## 2025-02-12 RX ORDER — OLANZAPINE 10 MG/1
5 TABLET ORAL ONCE
Refills: 0 | Status: DISCONTINUED | OUTPATIENT
Start: 2025-02-13 | End: 2025-02-26

## 2025-02-12 RX ADMIN — PALIPERIDONE 3 MILLIGRAM(S): 9 TABLET, EXTENDED RELEASE ORAL at 23:59

## 2025-02-12 NOTE — ED BEHAVIORAL HEALTH ASSESSMENT NOTE - DESCRIPTION
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

## 2025-02-12 NOTE — ED PROVIDER NOTE - NEUROLOGICAL, MLM
please follow pre-printed instructions provided by Dr. Bustillo's office Alert and oriented, no focal deficits, no motor or sensory deficits.

## 2025-02-12 NOTE — ED BEHAVIORAL HEALTH ASSESSMENT NOTE - CURRENT INTENT:
[facility]    Progress Note - Pediatric Blue Team Service        Date of Admission:  11/28/2021    Assessment & Plan         Vivek Epstein is a previously healthy 19 [facility]        CANDIDO RIOS MD         SYSTEM ID:  [id number]     None known

## 2025-02-12 NOTE — ED PROVIDER NOTE - NSFOLLOWUPINSTRUCTIONS_ED_ALL_ED_FT
Follow up with your PMD within 48-72 hrs. Show copies of your reports given to you.   Worsening, continued or new concerning symptoms return to the emergency department.    You have been given information necessary to follow up with the  [facility] ([facility]) Crisis center & other outpatient  psychiatric clinics within your community    • [facility] walk in Crisis centre  [address] [place] [address]  [phone] [email or web address]  Hours of operation:  Day	                                        Hours  Sunday                                  Closed  Monday                                9am - 3pm  Tuesday                                9am - 3pm  Wednesday                          9am - 3pm  Thursday                               9am - 3pm  Friday                                    9am - 3pm  Saturday                                Closed

## 2025-02-12 NOTE — ED PROVIDER NOTE - CLINICAL SUMMARY MEDICAL DECISION MAKING FREE TEXT BOX
28 yo M PMH Schizoaffective Disorder p/w increased agitation and delusions. Patient admits to wanting to receive her Invega shot, loading dose and follow up dose. Patient came to ED requesting to go to [facility] under Noe Hahn. Patient reports the spirit of God is in him and that the beer is a spirit and does not belong in the temple of God. Denies fever, headache, dizziness, SI/HI/AH/VH, chills, chest pain, shortness of breath, abdominal pain, sick contact or recent travel. Denies alcohol use or other drugs.    Labs, EKG, COVID  SW collateral  Psych consult  Dispo as per consult

## 2025-02-12 NOTE — ED BEHAVIORAL HEALTH NOTE - BEHAVIORAL HEALTH NOTE
as per psyckes:    Current Care Coordination  Health Home (Enrolled)[facility] (Begin Date: 01-AUG-24) • Status : Active  Main Contact Referral : Lorena Jitendra: [phone], [email or web address]  Member Referral Number: [id number]; [email or web address]  Care Management (Enrolled): [facility]  Notifications  Complex Needs due toAOT active or  in past year , HH+ Eligibility , HH+ service in the past year with MH diagnosis  High Mental Health Need due toAOT active or  in the past year ; HH+ Eligibility    Diagnoses Past Year  Behavioral Health (3)  Most Recent:Schizophrenia • Cannabis related disorders • Alcohol related disorders  Most Frequent (# of services):Schizophrenia(1) • Cannabis related disorders(1) • Alcohol related disorders(1)  Medical (3)  Most Recent:Personal risk factors, not elsewhere classified • Sleep disorders • Encounter for screening for infectious and parasitic diseases  Most Frequent (# of services):Personal risk factors, not elsewhere classified(1) • Sleep disorders(1) • Encounter for screening for infectious and parasitic diseases(1)    Medications Past YearLast   Hydroxyzine Hcl (Hydroxyzine Hcl) • Anxiolytic/Hypnoticose: 50 MG, 4/day • Quantity: 120  Melatonin (Melatonin) • Alternative Medicine - M'ose: 3 MG, 1/day • Quantity: 30  Clotrimazole- Betamethasone (Clotrimazole-Betamethasone) • Antifungals - Topicalose: 1-0.05 %/day • Quantity: 30  Paliperidone Palmitate (Invega Sustenna) • Antipsychoticose: 78 MG/0.5ML, .02/day • Quantity: 0    All Hospital and Crisis Utilization • 5 Years  ER Visits# ProvidersLast ER Visit  No Medicaid claims for this data type in the past 5 years  Inpatient Admissions# ProvidersLast Inpatient Admission  [address] [place], [place] [address]/10/2024 at [facility]  Crisis Services# ProvidersLast Crisis Service

## 2025-02-12 NOTE — ED BEHAVIORAL HEALTH ASSESSMENT NOTE - OTHER PAST PSYCHIATRIC HISTORY (INCLUDE DETAILS REGARDING ONSET, COURSE OF ILLNESS, INPATIENT/OUTPATIENT TREATMENT)
Since discharge in September 2024 in [facility], has not been adherent with outpatient treatment, not currently taking any meds. hx of nonadherence. previously in AOT. Pt has multiple psych admissions. Dx of schizoaffective disorder. No history of suicide attempts.

## 2025-02-12 NOTE — ED ADULT TRIAGE NOTE - CHIEF COMPLAINT QUOTE
pt coming in requesting Invega sustenna injection. hx. schizoaffective. pt denies SI, HI, auditory or visual hallucinations, drug use, alcohol use or any complaints at this time.

## 2025-02-12 NOTE — ED BEHAVIORAL HEALTH ASSESSMENT NOTE - NSCURPASTPSYDX_PSY_ALL_CORE
Additional Area 1 Location: periorbitalsmile lines Psychotic disorder/Alcohol/Substance Use disorders

## 2025-02-12 NOTE — ED ADULT NURSE NOTE - NSFALLUNIVINTERV_ED_ALL_ED
Bed/Stretcher in lowest position, wheels locked, appropriate side rails in place/Call bell, personal items and telephone in reach/Instruct patient to call for assistance before getting out of bed/chair/stretcher/Non-slip footwear applied when patient is off stretcher/Gerald to call system/Physically safe environment - no spills, clutter or unnecessary equipment/Purposeful proactive rounding/Room/bathroom lighting operational, light cord in reach

## 2025-02-12 NOTE — ED BEHAVIORAL HEALTH ASSESSMENT NOTE - SUMMARY
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 with hx of schizophrenia vs schizoaffective disorder, substance use (daily alcohol and cannabis use in past per records - pt denies), non adherent to meds and psych aftercare, with prior hx of psych admissions (last August 2024), was on AOT (discontinued Jan 2024), no legal issues, no access to firearms, no PMH, BIBEMS for patient wanting to restart Invega to cleanse self.    On eval, patient is oddly-related, requesting Invega to cleanse self, religiously preoccupied, vague, and evasive although superficially cooperative. Has been medication nonadherent since discharge from  in September 2024, with known hx of schizophrenia vs schizoaffective disorder. Pt presenting requesting voluntary psych admission to restart medications, appropriate for inpatient psych admission    Plan:  1.	Legals: admit to 1N on 9.13  2.	Safety: routine observation, denies SI/HI/I/P on the unit. PRNs: Zyprexa 5mg PO/IM. Avoiding Haldol due to listed intolerance (EPS).  3.	Psychiatry: start Paliperidone 3 mg QHs  4.	Group, milieu, individual therapy as appropriate.  5.	Medical: CBC, CMP, lipid panel  6.	Dispo: pending clinical improvement.  Patient continues to require inpatient hospitalization for stabilization and safety.

## 2025-02-12 NOTE — ED BEHAVIORAL HEALTH ASSESSMENT NOTE - RISK ASSESSMENT
Risk factors include: single, male, insomnia, impulsivity, active psychosis, noncompliant with treatment/not receiving treatment, , limited insight into symptoms, absence of outpatient follow-up  Protective factors include: Young, healthy, denies SI/I/P, no history of suicide attempts, no history of NSSIB, identifies reasons for living,  future oriented, engaged in work, stable housing, strong social supports,

## 2025-02-12 NOTE — ED PROVIDER NOTE - ATTENDING APP SHARED VISIT CONTRIBUTION OF CARE
29-year-old male with a past medical schizoaffective disorder presents ED due to increased agitation and delusions.  Patient was given Invega, stop taking it in September.  When I evaluate the patient he stated that he was "fine."  He denied any SI, HI, hallucinations or any physical complaints.  He reported to the NP that the spirit of god his in him at the [employer].  And does not belong to [employer].    Physical exam:  Pleasant male in no acute distress  Heart is regular in rhythm without murmurs or gallops  Lungs are clear auscultation lungs without wheeze rales rhonchi  Abdomen is soft  He is calm, cooperative, denies any SI, HI oral sedations.    MDM:  Patient presents to the ED with a concern for increased illusions with decompensated schizoaffective disorder.    Blood reviewed, stable for inpatient psych treatment.  Viral swab negative for COVID.    admitted to psych 29-year-old male with a past medical schizoaffective disorder presents ED due to increased agitation and delusions.  Patient was given Invega, stop taking it in September.  When I evaluate the patient he stated that he was "fine."  He denied any SI, HI, hallucinations or any physical complaints.  He reported to the NP that the spirit of god his in him at the [employer].  And does not belong to [employer] [facility] [employer].    Physical exam:  Pleasant male in no acute distress  Heart is regular in rhythm without murmurs or gallops  Lungs are clear auscultation lungs without wheeze rales rhonchi  Abdomen is soft  He is calm, cooperative, denies any SI, HI oral sedations.    MDM:  Patient presents to the ED with a concern for increased illusions with decompensated schizoaffective disorder.    Blood reviewed, stable for inpatient psych treatment.  Viral swab negative for COVID.    ekg non ischemic    admitted to psych

## 2025-02-12 NOTE — ED BEHAVIORAL HEALTH ASSESSMENT NOTE - HPI (INCLUDE ILLNESS QUALITY, SEVERITY, DURATION, TIMING, CONTEXT, MODIFYING FACTORS, ASSOCIATED SIGNS AND SYMPTOMS)
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 with hx of schizophrenia vs schizoaffective disorder, substance use (daily alcohol and cannabis use in past per records - pt denies), non adherent to meds and psych aftercare, with prior hx of psych admissions (last August 2024), was on AOT (discontinued Jan 2024), no legal issues, no access to firearms, no PMH, BIBEMS for patient wanting to restart Invega to cleanse self.    Patient is cooperative with interview, states that he came to the ED today in order to restart his Invega to cleanse himself. Patient says that Mormon is important to him and that he believes that Invega is the "only way to purity." When asked to elaborate, patient cannot provide additional information. Patient denies AVH, ideas of reference, mind reading, thought insertion or broadcasting. Patient denies paranoia. Denies any depressed or anxious mood, no SI/HI. Endorses "okay sleep." No issues with appetite. Denies substance use during interview. No access to firearms. Denies manic symptoms such as elevated or irritable mood, decreased need for sleep, increased goal directed behavior, etc. When asked what specifically changed that made him want to come today, pt unable to provide additional information. Pt requests hospitalization and for writer to leave, noted to be concrete on interview.    See ED Behavior Health Note for collateral.

## 2025-02-12 NOTE — ED BEHAVIORAL HEALTH ASSESSMENT NOTE - NSBHATTESTCOMMENTATTENDFT_PSY_A_CORE
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 with hx of schizophrenia vs schizoaffective disorder, substance use (daily alcohol and cannabis use in past per records - pt denies), non adherent to meds and psych aftercare, with prior hx of psych admissions (last August 2024), was on AOT (discontinued Jan 2024), no legal issues, no access to firearms, no PMH, BIBEMS for patient wanting to restart Invega to cleanse self.  Patient on assessment , is guarded , limited historian , and presents religiously preoccupied. He reports "I here to have Invega  injection", when asked as to why he believes he needs the injection , he replies "it will clean my body". Patient is noted to be religiously preoccupied. When asked if he is experiencing any psychiatric symptoms, he states "there is nothing actually wrong with me , I just need injection." Patient denies all psychiatric sx , but is requesting that he needs to be readmitted to restart his medications . As per collateral , pt has not been himself and is not at his baseline .
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
## 2025-02-12 NOTE — ED ADULT NURSE NOTE - DOES PATIENT HAVE ADVANCE DIRECTIVE
Patient attended Medication Adherence Group.  Patient response documented in flow sheet. Flory Olson      Unknown

## 2025-02-13 DIAGNOSIS — F20.9 SCHIZOPHRENIA, UNSPECIFIED: ICD-10-CM

## 2025-02-13 LAB
ALBUMIN SERPL ELPH-MCNC: 4.7 G/DL — SIGNIFICANT CHANGE UP (ref 3.3–5)
ALP SERPL-CCNC: 62 U/L — SIGNIFICANT CHANGE UP (ref 40–120)
ALT FLD-CCNC: 13 U/L — SIGNIFICANT CHANGE UP (ref 4–41)
ANION GAP SERPL CALC-SCNC: 13 MMOL/L — SIGNIFICANT CHANGE UP (ref 7–14)
AST SERPL-CCNC: 17 U/L — SIGNIFICANT CHANGE UP (ref 4–40)
BILIRUB SERPL-MCNC: 0.4 MG/DL — SIGNIFICANT CHANGE UP (ref 0.2–1.2)
BUN SERPL-MCNC: 9 MG/DL — SIGNIFICANT CHANGE UP (ref 7–23)
CALCIUM SERPL-MCNC: 9.7 MG/DL — SIGNIFICANT CHANGE UP (ref 8.4–10.5)
CHLORIDE SERPL-SCNC: 101 MMOL/L — SIGNIFICANT CHANGE UP (ref 98–107)
CHOLEST SERPL-MCNC: 149 MG/DL — SIGNIFICANT CHANGE UP
CO2 SERPL-SCNC: 25 MMOL/L — SIGNIFICANT CHANGE UP (ref 22–31)
CREAT SERPL-MCNC: 0.86 MG/DL — SIGNIFICANT CHANGE UP (ref 0.5–1.3)
EGFR: 122 ML/MIN/1.73M2 — SIGNIFICANT CHANGE UP
GLUCOSE SERPL-MCNC: 114 MG/DL — HIGH (ref 70–99)
HDLC SERPL-MCNC: 37 MG/DL — LOW
LIPID PNL WITH DIRECT LDL SERPL: 99 MG/DL — SIGNIFICANT CHANGE UP
NON HDL CHOLESTEROL: 112 MG/DL — SIGNIFICANT CHANGE UP
POTASSIUM SERPL-MCNC: 5 MMOL/L — SIGNIFICANT CHANGE UP (ref 3.5–5.3)
POTASSIUM SERPL-SCNC: 5 MMOL/L — SIGNIFICANT CHANGE UP (ref 3.5–5.3)
PROT SERPL-MCNC: 7.3 G/DL — SIGNIFICANT CHANGE UP (ref 6–8.3)
SARS-COV-2 RNA SPEC QL NAA+PROBE: SIGNIFICANT CHANGE UP
SODIUM SERPL-SCNC: 139 MMOL/L — SIGNIFICANT CHANGE UP (ref 135–145)
TRIGL SERPL-MCNC: 64 MG/DL — SIGNIFICANT CHANGE UP
TSH SERPL-MCNC: 0.81 UIU/ML — SIGNIFICANT CHANGE UP (ref 0.27–4.2)

## 2025-02-13 PROCEDURE — 99222 1ST HOSP IP/OBS MODERATE 55: CPT

## 2025-02-13 RX ORDER — INFLUENZA A VIRUS A/IDAHO/07/2018 (H1N1) ANTIGEN (MDCK CELL DERIVED, PROPIOLACTONE INACTIVATED, INFLUENZA A VIRUS A/INDIANA/08/2018 (H3N2) ANTIGEN (MDCK CELL DERIVED, PROPIOLACTONE INACTIVATED), INFLUENZA B VIRUS B/SINGAPORE/INFTT-16-0610/2016 ANTIGEN (MDCK CELL DERIVED, PROPIOLACTONE INACTIVATED), INFLUENZA B VIRUS B/IOWA/06/2017 ANTIGEN (MDCK CELL DERIVED, PROPIOLACTONE INACTIVATED) 15; 15; 15; 15 UG/.5ML; UG/.5ML; UG/.5ML; UG/.5ML
0.5 INJECTION, SUSPENSION INTRAMUSCULAR ONCE
Refills: 0 | Status: DISCONTINUED | OUTPATIENT
Start: 2025-02-13 | End: 2025-02-17

## 2025-02-13 RX ADMIN — PALIPERIDONE 3 MILLIGRAM(S): 9 TABLET, EXTENDED RELEASE ORAL at 20:07

## 2025-02-13 NOTE — BH INPATIENT PSYCHIATRY ASSESSMENT NOTE - HPI (INCLUDE ILLNESS QUALITY, SEVERITY, DURATION, TIMING, CONTEXT, MODIFYING FACTORS, ASSOCIATED SIGNS AND SYMPTOMS)
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 with hx of schizophrenia vs schizoaffective disorder, substance use (daily alcohol and cannabis use in past per records - pt denies), non adherent to meds and psych aftercare, with prior hx of psych admissions (last August 2024), was on AOT (discontinued Jan 2024), no legal issues, no access to firearms, no PMH, BIBEMS for patient wanting to restart Invega to cleanse self, now admitted on 1N.    Patient seen in interview room.  States he wants to resume Invega injection "for my health".  Asked if he has a diagnosis that would warrant treatment with Invega, he states he does not have schizophrenia but "schizotypal".  Asked about specific psychotic symptoms such as AH and paranoia, he becomes overinclusive and thought disordered: the summary of his words seem to approximate that everyone has unusual experiences, not just him and what happens to him is not truly out of the ordinary.  He could not be specific about his experiences of either paranoia or AH.  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  He states if someone he respects had told him not to do this, he would not have.  Denies depressed mood or SI.  Participated well in unit "tea time" this afternoon.    Per ED assessment: <<Patient is cooperative with interview, states that he came to the ED today in order to restart his Invega to cleanse himself. Patient says that Muslim is important to him and that he believes that Invega is the "only way to purity." When asked to elaborate, patient cannot provide additional information. Patient denies AVH, ideas of reference, mind reading, thought insertion or broadcasting. Patient denies paranoia. Denies any depressed or anxious mood, no SI/HI. Endorses "okay sleep." No issues with appetite. Denies substance use during interview. No access to firearms. Denies manic symptoms such as elevated or irritable mood, decreased need for sleep, increased goal directed behavior, etc. When asked what specifically changed that made him want to come today, pt unable to provide additional information. Pt requests hospitalization and for writer to leave, noted to be concrete on interview.  See ED Behavior Health Note for collateral.>>

## 2025-02-13 NOTE — BH INPATIENT PSYCHIATRY ASSESSMENT NOTE - MSE UNSTRUCTURED FT
Appearance: appears stated age, casual clothing; good hygiene and grooming  Behavior: cooperative. fairly well related  Eye contact: fair  Motor: No abnormal movements, no psychomotor slowing or activation.  Speech: fluent, normal rate, volume, prosody  Mood: "fine"   Affect: neutral  Thought Process: poverty of content of speech, overinclusive, stilted  Thought Content: possible vague paranoia  Perceptual disturbance: equivocal response about AH, does not appear to be responding to internal stimuli  Insight: fair  Judgment: emerging  Impulse control:  poor  Cognition grossly intact.   Language: Fluent.  Gait: Intact.

## 2025-02-13 NOTE — BH INPATIENT PSYCHIATRY ASSESSMENT NOTE - CURRENT MEDICATION
MEDICATIONS  (STANDING):  influenza   Vaccine 0.5 milliLiter(s) IntraMuscular once  paliperidone ER. 3 milliGRAM(s) Oral at bedtime    MEDICATIONS  (PRN):  OLANZapine 5 milliGRAM(s) Oral every 6 hours PRN agitation  OLANZapine Injectable 5 milliGRAM(s) IntraMuscular Once PRN severe agitation

## 2025-02-13 NOTE — BH INPATIENT PSYCHIATRY ASSESSMENT NOTE - NSBHASSESSSUMMFT_PSY_ALL_CORE
29 year old man with schizophrenia, not currently in treatment, presenting from home BIBEMS requesting a return to Invega Sustenna.  He has significant formal thought disorder and does not cite any particular symptoms which make him want to resume medication.  Has tolerated PO Invega 3mg so far.  Was paranoid and said he did not want us to contact collateral at this time.      Admitted 9.27  Routine checks  Invega 3mg qHS, will titrate and transition to Invega Sustenna if responding.   Olanzapine 5mg Po/IM PRN for agitation

## 2025-02-13 NOTE — PSYCHIATRIC REHAB INITIAL EVALUATION - NSBHSIGPRIORMED_PSY_ALL_CORE
Per chart, hx of schizophrenia vs schizoaffective disorder, substance use (daily alcohol and cannabis use in past per records), non adherent to meds and psych aftercare, with prior hx of psych admissions (last August 2024), was on AOT (discontinued Jan 2024)/Yes (Specify)

## 2025-02-13 NOTE — PSYCHIATRIC REHAB INITIAL EVALUATION - HOW PATIENT ADDRESSED, PROFILE
10/27 INR 4.1 LM for patient to return call  
Spoke with patient via phone regarding anticoagulation monitoring.   Last INR on 10/18 was 2.7.  Dose maintained.   10/27 INR is 4.1 and is above goal range.    Current warfarin total weekly dose of 65 mg verified.  Informed the INR result is above therapeutic range and instructed to hold 11/1 and 11/2 doses as already took today's dose. Discussed dose and return date of 11/3 for next INR. See Anticoagulation flowsheet.    Dr Mack is in the office today supervising treatment. Note also forwarded to PCP.    Call your physician or seek medical care immediately if you notice any of the following symptoms of a bleed:   · Red, dark, coffee or cola colored urine  · Red or tar like stools  · Excessive bleeding from gums or nose  · Vomiting coffee colored or bright red material  · Coughing up red tinged sputum  · Severe or unprovoked pain (ex: severe Headache or Abdominal pain)  · Sudden, spontaneous bruising for no reason  · For female patients:  Excessive menstrual bleeding  · A cut that will not stop bleeding within 10-15 mins  · Symptoms associated with abnormal bleeding/high INR reviewed.    Encouraged to avoid activities that may result in a serious fall or injury and verbalizes understanding.    Instructed to contact the clinic with any unusual bleeding or bruising, any changes in medications, diet, health status, lifestyle, or any other changes, questions or concerns. Verbalized understanding of all discussed.   
Jacoby

## 2025-02-13 NOTE — BH INPATIENT PSYCHIATRY ASSESSMENT NOTE - NSBHMETABOLIC_PSY_ALL_CORE_FT
BMI: BMI (kg/m2): 24.3 (02-13-25 @ 04:04)  HbA1c:   Glucose:   BP: 141/99 (02-12-25 @ 21:58) (141/99 - 141/99)Vital Signs Last 24 Hrs  T(C): 36.9 (02-13-25 @ 04:04), Max: 36.9 (02-12-25 @ 21:58)  T(F): 98.4 (02-13-25 @ 04:04), Max: 98.5 (02-12-25 @ 21:58)  HR: 97 (02-12-25 @ 21:58) (97 - 97)  BP: 141/99 (02-12-25 @ 21:58) (141/99 - 141/99)  BP(mean): --  RR: 16 (02-12-25 @ 21:58) (16 - 16)  SpO2: 100% (02-12-25 @ 21:58) (100% - 100%)    Orthostatic VS  02-13-25 @ 04:04  Lying BP: --/-- HR: --  Sitting BP: 134/63 HR: 80  Standing BP: 124/83 HR: 78  Site: --  Mode: --    Lipid Panel: Date/Time: 02-12-25 @ 23:21  Cholesterol, Serum: 149  LDL Cholesterol Calculated: 99  HDL Cholesterol, Serum: 37  Total Cholesterol/HDL Ration Measurement: --  Triglycerides, Serum: 64

## 2025-02-13 NOTE — PSYCHIATRIC REHAB INITIAL EVALUATION - LIVES WITH
Per patient, patient lives with Elizabeth (Mother), Garrett (Father), and Reece (Brother)/parent(s)/sibling(s)
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
## 2025-02-13 NOTE — PSYCHIATRIC REHAB INITIAL EVALUATION - NSBHPRRECOMMEND_PSY_ALL_CORE
Writer met with patient to orient patient to unit 1N as well as the role/function of Activities Specialists and Inpatient Psychiatric Rehabilitation programming. Patient demonstrated full engagement with writer during initial session, presenting as appropriately dressed and well-groomed with a euthymic mood. Patient was able to identify an appropriate rehabilitation goal within the context of presenting symptoms defined in the behavioral health plan of care. Patient rehabilitation goal is to be able describe benefits of medication/treatment. Psychiatric Rehabilitation staff will meet with patient weekly to review progress towards goal.

## 2025-02-13 NOTE — BH SOCIAL WORK INITIAL PSYCHOSOCIAL EVALUATION - NSBHHOME_PSY_ALL_CORE
Patient states that [address] [facility] Home with Family

## 2025-02-13 NOTE — BH INPATIENT PSYCHIATRY ASSESSMENT NOTE - OTHER PAST PSYCHIATRIC HISTORY (INCLUDE DETAILS REGARDING ONSET, COURSE OF ILLNESS, INPATIENT/OUTPATIENT TREATMENT)
Since discharge in September 2024 in [facility], has not been adherent with outpatient treatment, not currently taking any meds. hx of nonadherence. previously in AOT. Pt has multiple psych admissions. Dx of schizoaffective disorder. No history of suicide attempts.

## 2025-02-13 NOTE — BH INPATIENT PSYCHIATRY ASSESSMENT NOTE - NSBHCHARTREVIEWVS_PSY_A_CORE FT
Vital Signs Last 24 Hrs  T(C): 36.9 (02-13-25 @ 04:04), Max: 36.9 (02-12-25 @ 21:58)  T(F): 98.4 (02-13-25 @ 04:04), Max: 98.5 (02-12-25 @ 21:58)  HR: 97 (02-12-25 @ 21:58) (97 - 97)  BP: 141/99 (02-12-25 @ 21:58) (141/99 - 141/99)  BP(mean): --  RR: 16 (02-12-25 @ 21:58) (16 - 16)  SpO2: 100% (02-12-25 @ 21:58) (100% - 100%)    Orthostatic VS  02-13-25 @ 04:04  Lying BP: --/-- HR: --  Sitting BP: 134/63 HR: 80  Standing BP: 124/83 HR: 78  Site: --  Mode: --

## 2025-02-14 PROCEDURE — 99231 SBSQ HOSP IP/OBS SF/LOW 25: CPT

## 2025-02-14 RX ORDER — PALIPERIDONE 9 MG/1
6 TABLET, EXTENDED RELEASE ORAL AT BEDTIME
Refills: 0 | Status: DISCONTINUED | OUTPATIENT
Start: 2025-02-14 | End: 2025-02-17

## 2025-02-14 RX ADMIN — PALIPERIDONE 6 MILLIGRAM(S): 9 TABLET, EXTENDED RELEASE ORAL at 21:07

## 2025-02-15 PROCEDURE — 99231 SBSQ HOSP IP/OBS SF/LOW 25: CPT

## 2025-02-15 RX ADMIN — PALIPERIDONE 6 MILLIGRAM(S): 9 TABLET, EXTENDED RELEASE ORAL at 20:41

## 2025-02-16 PROCEDURE — 99231 SBSQ HOSP IP/OBS SF/LOW 25: CPT

## 2025-02-16 RX ADMIN — PALIPERIDONE 6 MILLIGRAM(S): 9 TABLET, EXTENDED RELEASE ORAL at 20:10

## 2025-02-17 PROCEDURE — 99231 SBSQ HOSP IP/OBS SF/LOW 25: CPT

## 2025-02-17 RX ORDER — INFLUENZA A VIRUS A/IDAHO/07/2018 (H1N1) ANTIGEN (MDCK CELL DERIVED, PROPIOLACTONE INACTIVATED, INFLUENZA A VIRUS A/INDIANA/08/2018 (H3N2) ANTIGEN (MDCK CELL DERIVED, PROPIOLACTONE INACTIVATED), INFLUENZA B VIRUS B/SINGAPORE/INFTT-16-0610/2016 ANTIGEN (MDCK CELL DERIVED, PROPIOLACTONE INACTIVATED), INFLUENZA B VIRUS B/IOWA/06/2017 ANTIGEN (MDCK CELL DERIVED, PROPIOLACTONE INACTIVATED) 15; 15; 15; 15 UG/.5ML; UG/.5ML; UG/.5ML; UG/.5ML
0.5 INJECTION, SUSPENSION INTRAMUSCULAR ONCE
Refills: 0 | Status: COMPLETED | OUTPATIENT
Start: 2025-02-17 | End: 2025-02-26

## 2025-02-17 RX ORDER — PALIPERIDONE 9 MG/1
9 TABLET, EXTENDED RELEASE ORAL AT BEDTIME
Refills: 0 | Status: DISCONTINUED | OUTPATIENT
Start: 2025-02-17 | End: 2025-02-26

## 2025-02-17 RX ADMIN — PALIPERIDONE 9 MILLIGRAM(S): 9 TABLET, EXTENDED RELEASE ORAL at 20:05

## 2025-02-18 LAB — PROLACTIN SERPL-MCNC: 60.5 NG/ML — HIGH (ref 4.1–18.4)

## 2025-02-18 PROCEDURE — 99231 SBSQ HOSP IP/OBS SF/LOW 25: CPT

## 2025-02-18 PROCEDURE — 90853 GROUP PSYCHOTHERAPY: CPT

## 2025-02-18 RX ORDER — PALIPERIDONE 9 MG/1
156 TABLET, EXTENDED RELEASE ORAL
Qty: 1 | Refills: 0
Start: 2025-02-18 | End: 2025-03-19

## 2025-02-18 RX ORDER — PALIPERIDONE 9 MG/1
234 TABLET, EXTENDED RELEASE ORAL ONCE
Refills: 0 | Status: COMPLETED | OUTPATIENT
Start: 2025-02-18 | End: 2025-02-18

## 2025-02-18 RX ADMIN — PALIPERIDONE 234 MILLIGRAM(S): 9 TABLET, EXTENDED RELEASE ORAL at 18:13

## 2025-02-18 RX ADMIN — PALIPERIDONE 9 MILLIGRAM(S): 9 TABLET, EXTENDED RELEASE ORAL at 20:24

## 2025-02-18 NOTE — BH TREATMENT PLAN - NSTXPSYCHODATEEST_PSY_ALL_CORE

## 2025-02-18 NOTE — BH TREATMENT PLAN - NSTXDCOPNOINTERSW_PSY_ALL_CORE
SW provides support, psychoed, contact with collaterals, discharge planning and collaboration with treatment team.

## 2025-02-18 NOTE — BH PSYCHOLOGY - GROUP THERAPY NOTE - NSBHPSYCHOLPARTICIPCOMMENT_PSY_A_CORE FT
Pt attended first psychology group. He was attentive, cooperative, and oriented. Pt participated in check-in and actively participated in acceptance activity, sharing what he beena during the activity with peers.

## 2025-02-18 NOTE — BH PSYCHOLOGY - GROUP THERAPY NOTE - NSPSYCHOLGRPCOGPT_PSY_A_CORE FT
Patient attended recovery oriented/acceptance & commitment therapy group. Group started with check-in to encourage engagement and support members to consider topic of the day (acceptance) - members completed the following sentence "Acceptance is..." Group shared words like confidence, complex, self-righteous, and success. Facilitator oriented members to experiential activity aimed to demonstrate the practice of acceptance. Members engaged in a "drawing acceptance" activity where they each received a blank sheet of paper and were instructed to draw anything they would like on the page. At a random time interval, they were asked to pass their paper to the person to their left and received a new piece of paper from the person on their right and added to the drawing. Members reflected on the re-interpretations they noticed in their original drawing and the pleasant surprises from seeing how others contributed to the drawing. Facilitator highlighted the process of acceptance that occurred during the activity (e.g., members accepted the drawing of others and were willing to add to the page despite fears, doubts, etc.).  facilitated discussion of concepts, encouraged active participation, and supported members providing feedback to peers.

## 2025-02-19 PROCEDURE — 99231 SBSQ HOSP IP/OBS SF/LOW 25: CPT

## 2025-02-19 RX ADMIN — PALIPERIDONE 9 MILLIGRAM(S): 9 TABLET, EXTENDED RELEASE ORAL at 20:31

## 2025-02-20 RX ADMIN — PALIPERIDONE 9 MILLIGRAM(S): 9 TABLET, EXTENDED RELEASE ORAL at 20:01

## 2025-02-21 PROCEDURE — 99231 SBSQ HOSP IP/OBS SF/LOW 25: CPT

## 2025-02-21 RX ADMIN — PALIPERIDONE 9 MILLIGRAM(S): 9 TABLET, EXTENDED RELEASE ORAL at 20:07

## 2025-02-22 RX ADMIN — PALIPERIDONE 9 MILLIGRAM(S): 9 TABLET, EXTENDED RELEASE ORAL at 20:34

## 2025-02-23 RX ADMIN — PALIPERIDONE 9 MILLIGRAM(S): 9 TABLET, EXTENDED RELEASE ORAL at 20:39

## 2025-02-24 VITALS — TEMPERATURE: 98 F

## 2025-02-24 PROCEDURE — 99231 SBSQ HOSP IP/OBS SF/LOW 25: CPT

## 2025-02-24 RX ORDER — PALIPERIDONE 9 MG/1
1 TABLET, EXTENDED RELEASE ORAL
Qty: 14 | Refills: 0
Start: 2025-02-24 | End: 2025-03-09

## 2025-02-24 RX ORDER — PALIPERIDONE 9 MG/1
1 TABLET, EXTENDED RELEASE ORAL
Qty: 0 | Refills: 0 | DISCHARGE
Start: 2025-02-24

## 2025-02-24 RX ORDER — PALIPERIDONE 9 MG/1
156 TABLET, EXTENDED RELEASE ORAL ONCE
Refills: 0 | Status: COMPLETED | OUTPATIENT
Start: 2025-02-24 | End: 2025-02-24

## 2025-02-24 RX ADMIN — PALIPERIDONE 9 MILLIGRAM(S): 9 TABLET, EXTENDED RELEASE ORAL at 20:13

## 2025-02-24 RX ADMIN — PALIPERIDONE 156 MILLIGRAM(S): 9 TABLET, EXTENDED RELEASE ORAL at 18:55

## 2025-02-25 LAB
A1C WITH ESTIMATED AVERAGE GLUCOSE RESULT: 5.4 % — SIGNIFICANT CHANGE UP (ref 4–5.6)
ALBUMIN SERPL ELPH-MCNC: 4.5 G/DL — SIGNIFICANT CHANGE UP (ref 3.3–5)
ALP SERPL-CCNC: 59 U/L — SIGNIFICANT CHANGE UP (ref 40–120)
ALT FLD-CCNC: 25 U/L — SIGNIFICANT CHANGE UP (ref 4–41)
ANION GAP SERPL CALC-SCNC: 14 MMOL/L — SIGNIFICANT CHANGE UP (ref 7–14)
AST SERPL-CCNC: 20 U/L — SIGNIFICANT CHANGE UP (ref 4–40)
BASOPHILS # BLD AUTO: 0.02 K/UL — SIGNIFICANT CHANGE UP (ref 0–0.2)
BASOPHILS NFR BLD AUTO: 0.3 % — SIGNIFICANT CHANGE UP (ref 0–2)
BILIRUB SERPL-MCNC: 0.4 MG/DL — SIGNIFICANT CHANGE UP (ref 0.2–1.2)
BUN SERPL-MCNC: 17 MG/DL — SIGNIFICANT CHANGE UP (ref 7–23)
CALCIUM SERPL-MCNC: 9.6 MG/DL — SIGNIFICANT CHANGE UP (ref 8.4–10.5)
CHLORIDE SERPL-SCNC: 102 MMOL/L — SIGNIFICANT CHANGE UP (ref 98–107)
CHOLEST SERPL-MCNC: 185 MG/DL — SIGNIFICANT CHANGE UP
CO2 SERPL-SCNC: 25 MMOL/L — SIGNIFICANT CHANGE UP (ref 22–31)
CREAT SERPL-MCNC: 0.94 MG/DL — SIGNIFICANT CHANGE UP (ref 0.5–1.3)
EGFR: 114 ML/MIN/1.73M2 — SIGNIFICANT CHANGE UP
EOSINOPHIL # BLD AUTO: 0.19 K/UL — SIGNIFICANT CHANGE UP (ref 0–0.5)
EOSINOPHIL NFR BLD AUTO: 2.8 % — SIGNIFICANT CHANGE UP (ref 0–6)
ESTIMATED AVERAGE GLUCOSE: 108 — SIGNIFICANT CHANGE UP
GLUCOSE SERPL-MCNC: 89 MG/DL — SIGNIFICANT CHANGE UP (ref 70–99)
HCT VFR BLD CALC: 48.6 % — SIGNIFICANT CHANGE UP (ref 39–50)
HDLC SERPL-MCNC: 39 MG/DL — LOW
HGB BLD-MCNC: 15.9 G/DL — SIGNIFICANT CHANGE UP (ref 13–17)
IANC: 4.36 K/UL — SIGNIFICANT CHANGE UP (ref 1.8–7.4)
IMM GRANULOCYTES NFR BLD AUTO: 0.3 % — SIGNIFICANT CHANGE UP (ref 0–0.9)
LIPID PNL WITH DIRECT LDL SERPL: 132 MG/DL — HIGH
LYMPHOCYTES # BLD AUTO: 1.75 K/UL — SIGNIFICANT CHANGE UP (ref 1–3.3)
LYMPHOCYTES # BLD AUTO: 26 % — SIGNIFICANT CHANGE UP (ref 13–44)
MCHC RBC-ENTMCNC: 26.9 PG — LOW (ref 27–34)
MCHC RBC-ENTMCNC: 32.7 G/DL — SIGNIFICANT CHANGE UP (ref 32–36)
MCV RBC AUTO: 82.4 FL — SIGNIFICANT CHANGE UP (ref 80–100)
MONOCYTES # BLD AUTO: 0.4 K/UL — SIGNIFICANT CHANGE UP (ref 0–0.9)
MONOCYTES NFR BLD AUTO: 5.9 % — SIGNIFICANT CHANGE UP (ref 2–14)
NEUTROPHILS # BLD AUTO: 4.36 K/UL — SIGNIFICANT CHANGE UP (ref 1.8–7.4)
NEUTROPHILS NFR BLD AUTO: 64.7 % — SIGNIFICANT CHANGE UP (ref 43–77)
NON HDL CHOLESTEROL: 146 MG/DL — HIGH
NRBC # BLD AUTO: 0 K/UL — SIGNIFICANT CHANGE UP (ref 0–0)
NRBC # FLD: 0 K/UL — SIGNIFICANT CHANGE UP (ref 0–0)
NRBC BLD AUTO-RTO: 0 /100 WBCS — SIGNIFICANT CHANGE UP (ref 0–0)
PLATELET # BLD AUTO: 191 K/UL — SIGNIFICANT CHANGE UP (ref 150–400)
POTASSIUM SERPL-MCNC: 4.8 MMOL/L — SIGNIFICANT CHANGE UP (ref 3.5–5.3)
POTASSIUM SERPL-SCNC: 4.8 MMOL/L — SIGNIFICANT CHANGE UP (ref 3.5–5.3)
PROLACTIN SERPL-MCNC: 84.1 NG/ML — HIGH (ref 4.1–18.4)
PROT SERPL-MCNC: 7.2 G/DL — SIGNIFICANT CHANGE UP (ref 6–8.3)
RBC # BLD: 5.9 M/UL — HIGH (ref 4.2–5.8)
RBC # FLD: 12.8 % — SIGNIFICANT CHANGE UP (ref 10.3–14.5)
SODIUM SERPL-SCNC: 141 MMOL/L — SIGNIFICANT CHANGE UP (ref 135–145)
TRIGL SERPL-MCNC: 77 MG/DL — SIGNIFICANT CHANGE UP
WBC # BLD: 6.74 K/UL — SIGNIFICANT CHANGE UP (ref 3.8–10.5)
WBC # FLD AUTO: 6.74 K/UL — SIGNIFICANT CHANGE UP (ref 3.8–10.5)

## 2025-02-25 PROCEDURE — 99231 SBSQ HOSP IP/OBS SF/LOW 25: CPT

## 2025-02-25 RX ADMIN — PALIPERIDONE 9 MILLIGRAM(S): 9 TABLET, EXTENDED RELEASE ORAL at 20:29

## 2025-02-25 NOTE — BH INPATIENT PSYCHIATRY PROGRESS NOTE - NSTXMEDICGOAL_PSY_ALL_CORE
Be able to describe the benefit of medication/treatment
Take all medications as prescribed
Be able to describe the benefit of medication/treatment

## 2025-02-25 NOTE — BH INPATIENT PSYCHIATRY PROGRESS NOTE - CURRENT MEDICATION
MEDICATIONS  (STANDING):  influenza   Vaccine 0.5 milliLiter(s) IntraMuscular once  paliperidone ER 9 milliGRAM(s) Oral at bedtime    MEDICATIONS  (PRN):  OLANZapine 5 milliGRAM(s) Oral every 6 hours PRN agitation  OLANZapine Injectable 5 milliGRAM(s) IntraMuscular Once PRN severe agitation  
MEDICATIONS  (STANDING):  influenza   Vaccine 0.5 milliLiter(s) IntraMuscular once  paliperidone ER 9 milliGRAM(s) Oral at bedtime  paliperidone Injectable, Long Acting 234 milliGRAM(s) IntraMuscular once    MEDICATIONS  (PRN):  OLANZapine 5 milliGRAM(s) Oral every 6 hours PRN agitation  OLANZapine Injectable 5 milliGRAM(s) IntraMuscular Once PRN severe agitation  
MEDICATIONS  (STANDING):  influenza   Vaccine 0.5 milliLiter(s) IntraMuscular once  paliperidone ER 9 milliGRAM(s) Oral at bedtime    MEDICATIONS  (PRN):  OLANZapine 5 milliGRAM(s) Oral every 6 hours PRN agitation  OLANZapine Injectable 5 milliGRAM(s) IntraMuscular Once PRN severe agitation  
MEDICATIONS  (STANDING):  influenza   Vaccine 0.5 milliLiter(s) IntraMuscular once  paliperidone ER. 3 milliGRAM(s) Oral at bedtime    MEDICATIONS  (PRN):  OLANZapine 5 milliGRAM(s) Oral every 6 hours PRN agitation  OLANZapine Injectable 5 milliGRAM(s) IntraMuscular Once PRN severe agitation  
MEDICATIONS  (STANDING):  influenza   Vaccine 0.5 milliLiter(s) IntraMuscular once  paliperidone ER 6 milliGRAM(s) Oral at bedtime    MEDICATIONS  (PRN):  OLANZapine 5 milliGRAM(s) Oral every 6 hours PRN agitation  OLANZapine Injectable 5 milliGRAM(s) IntraMuscular Once PRN severe agitation  
MEDICATIONS  (STANDING):  influenza   Vaccine 0.5 milliLiter(s) IntraMuscular once  paliperidone ER 6 milliGRAM(s) Oral at bedtime    MEDICATIONS  (PRN):  OLANZapine 5 milliGRAM(s) Oral every 6 hours PRN agitation  OLANZapine Injectable 5 milliGRAM(s) IntraMuscular Once PRN severe agitation  
MEDICATIONS  (STANDING):  influenza   Vaccine 0.5 milliLiter(s) IntraMuscular once  paliperidone ER 9 milliGRAM(s) Oral at bedtime    MEDICATIONS  (PRN):  OLANZapine 5 milliGRAM(s) Oral every 6 hours PRN agitation  OLANZapine Injectable 5 milliGRAM(s) IntraMuscular Once PRN severe agitation

## 2025-02-25 NOTE — BH INPATIENT PSYCHIATRY PROGRESS NOTE - NSTXPSYCHODATETRGT_PSY_ALL_CORE
19-Feb-2025
26-Feb-2025
19-Feb-2025
19-Feb-2025
26-Feb-2025

## 2025-02-25 NOTE — BH INPATIENT PSYCHIATRY PROGRESS NOTE - NSTXDCOPNODATEEST_PSY_ALL_CORE
13-Feb-2025

## 2025-02-25 NOTE — BH INPATIENT PSYCHIATRY PROGRESS NOTE - NSDCCRITERIA_PSY_ALL_CORE
When pt is no longer an acute or imminent risk of harm to self or others, and is able to care for self safely, pt may then be discharged. 

## 2025-02-25 NOTE — BH DISCHARGE NOTE NURSING/SOCIAL WORK/PSYCH REHAB - NSBHDCADDR1FT_A_CORE
The [facility] program is located at [address] [place] [address]. The best entrance is on [address] [place] [address] [address] Please arrive at your appointment at 9:30am, and bring photo ID and insurance card.

## 2025-02-25 NOTE — BH INPATIENT PSYCHIATRY PROGRESS NOTE - NSBHASSESSSUMMFT_PSY_ALL_CORE
29 year old man with schizophrenia, not currently in treatment, presenting from home BIBEMS requesting a return to Invega [facility]ust[facility].  He has significant formal thought disorder and does not cite any particular symptoms which make him want to resume medication.  Has tolerated PO Invega 3mg so far.  Was paranoid and said he did not want us to contact collateral at this time.      Tolerating paliperidone oral and HEALY 234 mg IM 2/18, 156mg 2/24.  More social on the unit. Planning discharge 2/26    PLAN  Admitted 9.27  Routine checks  Invega to 9 mg qHS, will titrate and transition to Invega Sustenna   Olanzapine 5mg Po/IM PRN for agitation
29 year old man with schizophrenia, not currently in treatment, presenting from home BIBEMS requesting a return to Invega S[facility].  He has significant formal thought disorder and does not cite any particular symptoms which make him want to resume medication.  Has tolerated PO Invega 3mg so far.  Was paranoid and said he did not want us to contact collateral at this time.     2/15 Update  Remains withdrawn and paranoid   PLAN  Admitted 9.27  Routine checks  Invega to 6mg qHS, will titrate and transition to Invega Sustenna if responding.   Olanzapine 5mg Po/IM PRN for agitation
29 year old man with schizophrenia, not currently in treatment, presenting from home BIBEMS requesting a return to Invega [facility].  He has significant formal thought disorder and does not cite any particular symptoms which make him want to resume medication.  Has tolerated PO Invega 3mg so far.  Was paranoid and said he did not want us to contact collateral at this time.     2/18 Update  Remains withdrawn and in his room and paranoid about peers but reports feeling better  Tolerating paliperidone and will give HEALY today of 234 mg IM   PLAN  Admitted 9.27  Routine checks  Invega to 9 mg qHS, will titrate and transition to Invega Sustenna if responding.   Olanzapine 5mg Po/IM PRN for agitation
29 year old man with schizophrenia, not currently in treatment, presenting from home BIBEMS requesting a return to Invega S[facility].  He has significant formal thought disorder and does not cite any particular symptoms which make him want to resume medication.  Has tolerated PO Invega 3mg so far.  Was paranoid and said he did not want us to contact collateral at this time.     2/16 Update  Remains withdrawn and paranoid but reports feeling better  PLAN  Admitted 9.27  Routine checks  Invega to 6mg qHS, will titrate and transition to Invega Sustenna if responding.   Olanzapine 5mg Po/IM PRN for agitation
29 year old man with schizophrenia, not currently in treatment, presenting from home BIBEMS requesting a return to Invega [facility].  He has significant formal thought disorder and does not cite any particular symptoms which make him want to resume medication.  Has tolerated PO Invega 3mg so far.  Was paranoid and said he did not want us to contact collateral at this time.      Admitted 9.27  Routine checks  Invega to 6mg qHS, will titrate and transition to Invega Sustenna if responding.   Olanzapine 5mg Po/IM PRN for agitation
29 year old man with schizophrenia, not currently in treatment, presenting from home BIBEMS requesting a return to Invega S[facility]enna.  He has significant formal thought disorder and does not cite any particular symptoms which make him want to resume medication.  Has tolerated PO Invega 3mg so far.  Was paranoid and said he did not want us to contact collateral at this time.     2/16 & 2/17 Update  Remains withdrawn and in his room and paranoid about peers but reports feeling better  PLAN  Admitted 9.27  Routine checks  Invega to 9 mg qHS, will titrate and transition to Invega Sustenna if responding.   Olanzapine 5mg Po/IM PRN for agitation
29 year old man with schizophrenia, not currently in treatment, presenting from home BIBEMS requesting a return to Invega [facility].  He has significant formal thought disorder and does not cite any particular symptoms which make him want to resume medication.  Has tolerated PO Invega 3mg so far.  Was paranoid and said he did not want us to contact collateral at this time.     2/19 Update  Remains withdrawn but out of his room more and asking about second injection of HEALY  Reports feeling less anxious  Tolerating paliperidone oral and HEALY 234 mg IM 2/18  PLAN  Admitted 9.27  Routine checks  Invega to 9 mg qHS, will titrate and transition to Invega Sustenna   Olanzapine 5mg Po/IM PRN for agitation
29 year old man with schizophrenia, not currently in treatment, presenting from home BIBEMS requesting a return to Invega S[facility].  He has significant formal thought disorder and does not cite any particular symptoms which make him want to resume medication.  Has tolerated PO Invega 3mg so far.  Was paranoid and said he did not want us to contact collateral at this time.      Tolerating paliperidone oral and HEALY 234 mg IM 2/18.  More social on the unit.     PLAN  Admitted 9.27  Routine checks  Invega to 9 mg qHS, will titrate and transition to Invega Sustenna   Olanzapine 5mg Po/IM PRN for agitation
29 year old man with schizophrenia, not currently in treatment, presenting from home BIBEMS requesting a return to Invega S[facility].  He has significant formal thought disorder and does not cite any particular symptoms which make him want to resume medication.  Has tolerated PO Invega 3mg so far.  Was paranoid and said he did not want us to contact collateral at this time.      2/25 Update  Tolerating paliperidone oral and HEALY 234 mg IM 2/18, 156mg 2/24.  More social on the unit. Planning discharge 2/26    PLAN  Admitted 9.27  Routine checks  Invega to 9 mg qHS, will titrate and transition to Invega Sustenna   Olanzapine 5mg Po/IM PRN for agitation
29 year old man with schizophrenia, not currently in treatment, presenting from home BIBEMS requesting a return to Invega Sustenna.  He has significant formal thought disorder and does not cite any particular symptoms which make him want to resume medication.  Has tolerated PO Invega 3mg so far.  Was paranoid and said he did not want us to contact collateral at this time.      Tolerating paliperidone oral and HEALY 234 mg IM 2/18.  More social on the unit.     PLAN  Admitted 9.27  Routine checks  Invega to 9 mg qHS, will titrate and transition to Invega Sustenna   Olanzapine 5mg Po/IM PRN for agitation

## 2025-02-25 NOTE — BH INPATIENT PSYCHIATRY PROGRESS NOTE - NSBHATTESTBILLING_PSY_A_CORE
28406-Vdqrzikfib OBS or IP - low complexity OR 25-34 mins
81202-Vpjjufphhg OBS or IP - low complexity OR 25-34 mins
77039-Riyoweowbc OBS or IP - low complexity OR 25-34 mins
23173-Lxbcioqgqr OBS or IP - low complexity OR 25-34 mins
44037-Zitccaqxbx OBS or IP - low complexity OR 25-34 mins
14267-Plcvcygmvf OBS or IP - low complexity OR 25-34 mins
79023-Pcjduszzcw OBS or IP - low complexity OR 25-34 mins
75555-Edgztbkugr OBS or IP - low complexity OR 25-34 mins
54414-Rgqqbaeeaq OBS or IP - low complexity OR 25-34 mins
65764-Evqgzqmmdo OBS or IP - low complexity OR 25-34 mins

## 2025-02-25 NOTE — BH DISCHARGE NOTE NURSING/SOCIAL WORK/PSYCH REHAB - DISCHARGE INSTRUCTIONS AFTERCARE APPOINTMENTS
In order to check the location, date, or time of your aftercare appointment, please refer to your Discharge Instructions Document given to you upon leaving the hospital.  If you have lost the instructions please call [phone]

## 2025-02-25 NOTE — BH INPATIENT PSYCHIATRY PROGRESS NOTE - NSBHFUPINTERVALCCFT_PSY_A_CORE
psychosis
"I am willing to do the injection again r"
"I think I'm staring to feel better"
"I think I'm doing pretty well today!"
"I do think this Invega helps me"
psychosis
"I slept pretty well last night"
psychosis
psychosis
"Now that I had the first shot .. how soon can I get te second one??"

## 2025-02-25 NOTE — BH INPATIENT PSYCHIATRY PROGRESS NOTE - NSBHCHARTREVIEWVS_PSY_A_CORE FT
Vital Signs Last 24 Hrs  T(C): 36.4 (02-24-25 @ 19:51), Max: 36.4 (02-24-25 @ 19:51)  T(F): 97.6 (02-24-25 @ 19:51), Max: 97.6 (02-24-25 @ 19:51)  HR: --  BP: --  BP(mean): --  RR: --  SpO2: --    Orthostatic VS  02-24-25 @ 19:51  Lying BP: --/-- HR: --  Sitting BP: 151/104 HR: 132  Standing BP: 152/102 HR: 136  Site: --  Mode: --  
Vital Signs Last 24 Hrs  T(C): 36.1 (02-14-25 @ 06:36), Max: 36.1 (02-14-25 @ 06:36)  T(F): 96.9 (02-14-25 @ 06:36), Max: 96.9 (02-14-25 @ 06:36)  HR: --  BP: --  BP(mean): --  RR: --  SpO2: --    Orthostatic VS  02-13-25 @ 04:04  Lying BP: --/-- HR: --  Sitting BP: 134/63 HR: 80  Standing BP: 124/83 HR: 78  Site: --  Mode: --  
Vital Signs Last 24 Hrs  T(C): --  T(F): --  HR: --  BP: --  BP(mean): --  RR: --  SpO2: --    
Vital Signs Last 24 Hrs  T(C): --  T(F): --  HR: --  BP: --  BP(mean): --  RR: --  SpO2: --    
Vital Signs Last 24 Hrs  T(C): 36.4 (02-24-25 @ 19:51), Max: 36.4 (02-24-25 @ 19:51)  T(F): 97.6 (02-24-25 @ 19:51), Max: 97.6 (02-24-25 @ 19:51)  HR: --  BP: --  BP(mean): --  RR: --  SpO2: --    Orthostatic VS  02-24-25 @ 19:51  Lying BP: --/-- HR: --  Sitting BP: 151/104 HR: 132  Standing BP: 152/102 HR: 136  Site: --  Mode: --  
Vital Signs Last 24 Hrs  T(C): --  T(F): --  HR: --  BP: --  BP(mean): --  RR: --  SpO2: --    

## 2025-02-25 NOTE — BH INPATIENT PSYCHIATRY PROGRESS NOTE - NSTXMEDICDATETRGT_PSY_ALL_CORE
20-Feb-2025
26-Feb-2025
20-Feb-2025
20-Feb-2025
27-Feb-2025
20-Feb-2025
27-Feb-2025
20-Feb-2025

## 2025-02-25 NOTE — BH INPATIENT PSYCHIATRY PROGRESS NOTE - NSBHMETABOLIC_PSY_ALL_CORE_FT
BMI: BMI (kg/m2): 24.3 (02-13-25 @ 04:04)  HbA1c: A1C with Estimated Average Glucose Result: 5.4 % (02-25-25 @ 08:34)    Glucose:   BP: --Vital Signs Last 24 Hrs  T(C): 36.4 (02-24-25 @ 19:51), Max: 36.4 (02-24-25 @ 19:51)  T(F): 97.6 (02-24-25 @ 19:51), Max: 97.6 (02-24-25 @ 19:51)  HR: --  BP: --  BP(mean): --  RR: --  SpO2: --    Orthostatic VS  02-24-25 @ 19:51  Lying BP: --/-- HR: --  Sitting BP: 151/104 HR: 132  Standing BP: 152/102 HR: 136  Site: --  Mode: --    Lipid Panel: Date/Time: 02-25-25 @ 08:34  Cholesterol, Serum: 185  LDL Cholesterol Calculated: 132  HDL Cholesterol, Serum: 39  Total Cholesterol/HDL Ration Measurement: --  Triglycerides, Serum: 77  
BMI: BMI (kg/m2): 24.3 (02-13-25 @ 04:04)  HbA1c:   Glucose:   BP: --Vital Signs Last 24 Hrs  T(C): --  T(F): --  HR: --  BP: --  BP(mean): --  RR: --  SpO2: --      Lipid Panel: Date/Time: 02-12-25 @ 23:21  Cholesterol, Serum: 149  LDL Cholesterol Calculated: 99  HDL Cholesterol, Serum: 37  Total Cholesterol/HDL Ration Measurement: --  Triglycerides, Serum: 64  
BMI: BMI (kg/m2): 24.3 (02-13-25 @ 04:04)  HbA1c:   Glucose:   BP: 141/99 (02-12-25 @ 21:58) (141/99 - 141/99)Vital Signs Last 24 Hrs  T(C): --  T(F): --  HR: --  BP: --  BP(mean): --  RR: --  SpO2: --      Lipid Panel: Date/Time: 02-12-25 @ 23:21  Cholesterol, Serum: 149  LDL Cholesterol Calculated: 99  HDL Cholesterol, Serum: 37  Total Cholesterol/HDL Ration Measurement: --  Triglycerides, Serum: 64  
BMI: BMI (kg/m2): 24.3 (02-13-25 @ 04:04)  HbA1c:   Glucose:   BP: --Vital Signs Last 24 Hrs  T(C): --  T(F): --  HR: --  BP: --  BP(mean): --  RR: --  SpO2: --      Lipid Panel: Date/Time: 02-12-25 @ 23:21  Cholesterol, Serum: 149  LDL Cholesterol Calculated: 99  HDL Cholesterol, Serum: 37  Total Cholesterol/HDL Ration Measurement: --  Triglycerides, Serum: 64  
BMI: BMI (kg/m2): 24.3 (02-13-25 @ 04:04)  HbA1c:   Glucose:   BP: 141/99 (02-12-25 @ 21:58) (141/99 - 141/99)Vital Signs Last 24 Hrs  T(C): 36.1 (02-14-25 @ 06:36), Max: 36.1 (02-14-25 @ 06:36)  T(F): 96.9 (02-14-25 @ 06:36), Max: 96.9 (02-14-25 @ 06:36)  HR: --  BP: --  BP(mean): --  RR: --  SpO2: --    Orthostatic VS  02-13-25 @ 04:04  Lying BP: --/-- HR: --  Sitting BP: 134/63 HR: 80  Standing BP: 124/83 HR: 78  Site: --  Mode: --    Lipid Panel: Date/Time: 02-12-25 @ 23:21  Cholesterol, Serum: 149  LDL Cholesterol Calculated: 99  HDL Cholesterol, Serum: 37  Total Cholesterol/HDL Ration Measurement: --  Triglycerides, Serum: 64  
BMI: BMI (kg/m2): 24.3 (02-13-25 @ 04:04)  HbA1c:   Glucose:   BP: --Vital Signs Last 24 Hrs  T(C): --  T(F): --  HR: --  BP: --  BP(mean): --  RR: --  SpO2: --      Lipid Panel: Date/Time: 02-12-25 @ 23:21  Cholesterol, Serum: 149  LDL Cholesterol Calculated: 99  HDL Cholesterol, Serum: 37  Total Cholesterol/HDL Ration Measurement: --  Triglycerides, Serum: 64  
BMI: BMI (kg/m2): 24.3 (02-13-25 @ 04:04)  HbA1c:   Glucose:   BP: --Vital Signs Last 24 Hrs  T(C): 36.4 (02-24-25 @ 19:51), Max: 36.4 (02-24-25 @ 19:51)  T(F): 97.6 (02-24-25 @ 19:51), Max: 97.6 (02-24-25 @ 19:51)  HR: --  BP: --  BP(mean): --  RR: --  SpO2: --    Orthostatic VS  02-24-25 @ 19:51  Lying BP: --/-- HR: --  Sitting BP: 151/104 HR: 132  Standing BP: 152/102 HR: 136  Site: --  Mode: --    Lipid Panel: Date/Time: 02-12-25 @ 23:21  Cholesterol, Serum: 149  LDL Cholesterol Calculated: 99  HDL Cholesterol, Serum: 37  Total Cholesterol/HDL Ration Measurement: --  Triglycerides, Serum: 64  
BMI: BMI (kg/m2): 24.3 (02-13-25 @ 04:04)  HbA1c:   Glucose:   BP: --Vital Signs Last 24 Hrs  T(C): --  T(F): --  HR: --  BP: --  BP(mean): --  RR: --  SpO2: --      Lipid Panel: Date/Time: 02-12-25 @ 23:21  Cholesterol, Serum: 149  LDL Cholesterol Calculated: 99  HDL Cholesterol, Serum: 37  Total Cholesterol/HDL Ration Measurement: --  Triglycerides, Serum: 64  

## 2025-02-25 NOTE — BH INPATIENT PSYCHIATRY PROGRESS NOTE - NSTXDCOPNODATETRGT_PSY_ALL_CORE
20-Feb-2025
27-Feb-2025
27-Feb-2025
20-Feb-2025
27-Feb-2025
20-Feb-2025
27-Feb-2025

## 2025-02-25 NOTE — BH DISCHARGE NOTE NURSING/SOCIAL WORK/PSYCH REHAB - NSDCPRGOAL_PSY_ALL_CORE
Pt met specified psych rehab goal of demonstrating medication compliance and identifying benefits of compliance, as pt has been adhering to medications and reports overall improvements.  Pt completed a safety plan upon discharge.

## 2025-02-25 NOTE — BH INPATIENT PSYCHIATRY PROGRESS NOTE - NSTXMEDICDATEEST_PSY_ALL_CORE
13-Feb-2025
19-Feb-2025

## 2025-02-25 NOTE — BH INPATIENT PSYCHIATRY PROGRESS NOTE - NSTXPSYCHODATEEST_PSY_ALL_CORE
19-Feb-2025
13-Feb-2025
19-Feb-2025
19-Feb-2025
13-Feb-2025
19-Feb-2025
13-Feb-2025
19-Feb-2025

## 2025-02-25 NOTE — BH DISCHARGE NOTE NURSING/SOCIAL WORK/PSYCH REHAB - NSCDUDCCRISIS_PSY_A_CORE
[facility]  1 (891) [facility] ([phone])  Text "WELL" to 17205  Website: www.[employer]/.National Suicide Prevention Lifeline 4 (107) 685-3428/.  [facility]  1 (519) [facility] (579-3372)/.  [facility]  [address] [address] [phone]   Hours:  Monday through Friday from 9 AM to 3 PM/988 Suicide and Crisis Lifeline

## 2025-02-25 NOTE — BH INPATIENT PSYCHIATRY PROGRESS NOTE - NSBHFUPINTERVALHXFT_PSY_A_CORE
Patient seen for follow up of psychosis.    Chart reviewed and case discussed with Nursing Staff   Reports good sleep and appetite  Denies SE and tolerating Invega well at 9 mg and tolerated Invega EHALY 234 mg, wants to take 156mg today.  Counseled about Invega side effects including elevated prolactin leading to gynecomastia.  He expresses understanding.  Feels taking medication is best thing for his spiritual journey right now which he says is why he has sought it out.    Attends groups and is more social and more appropriate increasingly.  Planning discharge Wednesday.
Patient seen for follow up of psychosis.  I discussed patient's case with the interdisciplinary team.  There were no notable overnight events.  Patient slept well.  Patient was adherent to medication regimen.  Patient reports no side effects to Invega.  States he has not had any issues on the unit.  Remains guarded.  I mention Bible he has on the table but he is cagey in response to questions about [place] or spiritual beliefs or background.  States parents have visited.  Guarded about questions of psychosis.
Patient seen for follow up of psychosis.    Chart reviewed and case discussed with Nursing Staff   Reports good sleep last night and denies SE  Tolerating combination of Paliperidone PO and IM  Received second dose of 156 mg yesterday   Hopeful for discharge Wednesday.
Patient seen for follow up of psychosis.    Chart reviewed and case discussed with Nursing Staff   Reports good sleep and appetite  Denies SE and tolerating Invega well at 9 mg and tolerated Invega HEALY 234 mg   Attends groups and is more social and more appropriate increasingly.  Asks about timing of next injection and when he can be discharged. Says he will consent to us speaking with his parents.
Patient seen for follow up of psychosis.    Chart reviewed and case discussed with Nursing Staff   Reports good sleep and appetite  Denies SE and tolerating Invega well at 9 mg and tolerated Invega HEALY 234 mg   Attends groups and is more social and more appropriate increasingly.  He asks if I know [place] and whether I am active in [place].  He affirms he is assuming I am Sikh and explains he has been interested in reading the Bible in the original languages so he was trying to engage me on this topic.  
Patient seen for follow up of psychosis.    Chart reviewed and case discussed with Nursing Staff   Reports good sleep and appetite  Denies SE and tolerating Invega well at 9 mg and tolerated Invega HEALY 234 mg   Out of his room more but still only minimally social
Patient seen for follow up of psychosis.    Chart reviewed and case discussed with Nursing Staff   Reports good sleep and appetite  Denies SE and tolerating invega well
Patient seen for follow up of psychosis.    Chart reviewed and case discussed with Nursing Staff   Reports good sleep and appetite  Denies SE and tolerating Invega well and increasing to 9 mg  Will obtain Prolactin level and assess insurance coverage
Patient seen for follow up of psychosis.    Chart reviewed and case discussed with Nursing Staff   Reports good sleep and appetite  Denies SE 
Patient seen for follow up of psychosis.    Chart reviewed and case discussed with Nursing Staff   Reports good sleep and appetite  Denies SE and tolerating Invega well at 9 mg  Will obtain Prolactin level and will start Invega HEALY today 234 mg

## 2025-02-25 NOTE — BH INPATIENT PSYCHIATRY PROGRESS NOTE - MSE UNSTRUCTURED FT
Appearance: appears stated age, casual clothing; good hygiene and grooming  Behavior: cooperative. fairly well related  Eye contact: fair  Motor: No abnormal movements, no psychomotor slowing or activation.  Speech: fluent, normal rate, volume, prosody  Mood: "fine"   Affect: neutral  Thought Process: poverty of content of speech, overinclusive,  Thought Content vague paranoia  Perceptual disturbance: equivocal response about AH, does not appear to be responding to internal stimuli  Insight: fair  Judgment: emerging  Impulse control:  poor  Cognition grossly intact.   Language: Fluent.  Gait: Intact. 
Appearance: appears stated age, casual clothing; good hygiene and grooming  Behavior: cooperative. fairly well related  Eye contact: fair  Motor: No abnormal movements, no psychomotor slowing or activation.  Speech: fluent, normal rate, volume, prosody  Mood: "fine"   Affect: neutral  Thought Process: poverty of content of speech, overinclusive,  Thought Content vague paranoia  Perceptual disturbance: equivocal response about AH, does not appear to be responding to internal stimuli  Insight: fair  Judgment: emerging  Impulse control:  poor  Cognition grossly intact.   Language: Fluent.  Gait: Intact. 
Appearance: appears stated age, casual clothing; good hygiene and grooming  Behavior: cooperative. fairly well related  Eye contact: fair  Motor: No abnormal movements, no psychomotor slowing or activation.  Speech: fluent, normal rate, volume, prosody  Mood: "OK"   Affect: neutral  Thought Process: poverty of content of speech, overinclusive,  Thought Content vague paranoia  Perceptual disturbance: equivocal response about AH, does not appear to be responding to internal stimuli  Insight: fair  Judgment: emerging  Impulse control:  poor  Cognition grossly intact.   Language: Fluent.  Gait: Intact. 
Appearance: appears stated age, casual clothing; good hygiene and grooming  Behavior: cooperative. fairly well related  Eye contact: fair  Motor: No abnormal movements, no psychomotor slowing or activation.  Speech: fluent, normal rate, volume, prosody  Mood: "fine"   Affect: neutral  Thought Process: poverty of content of speech, overinclusive, stilted  Thought Content: possible vague paranoia  Perceptual disturbance: equivocal response about AH, does not appear to be responding to internal stimuli  Insight: fair  Judgment: emerging  Impulse control:  poor  Cognition grossly intact.   Language: Fluent.  Gait: Intact. 
Appearance: appears stated age, casual clothing; good hygiene and grooming  Behavior: cooperative. fairly well related  Eye contact: fair  Motor: No abnormal movements, no psychomotor slowing or activation.  Speech: fluent, normal rate, volume, prosody  Mood: "OK"   Affect: neutral  Thought Process: poverty of content of speech, overinclusive,  Thought Content vague paranoia  Perceptual disturbance: does not appear to be responding to internal stimuli, denies AH  Insight: fair  Judgment: emerging  Impulse control:  poor  Cognition grossly intact.   Language: Fluent.  Gait: Intact. 
Appearance: appears stated age, casual clothing; good hygiene and grooming  Behavior: cooperative. fairly well related  Eye contact: fair  Motor: No abnormal movements, no psychomotor slowing or activation.  Speech: fluent, normal rate, volume, prosody  Mood: "OK"   Affect: neutral  Thought Process: poverty of content of speech, overinclusive,  Thought Content vague paranoia  Perceptual disturbance: equivocal response about AH, does not appear to be responding to internal stimuli  Insight: fair  Judgment: emerging  Impulse control:  poor  Cognition grossly intact.   Language: Fluent.  Gait: Intact. 
Appearance: appears stated age, casual clothing; good hygiene and grooming  Behavior: cooperative. fairly well related  Eye contact: fair  Motor: No abnormal movements, no psychomotor slowing or activation.  Speech: fluent, normal rate, volume, prosody  Mood: "OK"   Affect: neutral  Thought Process: poverty of content of speech, overinclusive,  Thought Content vague paranoia  Perceptual disturbance: does not appear to be responding to internal stimuli, denies AH  Insight: fair  Judgment: emerging  Impulse control:  poor  Cognition grossly intact.   Language: Fluent.  Gait: Intact. 
Appearance: appears stated age, casual clothing; good hygiene and grooming  Behavior: cooperative. fairly well related  Eye contact: fair  Motor: No abnormal movements, no psychomotor slowing or activation.  Speech: fluent, normal rate, volume, prosody  Mood: "OK"   Affect: neutral  Thought Process: vague  Thought Content: some odd thought content.    Perceptual disturbance: does not appear to be responding to internal stimuli, denies AH  Insight: fair  Judgment: good  Impulse control:  poor  Cognition grossly intact.   Language: Fluent.  Gait: Intact. 
Appearance: appears stated age, casual clothing; good hygiene and grooming  Behavior: cooperative. fairly well related  Eye contact: fair  Motor: No abnormal movements, no psychomotor slowing or activation.  Speech: fluent, normal rate, volume, prosody  Mood: "OK"   Affect: neutral  Thought Process: vague  Thought Content: some odd thought content.    Perceptual disturbance: does not appear to be responding to internal stimuli, denies AH  Insight: fair  Judgment: good  Impulse control:  poor  Cognition grossly intact.   Language: Fluent.  Gait: Intact. 
Appearance: appears stated age, casual clothing; good hygiene and grooming  Behavior: cooperative. fairly well related  Eye contact: fair  Motor: No abnormal movements, no psychomotor slowing or activation.  Speech: fluent, normal rate, volume, prosody  Mood: "fine"   Affect: neutral  Thought Process: poverty of content of speech, overinclusive,  Thought Content vague paranoia  Perceptual disturbance: equivocal response about AH, does not appear to be responding to internal stimuli  Insight: fair  Judgment: emerging  Impulse control:  poor  Cognition grossly intact.   Language: Fluent.  Gait: Intact.

## 2025-02-25 NOTE — BH INPATIENT PSYCHIATRY PROGRESS NOTE - PRN MEDS
MEDICATIONS  (PRN):  OLANZapine 5 milliGRAM(s) Oral every 6 hours PRN agitation  OLANZapine Injectable 5 milliGRAM(s) IntraMuscular Once PRN severe agitation  

## 2025-02-25 NOTE — BH DISCHARGE NOTE NURSING/SOCIAL WORK/PSYCH REHAB - PATIENT PORTAL LINK FT
You can access the FollowMyHealth Patient Portal offered by [facility] by registering at the following website: [email or web address]. By joining [facility]’s FollowMyHealth portal, you will also be able to view your health information using other applications (apps) compatible with our system.

## 2025-02-26 PROCEDURE — 99239 HOSP IP/OBS DSCHRG MGMT >30: CPT

## 2025-02-26 RX ADMIN — INFLUENZA A VIRUS A/IDAHO/07/2018 (H1N1) ANTIGEN (MDCK CELL DERIVED, PROPIOLACTONE INACTIVATED, INFLUENZA A VIRUS A/INDIANA/08/2018 (H3N2) ANTIGEN (MDCK CELL DERIVED, PROPIOLACTONE INACTIVATED), INFLUENZA B VIRUS B/SINGAPORE/INFTT-16-0610/2016 ANTIGEN (MDCK CELL DERIVED, PROPIOLACTONE INACTIVATED), INFLUENZA B VIRUS B/IOWA/06/2017 ANTIGEN (MDCK CELL DERIVED, PROPIOLACTONE INACTIVATED) 0.5 MILLILITER(S): 15; 15; 15; 15 INJECTION, SUSPENSION INTRAMUSCULAR at 11:47

## 2025-02-26 NOTE — BH INPATIENT PSYCHIATRY DISCHARGE NOTE - REASON FOR ADMISSION
You presented to the ED brought in by yourself and sought to be admitted voluntarily to resume treatment with medications.

## 2025-02-26 NOTE — BH INPATIENT PSYCHIATRY DISCHARGE NOTE - NSBHFUPINTERVALHXFT_PSY_A_CORE
Patient seen for follow up of psychosis.    Chart reviewed and case discussed with Nursing Staff   Reports good sleep last night and denies SE  Tolerating combination of Paliperidone PO and IM.  We discuss elevated prolactin and he asks if he can switch medication.  I offer a switch in the hospital to at least assess tolerability and also for low dose Abilify to counteract elevated prolactin but he declines these in the interest of being discharged today.  Patient admits today during meeting with mother prior to discharge that he ordered mushrooms to use at home.  He then, somewhat flippantly, says he will avoid drugs if this is needed to remains stable.  He is dismissive of his mother's concerns overall.  He states he can remain on medication and compliant with appointments as well.  He denies SI, HI, AH.  Reports good mood.

## 2025-02-26 NOTE — BH INPATIENT PSYCHIATRY DISCHARGE NOTE - MSE UNSTRUCTURED FT
Appearance: appears stated age, casual clothing; good hygiene and grooming  Behavior: cooperative. fairly well related  Eye contact: fair  Motor: No abnormal movements, no psychomotor slowing or activation.  Speech: fluent, normal rate, volume, prosody  Mood: "OK"   Affect: neutral  Thought Process: remains vague  Thought Content: some odd thought content.    Perceptual disturbance: does not appear to be responding to internal stimuli, denies AH  Insight: fair  Judgment: good  Impulse control:  poor  Cognition grossly intact.   Language: Fluent.  Gait: Intact.

## 2025-02-26 NOTE — BH INPATIENT PSYCHIATRY DISCHARGE NOTE - HPI (INCLUDE ILLNESS QUALITY, SEVERITY, DURATION, TIMING, CONTEXT, MODIFYING FACTORS, ASSOCIATED SIGNS AND SYMPTOMS)
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 with hx of schizophrenia vs schizoaffective disorder, substance use (daily alcohol and cannabis use in past per records - pt denies), non adherent to meds and psych aftercare, with prior hx of psych admissions (last August 2024), was on AOT (discontinued Jan 2024), no legal issues, no access to firearms, no PMH, BIBEMS for patient wanting to restart Invega to cleanse self, now admitted on 1N.    Patient seen in interview room.  States he wants to resume Invega injection "for my health".  Asked if he has a diagnosis that would warrant treatment with Invega, he states he does not have schizophrenia but "schizotypal".  Asked about specific psychotic symptoms such as AH and paranoia, he becomes overinclusive and thought disordered: the summary of his words seem to approximate that everyone has unusual experiences, not just him and what happens to him is not truly out of the ordinary.  He could not be specific about his experiences of either paranoia or AH.  He was able to answer more focused questions, saying he lived with his parents in [place] and that he worked in a group home setting "with people like me" up until August when he was let go for having a beer while on the job at a group outing to [employer].  He states if someone he respects had told him not to do this, he would not have.  Denies depressed mood or SI.  Participated well in unit "tea time" this afternoon.    Per ED assessment: <<Patient is cooperative with interview, states that he came to the ED today in order to restart his Invega to cleanse himself. Patient says that [employer] is important to him and that he believes that Invega is the "only way to purity." When asked to elaborate, patient cannot provide additional information. Patient denies AVH, ideas of reference, mind reading, thought insertion or broadcasting. Patient denies paranoia. Denies any depressed or anxious mood, no SI/HI. Endorses "okay sleep." No issues with appetite. Denies substance use during interview. No access to firearms. Denies manic symptoms such as elevated or irritable mood, decreased need for sleep, increased goal directed behavior, etc. When asked what specifically changed that made him want to come today, pt unable to provide additional information. Pt requests hospitalization and for writer to leave, noted to be concrete on interview.  See ED Behavior Health Note for collateral.>>

## 2025-02-26 NOTE — BH INPATIENT PSYCHIATRY DISCHARGE NOTE - ATTENDING DISCHARGE PHYSICAL EXAM:
Patient is wanting to have a Dexa Scan done, can you put the order in for that. Last one was 3/21/2019   Attending Discharge Physical Examination:

## 2025-02-26 NOTE — BH INPATIENT PSYCHIATRY DISCHARGE NOTE - OTHER PAST PSYCHIATRIC HISTORY (INCLUDE DETAILS REGARDING ONSET, COURSE OF ILLNESS, INPATIENT/OUTPATIENT TREATMENT)
Since discharge in September 2024 in [facility], has not been adherent with outpatient treatment, not currently taking any meds. hx of nonadherence. previously in AOT. Pt has multiple psych admissions. Dx of schizoaffective disorder. No history of suicide attempts.

## 2025-02-26 NOTE — BH INPATIENT PSYCHIATRY DISCHARGE NOTE - NSDCMRMEDTOKEN_GEN_ALL_CORE_FT
Invega 9 mg oral tablet, extended release: 1 tab(s) orally once a day (at bedtime)  Invega Sustenna 117 mg/0.75 mL intramuscular suspension, extended release: 1 dose(s) intramuscular every 28 days

## 2025-02-26 NOTE — BH INPATIENT PSYCHIATRY DISCHARGE NOTE - HOSPITAL COURSE
Patient was admitted voluntarily to resume treatment with medications.  His stated reasons for doing so are vague and include that they are needed for his spiritual journey.  However, he is able to state more concrete risks and benefits as well.  He displayed odd beliefs on the unit such as magical thinking and characterized this as schizotypal but he has documented overt psychotic symptoms during past episodes and more paranoia is suspected.  He was guarded about some aspects of symptoms.  He was also not forthcoming about substance use, maintaining he has not used in some time.  On day of discharge, he finally admits when pressed that he had ordered mushrooms to consume but accepts advice to avoid mushrooms and other substances.  He tolerated PO Invega up to 9mg well and was directed to remain on this dose until discontinued by his outpatient psychiatrist.  He tolerated Invega Sustenna 234 mg IM 2/18, 156mg 2/24. We discuss prolactin elevation and possible gynecomastia if untreated.  He opts to defer treatment to outpatient psychiatrist.  We also discuss elevated cholesterol and counseled him to follow up with his primary care physician.      Suicide and risk assessment performed prior to discharge. The patient has a low acute risk and low chronic risk of self-harm and aggression towards others. Protective factors include denying SI, no SIB, denying HI, good social supports in their family, no current mood symptoms, no hopelessness, future-oriented in returning to home, no access to firearms.  Risk factors include presenting illness, past noncompliance with need for AOT order, past substance use. Immediate risk was minimized by inpatient admission to a safe environment with appropriate supervision and limited access to lethal means. Future risk was minimized before discharge by treatment of acute episode, maximizing outpatient support, providing relevant patient education, discussing emergency procedures, and ensuring close follow-up. The patient remains at a low risk of self-harm, and such risk cannot be further ameliorated by continued inpatient treatment and the patient is therefore appropriate for discharge.       There were no behavioral problems on the unit.  Patient did not become agitated and did not require emergent intramuscular medications or seclusion / restraints.  Patient did not self-harm on the unit.  Patient remained actively engaged in treatment.  Patient participated in individual, group, and milieu therapy.  Patient got along appropriately with staff and peers.   Patient did not have any medical problems during this hospitalization.  There were no medical consultations.    A full discussion of the factors that predict treatment success and relapse was held including safety planning.  A discussion of the risks and benefits of patient’s medication was held including a discussion of the metabolic risks and risk of EPS and TD was done.    The patient has improved significantly and no longer requires inpatient treatment and care. Patient denies all suicidal and aggressive ideation, intent and plan. Patient denies anxiety symptoms and panic attacks. Patient is not judged to be an acute danger to self or others at this time. Patient will be discharged today to home and outpatient follow up.

## 2025-02-26 NOTE — BH INPATIENT PSYCHIATRY DISCHARGE NOTE - NSBHASSESSSUMMFT_PSY_ALL_CORE
29 year old man with schizophrenia, not currently in treatment, presenting from home BIBEMS requesting a return to Invega Sustenna.  He has significant formal thought disorder and does not cite any particular symptoms which make him want to resume medication.  Has tolerated PO Invega 3mg so far.  Was paranoid and said he did not want us to contact collateral at this time.      Tolerating paliperidone oral and HEALY 234 mg IM 2/18, 156mg 2/24.  He is social on the unit and in good behavioral control.  His insight remains limited but his judgment has been good especially in regard to seeking treatment out spontaneously.  Discharge today with follow up in [facility].     PLAN  Invega 9 mg qHS and HEALY 234 mg IM 2/18, 156mg 2/24   [facility] follow up

## 2025-02-26 NOTE — BH INPATIENT PSYCHIATRY DISCHARGE NOTE - NSBHMETABOLIC_PSY_ALL_CORE_FT
BMI: BMI (kg/m2): 24.3 (02-13-25 @ 04:04)  HbA1c: A1C with Estimated Average Glucose Result: 5.4 % (02-25-25 @ 08:34)    Glucose:   BP: --Vital Signs Last 24 Hrs  T(C): --  T(F): --  HR: --  BP: --  BP(mean): --  RR: --  SpO2: --    Orthostatic VS  02-24-25 @ 19:51  Lying BP: --/-- HR: --  Sitting BP: 151/104 HR: 132  Standing BP: 152/102 HR: 136  Site: --  Mode: --    Lipid Panel: Date/Time: 02-25-25 @ 08:34  Cholesterol, Serum: 185  LDL Cholesterol Calculated: 132  HDL Cholesterol, Serum: 39  Total Cholesterol/HDL Ration Measurement: --  Triglycerides, Serum: 77

## 2025-02-26 NOTE — BH INPATIENT PSYCHIATRY DISCHARGE NOTE - NSDCPROCEDURESFT_PSY_ALL_CORE
02-25 Chol 185 LDL -- HDL 39[L] Trig 77, 02-12 Chol 149 LDL -- HDL 37[L] Trig 64    A1C with Estimated Average Glucose Result: 5.4: High Risk (prediabetic) 5.7 - 6.4 %   Prolactin, Serum: 84.1 ng/mL (02.25.25 @ 08:34)

## 2025-05-05 ENCOUNTER — EMERGENCY (EMERGENCY)
Facility: HOSPITAL | Age: 30
LOS: 1 days | End: 2025-05-05
Attending: EMERGENCY MEDICINE
Payer: MEDICAID

## 2025-05-05 VITALS
DIASTOLIC BLOOD PRESSURE: 87 MMHG | HEART RATE: 72 BPM | WEIGHT: 154.98 LBS | SYSTOLIC BLOOD PRESSURE: 128 MMHG | RESPIRATION RATE: 18 BRPM | HEIGHT: 68 IN | OXYGEN SATURATION: 98 % | TEMPERATURE: 98 F

## 2025-05-05 PROCEDURE — 99283 EMERGENCY DEPT VISIT LOW MDM: CPT

## 2025-05-05 PROCEDURE — 99282 EMERGENCY DEPT VISIT SF MDM: CPT

## 2025-05-05 NOTE — ED PROVIDER NOTE - CLINICAL SUMMARY MEDICAL DECISION MAKING FREE TEXT BOX
29-year-old male past medical history of schizophrenia presents to the ED with 1 episode of cold sweats.  States that 3 weeks ago, his dog was diagnosed with a bacterial infection and was put on antibiotics.  Reports that his dog finished the antibiotics 1 week ago.  States that today after playing pickle ball he had an episode of cold sweats that self resolved.  Denies any fevers, chills, cough, cold, chest pain, shortness of breath, sick contacts.    Vitals are within normal limits and nonactionable.  Patient is afebrile physical exam reveals well-appearing male, not in acute distress.  Heart and lung exam are unremarkable.  Consider a cold sweat secondary to cooling down after exercise. Low concern for infectious etiology at this time.  Will observe patient and have patient follow-up with his primary care. Will provide return precautions for signs and symptoms of infection.

## 2025-05-05 NOTE — ED PROVIDER NOTE - NSFOLLOWUPCLINICS_GEN_ALL_ED_FT
[facility] General Internal Medicine  General Internal Medicine  [address] [id number]  Phone: [phone]  Fax:   Follow Up Time: 1-3 Days

## 2025-05-05 NOTE — ED PROVIDER NOTE - ATTENDING CONTRIBUTION TO CARE
Attending MD Cook: I personally have seen and examined this patient.  Resident note reviewed and agree on plan of care except where noted.  See below for details.     seen in [facility]    29M with PMH/PSH including schizophrenia presents to the ED with concern for having contracted bacterial infection from dog.  Reports presents today because his dog was recently treated for fleas and had been biting on tail, reports vet gave dog antibiotic for bacteria on the dog's tail.  Reports that today he (the patient) was playing pickleball and became sweaty.  Reports was worried that his sweatiness was secondary to having contracted bacteria from dog.  Denies fevers.  Denies URI symptoms.  Denies chest pain, shortness of breath, abdominal pain, nausea, vomiting, diarrhea, urinary complaints including dysuria.  Denies rash or open wound.      Exam:   General: calm  HENT: head NCAT, airway patent  Eyes: anicteric, no conjunctival injection   Lungs: lungs CTAB with good inspiratory effort, no wheezing, no rhonchi, no rales  Cardiac: +S1S2, no obvious m/r/g  GI: abdomen soft, NT, ND  MSK: ranging neck and extremities freely  Neuro: moving all extremities spontaneously, nonfocal    A/P: 29M with concern for bacteria transmitted to him from dog.  Provided reassurance.  Stable for discharge. Follow up instructions given, importance of follow up emphasized, return to ED parameters reviewed and patient verbalized understanding.  All questions answered, all concerns addressed.

## 2025-05-05 NOTE — ED PROVIDER NOTE - PHYSICAL EXAMINATION
Greg Fagan DO (PGY1)   Physical Exam:    Gen: NAD, AOx3  Head: NCAT  HEENT: EOMI, PEERLA  Lung: CTAB, no respiratory distress, no wheezes/rhonchi/rales B/L  CV: RRR, no murmurs, rubs or gallops  Abd: soft, NT, ND, no guarding, no rigidity, no rebound tenderness, no CVA tenderness   MSK: no visible deformities, ROM normal in UE/LE, no back pain  Neuro: No focal sensory or motor deficits. Sensation intact to light touch all extremities.  Skin: Warm, well perfused, no rash, no leg swelling  Psych: normal affect, calm

## 2025-05-05 NOTE — ED PROVIDER NOTE - PATIENT PORTAL LINK FT
You can access the FollowMyHealth Patient Portal offered by [facility] by registering at the following website: [email or web address]. By joining [facility]’s FollowMyHealth portal, you will also be able to view your health information using other applications (apps) compatible with our system.

## 2025-05-05 NOTE — ED ADULT TRIAGE NOTE - CHIEF COMPLAINT QUOTE
dog was diagnosed with bacterial infection 2-3 weeks ago, c/o of cold sweats today, patient states concerned may be due to "dog's bacteria," as stopped abx today. Denies fever, chills.

## 2025-05-05 NOTE — ED PROVIDER NOTE - NSFOLLOWUPINSTRUCTIONS_ED_ALL_ED_FT
- You were seen in the emergency department today for cold sweats.    - Follow up with your doctor in 1 week.    - Return to the ED for any new, worsening, or concerning symptoms to you. Return to the ED if you have fevers, worsening and persistent cold sweats, cough/cold, or any other concerning symptoms to you.    - Rest and keep yourself hydrated with fluids. YOU WERE SEEN IN THE ED FOR: concern for having contracted your dog's bacterial infection.    WHILE YOU WERE HERE, YOUR vital signs were reassuring.  You did not have a fever, an elevated heart rate or respiratory rate. or a low oxygen saturation.  You report not having a thermometer at home.  You should purchase a thermometer.  If you feel like you are warm or hot or like you may have a fever, please take your temperature.  Also, when visiting the  for your dog, it is a good idea to ask what practices you should have in place to avoid infections.      If you have an open wound (a break in your skin), do not touch your dog's wound to your open wound.      PLEASE FOLLOW UP WITH YOUR PRIVATE PHYSICIAN WITHIN THE NEXT 3-5 DAYS. BRING COPIES OF YOUR RESULTS/DISCHARGE PAPERS.    RETURN TO THE EMERGENCY DEPARTMENT IF YOU EXPERIENCE ANY NEW/CONCERNING/WORSENING SYMPTOMS SUCH AS BUT NOT LIMITED TO: chest pain, shortness of breath, severe abdominal pain or back pain, pain with urinating, increasing pain in any area of your body, blood in your urine, stool, or vomit, a rash, or any other concern.

## 2025-05-10 ENCOUNTER — EMERGENCY (EMERGENCY)
Facility: HOSPITAL | Age: 30
LOS: 1 days | End: 2025-05-10
Attending: STUDENT IN AN ORGANIZED HEALTH CARE EDUCATION/TRAINING PROGRAM
Payer: MEDICAID

## 2025-05-10 VITALS
DIASTOLIC BLOOD PRESSURE: 91 MMHG | OXYGEN SATURATION: 99 % | SYSTOLIC BLOOD PRESSURE: 142 MMHG | HEIGHT: 68 IN | TEMPERATURE: 98 F | RESPIRATION RATE: 18 BRPM | HEART RATE: 67 BPM | WEIGHT: 154.98 LBS

## 2025-05-10 PROCEDURE — 99283 EMERGENCY DEPT VISIT LOW MDM: CPT | Mod: 25

## 2025-05-10 PROCEDURE — 71046 X-RAY EXAM CHEST 2 VIEWS: CPT

## 2025-05-10 PROCEDURE — 99284 EMERGENCY DEPT VISIT MOD MDM: CPT

## 2025-05-10 PROCEDURE — 71046 X-RAY EXAM CHEST 2 VIEWS: CPT | Mod: 26

## 2025-05-10 NOTE — ED ADULT TRIAGE NOTE - CHIEF COMPLAINT QUOTE
Foreign body. Pt states "I ate a pit a couple of years ago and I want to make sure it's out". Pt airway intact, talking in complete in full sentences. Pt denies pain.

## 2025-05-10 NOTE — ED PROVIDER NOTE - CLINICAL SUMMARY MEDICAL DECISION MAKING FREE TEXT BOX
dane attending- Patient well-appearing reassuring vital signs and exam otherwise unremarkable will evaluate for x-ray for retained foreign body otherwise discharge with follow-up with GI as needed return precautions discussed with patient agreed with plan no signs of acute psychiatric needs at this time  discussed with patient agreed with plan

## 2025-05-10 NOTE — ED PROVIDER NOTE - PATIENT PORTAL LINK FT
You can access the FollowMyHealth Patient Portal offered by [facility] by registering at the following website: [email or web address]. By joining [employer]’s FollowMyHealth portal, you will also be able to view your health information using other applications (apps) compatible with our system.

## 2025-05-10 NOTE — ED PROVIDER NOTE - ATTENDING CONTRIBUTION TO CARE
I, Venkat Fraga, performed a history and physical exam of the patient and discussed their management with the resident provider. I reviewed the resident provider's note and agree with the documented findings and plan of care. I was present and available for all procedures.     see my full note for details

## 2025-05-10 NOTE — ED ADULT NURSE NOTE - OBJECTIVE STATEMENT
29 year old male coming to ED complaining of a foreign body in stomach. A&Ox4. No significant PMH. Patient states that he ate a nectarine pit three years ago and came to ED tonight to see if its in his stomach. Patient states that he has not had it checked up on. Patient denies abdominal pain, n/v/d, flank pain. Patient states that he has no difficulty eating. Patient breathing spontaneously and unlabored.

## 2025-05-10 NOTE — ED PROVIDER NOTE - NSFOLLOWUPINSTRUCTIONS_ED_ALL_ED_FT
1. TAKE ALL MEDICATIONS AS DIRECTED.    2. FOR PAIN OR FEVER YOU CAN TAKE IBUPROFEN (MOTRIN, ADVIL) 600mg every 6 hours OR ACETAMINOPHEN (TYLENOL) 975mg every 6 hours AS NEEDED, AS DIRECTED ON PACKAGING.  3. FOLLOW UP WITH YOUR PRIMARY DOCTOR WITHIN 5 DAYS AS DIRECTED.  4. IF YOU HAD LABS OR IMAGING DONE, YOU WERE GIVEN COPIES OF ALL LABS AND/OR IMAGING RESULTS FROM YOUR ER VISIT--PLEASE TAKE THEM WITH YOU TO YOUR FOLLOW UP APPOINTMENTS.   To obtain a copy of your medical records or disc, please contact medical records during the hours of operation (Monday - Friday 8am - 7pm; Saturday 8am - 4pm) at Phone: [phone]   5. RETURN TO THE ER FOR ANY WORSENING SYMPTOMS OR CONCERNS.

## 2025-05-10 NOTE — ED PROVIDER NOTE - OBJECTIVE STATEMENT
dane attending- 28yo m no reported pmhx, hx of gastroparesis surgery when he was born, pw concern for retained fruit pit in stomach. Patient reports last year having swallowed a pit and this evening he woke up feeling concerned about it but has no symptoms at present denies nausea vomiting diarrhea chest pain shortness of breath eating patient tolerating otherwise today without any issues denies any diarrhea constipation dysuria denies abdominal trauma

## 2025-05-10 NOTE — ED PROVIDER NOTE - NSFOLLOWUPCLINICS_GEN_ALL_ED_FT
[facility]  Gastroenterology  [address] [place] [address] [address]  [place] [address]  Phone: [phone]  Fax:   Follow Up Time: Urgent

## 2025-06-17 NOTE — BH INPATIENT PSYCHIATRY PROGRESS NOTE - NSBHMSEAFFRANGE_PSY_A_CORE
Normal appearance, without tenderness upon palpation, no deformities, trachea midline, no masses , thyroid nontender.
Constricted
Labile
Constricted
Labile
Constricted
Labile
Constricted
Labile
Constricted
Constricted
Labile
Labile

## 2025-07-15 PROCEDURE — 71046 X-RAY EXAM CHEST 2 VIEWS: CPT

## 2025-07-15 PROCEDURE — 99283 EMERGENCY DEPT VISIT LOW MDM: CPT | Mod: 25
